# Patient Record
Sex: FEMALE | Race: WHITE | NOT HISPANIC OR LATINO | URBAN - METROPOLITAN AREA
[De-identification: names, ages, dates, MRNs, and addresses within clinical notes are randomized per-mention and may not be internally consistent; named-entity substitution may affect disease eponyms.]

---

## 2021-03-07 ENCOUNTER — INPATIENT (INPATIENT)
Facility: HOSPITAL | Age: 61
LOS: 2 days | Discharge: HOME | End: 2021-03-10
Attending: PSYCHIATRY & NEUROLOGY | Admitting: PSYCHIATRY & NEUROLOGY
Payer: MEDICARE

## 2021-03-07 VITALS
RESPIRATION RATE: 18 BRPM | DIASTOLIC BLOOD PRESSURE: 97 MMHG | HEART RATE: 67 BPM | SYSTOLIC BLOOD PRESSURE: 224 MMHG | OXYGEN SATURATION: 97 %

## 2021-03-07 LAB
ALBUMIN SERPL ELPH-MCNC: 4 G/DL — SIGNIFICANT CHANGE UP (ref 3.5–5.2)
ALP SERPL-CCNC: 86 U/L — SIGNIFICANT CHANGE UP (ref 30–115)
ALT FLD-CCNC: 13 U/L — SIGNIFICANT CHANGE UP (ref 0–41)
ANION GAP SERPL CALC-SCNC: 12 MMOL/L — SIGNIFICANT CHANGE UP (ref 7–14)
APTT BLD: 36.9 SEC — SIGNIFICANT CHANGE UP (ref 27–39.2)
AST SERPL-CCNC: 19 U/L — SIGNIFICANT CHANGE UP (ref 0–41)
BASE EXCESS BLDV CALC-SCNC: 3.6 MMOL/L — HIGH (ref -2–2)
BASOPHILS # BLD AUTO: 0.06 K/UL — SIGNIFICANT CHANGE UP (ref 0–0.2)
BASOPHILS NFR BLD AUTO: 0.7 % — SIGNIFICANT CHANGE UP (ref 0–1)
BILIRUB SERPL-MCNC: <0.2 MG/DL — SIGNIFICANT CHANGE UP (ref 0.2–1.2)
BUN SERPL-MCNC: 12 MG/DL — SIGNIFICANT CHANGE UP (ref 10–20)
CA-I SERPL-SCNC: 1.19 MMOL/L — SIGNIFICANT CHANGE UP (ref 1.12–1.3)
CALCIUM SERPL-MCNC: 9.5 MG/DL — SIGNIFICANT CHANGE UP (ref 8.5–10.1)
CHLORIDE SERPL-SCNC: 101 MMOL/L — SIGNIFICANT CHANGE UP (ref 98–110)
CHOLEST SERPL-MCNC: 317 MG/DL — HIGH
CO2 SERPL-SCNC: 24 MMOL/L — SIGNIFICANT CHANGE UP (ref 17–32)
CREAT SERPL-MCNC: 0.7 MG/DL — SIGNIFICANT CHANGE UP (ref 0.7–1.5)
EOSINOPHIL # BLD AUTO: 0.11 K/UL — SIGNIFICANT CHANGE UP (ref 0–0.7)
EOSINOPHIL NFR BLD AUTO: 1.3 % — SIGNIFICANT CHANGE UP (ref 0–8)
ETHANOL SERPL-MCNC: <10 MG/DL — SIGNIFICANT CHANGE UP
GAS PNL BLDV: 140 MMOL/L — SIGNIFICANT CHANGE UP (ref 136–145)
GAS PNL BLDV: SIGNIFICANT CHANGE UP
GLUCOSE SERPL-MCNC: 165 MG/DL — HIGH (ref 70–99)
HCO3 BLDV-SCNC: 30 MMOL/L — HIGH (ref 22–29)
HCT VFR BLD CALC: 42 % — SIGNIFICANT CHANGE UP (ref 37–47)
HCT VFR BLDA CALC: 50.1 % — HIGH (ref 34–44)
HDLC SERPL-MCNC: 29 MG/DL — LOW
HGB BLD CALC-MCNC: 16.3 G/DL — SIGNIFICANT CHANGE UP (ref 14–18)
HGB BLD-MCNC: 14.8 G/DL — SIGNIFICANT CHANGE UP (ref 12–16)
IMM GRANULOCYTES NFR BLD AUTO: 0.5 % — HIGH (ref 0.1–0.3)
INR BLD: 0.97 RATIO — SIGNIFICANT CHANGE UP (ref 0.65–1.3)
LACTATE BLDV-MCNC: 2 MMOL/L — HIGH (ref 0.5–1.6)
LIPID PNL WITH DIRECT LDL SERPL: 175 MG/DL — HIGH
LYMPHOCYTES # BLD AUTO: 3.87 K/UL — HIGH (ref 1.2–3.4)
LYMPHOCYTES # BLD AUTO: 46.3 % — SIGNIFICANT CHANGE UP (ref 20.5–51.1)
MCHC RBC-ENTMCNC: 31.5 PG — HIGH (ref 27–31)
MCHC RBC-ENTMCNC: 35.2 G/DL — SIGNIFICANT CHANGE UP (ref 32–37)
MCV RBC AUTO: 89.4 FL — SIGNIFICANT CHANGE UP (ref 81–99)
MONOCYTES # BLD AUTO: 0.58 K/UL — SIGNIFICANT CHANGE UP (ref 0.1–0.6)
MONOCYTES NFR BLD AUTO: 6.9 % — SIGNIFICANT CHANGE UP (ref 1.7–9.3)
NEUTROPHILS # BLD AUTO: 3.7 K/UL — SIGNIFICANT CHANGE UP (ref 1.4–6.5)
NEUTROPHILS NFR BLD AUTO: 44.3 % — SIGNIFICANT CHANGE UP (ref 42.2–75.2)
NON HDL CHOLESTEROL: 288 MG/DL — HIGH
NRBC # BLD: 0 /100 WBCS — SIGNIFICANT CHANGE UP (ref 0–0)
PCO2 BLDV: 50 MMHG — SIGNIFICANT CHANGE UP (ref 41–51)
PH BLDV: 7.38 — SIGNIFICANT CHANGE UP (ref 7.26–7.43)
PLATELET # BLD AUTO: 280 K/UL — SIGNIFICANT CHANGE UP (ref 130–400)
PO2 BLDV: 35 MMHG — SIGNIFICANT CHANGE UP (ref 20–40)
POTASSIUM BLDV-SCNC: 5.2 MMOL/L — SIGNIFICANT CHANGE UP (ref 3.3–5.6)
POTASSIUM SERPL-MCNC: 5.7 MMOL/L — HIGH (ref 3.5–5)
POTASSIUM SERPL-SCNC: 5.7 MMOL/L — HIGH (ref 3.5–5)
PROT SERPL-MCNC: 7.4 G/DL — SIGNIFICANT CHANGE UP (ref 6–8)
PROTHROM AB SERPL-ACNC: 11.2 SEC — SIGNIFICANT CHANGE UP (ref 9.95–12.87)
RAPID RVP RESULT: SIGNIFICANT CHANGE UP
RBC # BLD: 4.7 M/UL — SIGNIFICANT CHANGE UP (ref 4.2–5.4)
RBC # FLD: 12.7 % — SIGNIFICANT CHANGE UP (ref 11.5–14.5)
SAO2 % BLDV: 70 % — SIGNIFICANT CHANGE UP
SARS-COV-2 RNA SPEC QL NAA+PROBE: SIGNIFICANT CHANGE UP
SODIUM SERPL-SCNC: 137 MMOL/L — SIGNIFICANT CHANGE UP (ref 135–146)
TRIGL SERPL-MCNC: 664 MG/DL — HIGH
TROPONIN T SERPL-MCNC: <0.01 NG/ML — SIGNIFICANT CHANGE UP
WBC # BLD: 8.36 K/UL — SIGNIFICANT CHANGE UP (ref 4.8–10.8)
WBC # FLD AUTO: 8.36 K/UL — SIGNIFICANT CHANGE UP (ref 4.8–10.8)

## 2021-03-07 PROCEDURE — 70498 CT ANGIOGRAPHY NECK: CPT | Mod: 26

## 2021-03-07 PROCEDURE — 93010 ELECTROCARDIOGRAM REPORT: CPT

## 2021-03-07 PROCEDURE — 70496 CT ANGIOGRAPHY HEAD: CPT | Mod: 26

## 2021-03-07 PROCEDURE — 0042T: CPT

## 2021-03-07 PROCEDURE — 70450 CT HEAD/BRAIN W/O DYE: CPT | Mod: 26,59

## 2021-03-07 PROCEDURE — 71045 X-RAY EXAM CHEST 1 VIEW: CPT | Mod: 26

## 2021-03-07 PROCEDURE — G0425: CPT | Mod: 95

## 2021-03-07 PROCEDURE — 99291 CRITICAL CARE FIRST HOUR: CPT | Mod: CS,GC

## 2021-03-07 RX ORDER — DEXTROSE 50 % IN WATER 50 %
25 SYRINGE (ML) INTRAVENOUS ONCE
Refills: 0 | Status: DISCONTINUED | OUTPATIENT
Start: 2021-03-07 | End: 2021-03-10

## 2021-03-07 RX ORDER — INSULIN LISPRO 100/ML
VIAL (ML) SUBCUTANEOUS
Refills: 0 | Status: DISCONTINUED | OUTPATIENT
Start: 2021-03-07 | End: 2021-03-10

## 2021-03-07 RX ORDER — ALTEPLASE 100 MG
60.7 KIT INTRAVENOUS ONCE
Refills: 0 | Status: COMPLETED | OUTPATIENT
Start: 2021-03-07 | End: 2021-03-07

## 2021-03-07 RX ORDER — DEXTROSE 50 % IN WATER 50 %
15 SYRINGE (ML) INTRAVENOUS ONCE
Refills: 0 | Status: DISCONTINUED | OUTPATIENT
Start: 2021-03-07 | End: 2021-03-10

## 2021-03-07 RX ORDER — ATORVASTATIN CALCIUM 80 MG/1
80 TABLET, FILM COATED ORAL AT BEDTIME
Refills: 0 | Status: DISCONTINUED | OUTPATIENT
Start: 2021-03-07 | End: 2021-03-10

## 2021-03-07 RX ORDER — GLUCAGON INJECTION, SOLUTION 0.5 MG/.1ML
1 INJECTION, SOLUTION SUBCUTANEOUS ONCE
Refills: 0 | Status: DISCONTINUED | OUTPATIENT
Start: 2021-03-07 | End: 2021-03-10

## 2021-03-07 RX ORDER — SODIUM CHLORIDE 9 MG/ML
1000 INJECTION, SOLUTION INTRAVENOUS
Refills: 0 | Status: DISCONTINUED | OUTPATIENT
Start: 2021-03-07 | End: 2021-03-10

## 2021-03-07 RX ORDER — ALTEPLASE 100 MG
6.7 KIT INTRAVENOUS ONCE
Refills: 0 | Status: COMPLETED | OUTPATIENT
Start: 2021-03-07 | End: 2021-03-07

## 2021-03-07 RX ORDER — NICARDIPINE HYDROCHLORIDE 30 MG/1
5 CAPSULE, EXTENDED RELEASE ORAL
Qty: 40 | Refills: 0 | Status: DISCONTINUED | OUTPATIENT
Start: 2021-03-07 | End: 2021-03-10

## 2021-03-07 RX ORDER — DEXTROSE 50 % IN WATER 50 %
12.5 SYRINGE (ML) INTRAVENOUS ONCE
Refills: 0 | Status: DISCONTINUED | OUTPATIENT
Start: 2021-03-07 | End: 2021-03-10

## 2021-03-07 RX ORDER — LABETALOL HCL 100 MG
10 TABLET ORAL
Refills: 0 | Status: DISCONTINUED | OUTPATIENT
Start: 2021-03-07 | End: 2021-03-10

## 2021-03-07 RX ADMIN — ALTEPLASE 402 MILLIGRAM(S): KIT at 19:44

## 2021-03-07 RX ADMIN — NICARDIPINE HYDROCHLORIDE 25 MG/HR: 30 CAPSULE, EXTENDED RELEASE ORAL at 21:01

## 2021-03-07 RX ADMIN — ALTEPLASE 60.7 MILLIGRAM(S): KIT at 19:45

## 2021-03-07 RX ADMIN — Medication 10 MILLIGRAM(S): at 19:41

## 2021-03-07 NOTE — H&P ADULT - ATTENDING COMMENTS
patient seen and examined, agree with above, ?? stroke sp tpa, active smoker, HTN, Neuro check q 1h, Neuro f/up, MRI, 24 h in MICU

## 2021-03-07 NOTE — H&P ADULT - ASSESSMENT
60 year old female patient presented for syncope and left sided weakness.     Known to be manic depressive, type II diabetes not on any treatment, active smoker 46PY, right handed.    # Possibly Lacunar stroke given the history of uncontrolled Diabetes and BP  - TPA given     > TPA bolus dose: 6.7 ml (7.44pm)    > TPA infusion dose: 60.4 ml     > initiated at 7:45pm  - Neuro checks q15 min x2 hours then q 30 min x6 hrs and then q 1hour x 24 hrs  - No anticoagulation or antiplatelets for now  - Mechanical DVT prophylaxis  - Keep sbp <185/105 and >110/60  - On nicardipine drip  - Repeat HCT in 24 hours or sooner if clinical deterioration > Order not placed, observing at the moment  - Pending Mri brain n/c  - Pending Echocardiogram  - Passed dysphagia screen   - On Lipitor 80mg q 24    # Manic depressive  - Receives Prolixin injection once q2 weeks  - Last dose 3/5/21 according to patient     # DM II  - On insulin scale  - Pending HbA1c    # Diet > DASH/TLC consistent carbs, passed dysphagia screen  # DVT > SCDs 60 year old female patient presented for syncope and left sided weakness.     Known to be manic depressive, type II diabetes not on any treatment, active smoker 46PY, right handed.    # Possibly Lacunar stroke given the history of uncontrolled Diabetes and BP  - TPA given     > TPA bolus dose: 6.7 ml (7.44pm)    > TPA infusion dose: 60.4 ml     > initiated at 7:45pm  - Neuro checks q15 min x2 hours then q 30 min x6 hrs and then q 1hour x 24 hrs  - No anticoagulation or antiplatelets for now  - Mechanical DVT prophylaxis  - Keep sbp <185/105 and >110/60  - On nicardipine drip  - Repeat HCT in 24 hours or sooner if clinical deterioration > Order not placed, observing at the moment  - Pending Mri brain n/c  - Pending Echocardiogram  - Passed dysphagia screen     # Untreated dyslipidemia  - Lipid Profile (03.07.21 @ 19:40)    Cholesterol, Serum: 317 mg/dL    Triglycerides, Serum: 664 mg/dL    HDL Cholesterol, Serum: 29 mg/dL    LDL cholesterol 175 mg/dl  - Started Lipitor 80mg q 24    # Untreated DM II  - On insulin scale  - Pending HbA1c    # Manic depressive  - Receives Prolixin injection once q2 weeks  - Last dose 3/5/21 according to patient     # Diet > DASH/TLC consistent carbs, passed dysphagia screen  # DVT > SCDs 60 year old female patient presented for syncope and left sided weakness.     Known to be manic depressive, type II diabetes not on any treatment, active smoker 46PY, right handed.    # stroke  - TPA given     > TPA bolus dose: 6.7 ml (7.44pm)    > TPA infusion dose: 60.4 ml     > initiated at 7:45pm  - Neuro checks q15 min x2 hours then q 30 min x6 hrs and then q 1hour x 24 hrs  - No anticoagulation or antiplatelets for now  - Mechanical DVT prophylaxis  - Keep sbp <185/105 and >110/60  - On nicardipine drip  - Repeat HCT in 24 hours or sooner if clinical deterioration > Order not placed, observing at the moment  - Pending Mri brain n/c  - Pending Echocardiogram  - Passed dysphagia screen     # Untreated dyslipidemia  - Lipid Profile (03.07.21 @ 19:40)    Cholesterol, Serum: 317 mg/dL    Triglycerides, Serum: 664 mg/dL    HDL Cholesterol, Serum: 29 mg/dL    LDL cholesterol 175 mg/dl  - Started Lipitor 80mg q 24    # Untreated DM II  - On insulin scale  - Pending HbA1c    # Manic depressive  - Receives Prolixin injection once q2 weeks  - Last dose 3/5/21 according to patient     # Diet > DASH/TLC consistent carbs, passed dysphagia screen  # DVT > SCDs

## 2021-03-07 NOTE — H&P ADULT - HISTORY OF PRESENT ILLNESS
60 year old female patient presented for syncope and left sided weakness.     Known to be manic depressive, type II diabetes not on any treatment, active smoker 46PY, right handed.    Current history goes back to 6:00pm of the day of presentation when the patient nazia from a seated position and had an unwitnessed syncope.   Paitnet afterwards noted difficulty walking and falling towards her left side. Noted weakness in her left lower extremity, called EMS and presented for assessment.     Last well known 1700.  BP on arrival 224/97 . CTH was negative for acute intracranial findings. Patient was evaluated by neuro attending on call Dr Taylor via telestroke. Patient was a tpa candidate. Risks and benefits of tpa was explained to patient in detail including the 6% chance of bleeding and patient agreed to IV TPA. CTA/P was negative for LVO and perfusion deficits.

## 2021-03-07 NOTE — ED PROVIDER NOTE - CLINICAL SUMMARY MEDICAL DECISION MAKING FREE TEXT BOX
Pt presented to ED with stroke sx's. Labs, imaging obtained and reviewed. PT with stroke symptoms within time window for tpa. R/B/A discussed. PT given tpa, approved for ICU for further management.

## 2021-03-07 NOTE — ED ADULT NURSE NOTE - OBJECTIVE STATEMENT
Patient is a 60 year old female BIBA as a prenotification for a CVA. As per patient she was at baseline at 5PM, at 6oclock she tried getting up to make dinner and fell down due to left sided weakness. No other complaints at this time.

## 2021-03-07 NOTE — ED PROVIDER NOTE - OBJECTIVE STATEMENT
60Y F with PMH of DM, + smoker presents with CC of left arm weakness, left sided numbness, +facial droop for 1-2 hour prior to arrival?, patient last well known between 5-6PM. Patient with EMS notifications, Stroke code, called PTA. Denies fevers, chest pain, SOB, abdominal pain, N/V. NIHSS of 6 on arrival.

## 2021-03-07 NOTE — ED ADULT NURSE NOTE - NSIMPLEMENTINTERV_GEN_ALL_ED
Implemented All Fall with Harm Risk Interventions:  [place] to call system. Call bell, personal items and telephone within reach. Instruct patient to call for assistance. Room bathroom lighting operational. Non-slip footwear when patient is off stretcher. Physically safe environment: no spills, clutter or unnecessary equipment. Stretcher in lowest position, wheels locked, appropriate side rails in place. Provide visual cue, wrist band, yellow gown, etc. Monitor gait and stability. Monitor for mental status changes and reorient to person, place, and time. Review medications for side effects contributing to fall risk. Reinforce activity limits and safety measures with patient and family. Provide visual clues: red socks.

## 2021-03-07 NOTE — CONSULT NOTE ADULT - SUBJECTIVE AND OBJECTIVE BOX
Chief Complaint: Left sided weakness and numbness        HPI:  Pt is a 61 y/o Right handed female with hx of DM, + smoker, Ayan score of 0 at baseline p.w left sided weakness and sensory loss that started an hour prior to arrival as per patient. Patient states that she was baseline and walked normally at 5pm and then at 6pm she tried to get out of the chair and then fell due to weakness in her left left leg. Last well known 1700. Patient denies speech visual deficits, n/v dizziness headache, LOC or head trauma. BP on arrival 224/97           Relevant PMH:  [] Prior ischemic stroke/TIA  [] Afib  []CAD  []HTN  []DLD  [x]DM []PVD []Obesity [] Sedentary lifestyle []CHF  []ASHWIN  []Cancer Hx         Social History: [x] Smoking []  Drug Use: []   Alcohol Use:   [] Other:        Possible Location of Stroke:  Unknown at this time, will have a better understanding post stroke workup.      Possible Cause of Stroke:  Unknown at this time, will have a better understanding post stroke workup.      Relevant Cerebral Imaging:  < from: CT Brain Stroke Protocol (03.07.21 @ 19:31) >  FINDINGS:    The ventricles, basal cisterns and sulcal pattern are within normal limits for patient's stated age.    There is no acute mass effect, midline shift or intracranial hemorrhage.    There is no large territorial infarct.    Bilateral subcortical and periventricular white matter hypodensities, consistent with chronic microvascular ischemic changes.    No evidence of depressed skull fracture.    The visualized paranasal sinuses are well aerated.    The bilateral mastoid complexes, globes and orbits are grossly within normal limits.    IMPRESSION:  1.  No CT evidence of acute intracranial pathology.  2.  Moderate chronic microvascular ischemic changes.    Dr. Isa Rodriguez discussed preliminary findings with CRESENCIO DYE NP on 3/7/2021 7:44 PM with readback.  I have reviewed the above preliminary report following comment-there is a poorly defined area of low density involving the left lateral basal ganglia/putamen which may represent ischemic infarct. Correlate with clinical findings and if clinically indicated an MRI study is suggested for further evaluation of this area. A call back request was submitted            ISA RODRIGUEZ M.D., RESIDENT RADIOLOGIST  This document has been electronically signed.  NATE BERRIOS MD; Attending Radiologist  This document has been electronically signed. Mar  7 2021  8:13PM    < end of copied text >        Relevant Cervicocerebral Imaging:  CT Angio Neck w/ IV Cont:   ******PRELIMINARY REPORT******    ******PRELIMINARY REPORT******          EXAM:  CT ANGIO NECK (W)AW IC        EXAM:  CT ANGIO BRAIN (W)AW IC        EXAM:  CT PERFUSION W MAPS IC            PROCEDURE DATE:  03/07/2021      ******PRELIMINARY REPORT******    ******PRELIMINARY REPORT******          INTERPRETATION:  Clinical History / Reason for exam: Stroke code.    Technique: CT perfusion of the brain was performed along with CTA of the head and neck after the intravenous administration of 100 cc Isovue-370 contrast. Sagittal, coronal and axial reformatted images were obtained as well as 3-D volume rendered images.    No comparison studies are available.    Findings:    CTA neck:  The aortic arch, origin of the great vessels and subclavian arteries are unremarkable.  There is mild stenosis at the origin of the right internal carotid artery with poststenotic dilatation. There is mild stenosis of the proximal internal carotid artery.  Otherwise the bilateral common carotid arteries and internal carotid arteries are unremarkable without significant stenosis seen.  The vertebral arteries are codominant.  No significant vertebral artery stenosis is noted.    CTA head:  The distal internal carotid arteries, middle cerebral arteries and anterior cerebral arteries are unremarkable without significant stenosis.  The basilar artery, superior cerebellar arteries and posterior cerebral arteries are unremarkable without significant stenosis.    No aneurysm or vascular malformation is identified.    Degenerative changes of the spine.    CT perfusion:  Using Tmax >6 seconds there is decreased perfusion of the bilateral temporal lobes, likely artifactual in nature given location and distribution. Otherwise no significant evidence of fixedor mismatched focal perfusion deficit or surrounding ischemic penumbra to suggest acute cerebral ischemia or infarct.      IMPRESSION:  1.  Normal CT angiogram of the head and neck.  2.  Using Tmax >6 seconds there is decreased perfusion of the bilateral temporal lobes, likely artifactual in nature given location and distribution.  3.  Otherwise no evidence of acute ischemia.    Dr. Isa Rodriguez discussed preliminary findings with CRESENCIO DYE NP on 3/7/2021 8:25 PM with readback.      Relevant blood tests:  Lipid Profile (03.07.21 @ 19:40)   Cholesterol, Serum: 317 mg/dL   Triglycerides, Serum: 664 mg/dL   HDL Cholesterol, Serum: 29 mg/dL   Non HDL Cholesterol: 288: Patient’s Atherosclerotic Cardiovascular Disease (ASCVD) Risk   LDL Cholesterol Calculated: 175 mg/dL       Relevant cardiac rhythm monitoring: Pending    Relevant Cardiac Structure:(TTE/KEM +/-):[]No intracardiac thrombus/[] no vegetation/[]no akynesia/EF: Pending      Home Medications:    MEDICATIONS  (STANDING):  alteplase    Bolus 6.7 milliGRAM(s) IV Bolus Once  alteplase    IVPB 60.7 milliGRAM(s) IV Intermittent Once  labetalol Injectable 10 milliGRAM(s) IV Push every 10 minutes  niCARdipine Infusion 5 mG/Hr (25 mL/Hr) IV Continuous <Continuous>      PT/OT/Speech/Rehab/S&Swr: pending    Exam:  Patient a/O X 4 , speech is fluent.  CN: Intact except for left facial droop  Power: RUE 5/5   RLE 5/5             LUE 4-/5  LLE 4-/5 (Left arm and leg drift)  FTN: Left dysmetria  HKS: No limb ataxia  Sensory: Decreased sensation to pp on the left side  No neglect        Vital Signs Last 24 Hrs  T(C): --  T(F): --  HR: 62 (07 Mar 2021 19:46) (58 - 79)  BP: 189/90 (07 Mar 2021 19:46) (174/102 - 224/97)  BP(mean): --  RR: 18 (07 Mar 2021 19:33) (18 - 18)  SpO2: 96% (07 Mar 2021 19:33) (96% - 97%)        NIHSS  LOC:       1a:  0   1b(Questions):   0        1c(Instructions):  0           Best Gaze:0  Visual: 0  Motor:                 RUE: 0    RLE:  0   LUE: 1    LLE:  1   FACE:   1  Limb Ataxia: 1  Sensory:    1   Language:   0    Dysarthria:  0        Extinction and Inattention: 0    NIHSS on admission: 5                   m-RS: (baseline mrankin score 0)  0 No symptoms at all  1 No significant disability despite symptoms; able to carry out all usual duties and activities without assistance  2 Slight disability; unable to carry out all previous activities, but able to look after own affairs  3 Moderate disability; requiring some help, but able to walk without assistance  4 Moderately severe disability; unable to walk without assistance and unable to attend to own bodily needs without assistance  5 Severe disability; bedridden, incontinent and requiring constant nursing care and attention  6 Dead

## 2021-03-07 NOTE — CONSULT NOTE ADULT - ATTENDING COMMENTS
History, events, data and scans reviewed, patient examined at bedside on 03/08/2021. I agree and approved plan of care as outlined above and a progress note dated 03/08/2021.

## 2021-03-07 NOTE — CONSULT NOTE ADULT - CONSULT REASON
******Stroke code activated at : 7.09 pm (prenotification)              Patient arrived to ED : 7.17pm              Last known well time : 5pm

## 2021-03-07 NOTE — STROKE CODE NOTE - ASSESSMENT/PLAN
Left sided weakness and numbness  NIHSS 5  Mrankin Score OF 0 at baseline Left sided weakness and numbness  NIHSS 5  Mrankin Score OF 0 at baseline.    Patient examined over telestroke.    She recalls walking normally at 5 p.m.  When she got up to walk at 6 p.m. she fell and called friend.    Risk/benefits of TPA explained to patient and gave patient opportunity to ask questions.    TPA administered at 7:44 p.m.    Disposition ICU.

## 2021-03-07 NOTE — H&P ADULT - NSHPPHYSICALEXAM_GEN_ALL_CORE
General: A/ox 3, No acute Distress  Neck: Supple, NO JVD  Cardiac: S1 S2 heard regular, No audible murmurs  Pulmonary: Good bilateral breath sounds, Breathing unlabored, No Rhonchi/Rales/Wheezing  Abdomen: Soft, Non -tender, +BS   Extremities: No Rashes, No edema  Neuro: A/o x 3, No focal deficits, tremor noted in right hand. Chronic as per patient. No weakness at time of H&P. No slurred speech either.   Psch: normal mood , normal affect    T(C): 36.6 (03-07-21 @ 21:45), Max: 36.9 (03-07-21 @ 20:00)  HR: 100 (03-07-21 @ 21:45) (58 - 100)  BP: 178/80 (03-07-21 @ 21:45) (131/70 - 224/97)  RR: 18 (03-07-21 @ 21:45) (18 - 18)  SpO2: 97% (03-07-21 @ 21:45) (96% - 99%)

## 2021-03-07 NOTE — ED PROVIDER NOTE - NS ED ROS FT
Review of Systems:  CONSTITUTIONAL - No fever  SKIN - No rash  HEMATOLOGIC - No abnormal bleeding or bruising  RESPIRATORY - No shortness of breath, No cough  CARDIAC -No chest pain, No palpitations  GI - No abdominal pain, No nausea, No vomiting, No diarrhea  MUSCULOSKELETAL - No joint paint, No swelling, No back pain  All other systems negative, unless specified in HPI

## 2021-03-07 NOTE — CONSULT NOTE ADULT - ASSESSMENT
Impression:  59 y/o Right handed female with hx of DM, + smoker, Ayan score of 0 at baseline p.w left sided weakness and sensory loss that started an hour prior to arrival as per patient. Patient states that she was baseline and walked normally at 5pm and then at 6pm she tried to get out of the chair and then fell due to weakness in her left left leg. Last well known 1700.  BP on arrival 224/97 . CTH was negative for acute intracranial findings. Patient was evaluated by neuro attending on call Dr Taylor via telestroke. Patient was a tpa candidate. Risks and benefits of tpa was explained to patient in detail including the 6% chance of bleeding and patient agreed to IV TPA. CTA/P was negative for LVO and perfusion deficits.    #Likely a Lacunar stroke given the history of uncontrolled Diabetes and BP    Patient weight : 74.5kg  TPA bolus dose: 6.7 ml (7.44pm)  TPA infusion dose: 60.4 ml (initiated at 7.45pm)        Suggestion:  Administer TPA 10% bolus followed by 90%infusion over 1 hour (Administered in ed)  Neuro checks q15 min x2 hours then q 30 min x6 hrs and then q 1hour x 24 hrs  No anticoagulation or antiplatelets for now  Mechanical DVT prophylaxis  Keep sbp <185/105 and >110/60  Repeat HCT in 24 hours or sooner if clinical deterioration  Mri brain n/c  Echocardiogram  Dysphagia screen(no po meds until patient clears dysphagia screen)  Start Lipitor 80mg q 24  Pt/Rehab eval     Disposition: ICU /CCU   Impression:  61 y/o Right handed female with hx of DM, + smoker, Ayan score of 0 at baseline p.w left sided weakness and sensory loss that started an hour prior to arrival as per patient. Patient states that she was baseline and walked normally at 5pm and then at 6pm she tried to get out of the chair and then fell due to weakness in her left left leg. Last well known 1700.  BP on arrival 224/97 . CTH was negative for acute intracranial findings. Patient was evaluated by neuro attending on call Dr Taylor via telestroke. Patient was a tpa candidate. Risks and benefits of tpa was explained to patient in detail including the 6% chance of bleeding and patient agreed to IV TPA. CTA/P was negative for LVO and perfusion deficits.    #Likely a Lacunar stroke given the history of uncontrolled Diabetes and BP    Patient weight : 74.5kg  TPA bolus dose: 6.7 ml (7.44pm)  TPA infusion dose: 60.4 ml (initiated at 7.45pm)        Suggestion:  Administer TPA 10% bolus followed by 90%infusion over 1 hour (Administered in ed)  Neuro checks q15 min x2 hours then q 30 min x6 hrs and then q 1hour x 24 hrs  No anticoagulation or antiplatelets for now  Mechanical DVT prophylaxis  Keep sbp <185/105 and >110/60  Repeat HCT in 24 hours or sooner if clinical deterioration  Mri brain n/c  Echocardiogram  Dysphagia screen(no po meds until patient clears dysphagia screen)  Start Lipitor 80mg q 24  Pt/Rehab eval   Smoking cessation education and nicotine patch.    Disposition: ICU /CCU

## 2021-03-07 NOTE — ED PROVIDER NOTE - PHYSICAL EXAMINATION
CONSTITUTIONAL: well developed, well nourished, in no acute distress, speaking in full sentences, nontoxic appearing  SKIN: warm, dry, no rash  HEAD: normocephalic, atraumatic  EYES: PERRL at 3 mm, EOMI, no conjunctival erythema, no nystagmus  ENT: patent airway, moist mucous membranes, no tongue deviation  NECK: supple, no masses, full flexion/extension without pain  CV:  regular rate, regular rhythm, 2+ radial pulses bilaterally  RESP: no wheezes, no rales, no rhonchi, normal work of breathing  ABD: soft, nontender, nondistended, no rebound, no guarding  MSK: normal ROM, no cyanosis, no edema  NEURO: alert, oriented, CN 2-12 grossly intact, sensation intact to light touch symmetrically in LLE, diminished on left side of the face and LUE, 5/5 motor strength in all extremities but diminished in LUE, dysmetria, no pronator drift, + facial asymmetry left sided droop  PSYCH: cooperative, appropriate

## 2021-03-07 NOTE — H&P ADULT - NSHPLABSRESULTS_GEN_ALL_CORE
LABS:                        14.8   8.36  )-----------( 280      ( 07 Mar 2021 19:40 )             42.0     03-07    137  |  101  |  12  ----------------------------<  165<H>  5.7<H>   |  24  |  0.7    Ca    9.5      07 Mar 2021 19:40    TPro  7.4  /  Alb  4.0  /  TBili  <0.2  /  DBili  x   /  AST  19  /  ALT  13  /  AlkPhos  86  03-07    PT/INR - ( 07 Mar 2021 19:40 )   PT: 11.20 sec;   INR: 0.97 ratio         PTT - ( 07 Mar 2021 19:40 )  PTT:36.9 sec  Aspartate Aminotransferase (AST/SGOT): 19 U/L (03-07-21 @ 19:40)  Alanine Aminotransferase (ALT/SGPT): 13 U/L (03-07-21 @ 19:40)

## 2021-03-07 NOTE — ED PROVIDER NOTE - PROGRESS NOTE DETAILS
PODLOG: FS normal upon arrival Neuro bedside. Taken to CT. Pt tpa candidate. Dr. Taylor explained R/B/A, pt wanted treatment. Dose confirmed with neuro and nursing team. Pt slightly hypertensive labetalol given and cardene started with BP dropping below tpa cutoff. Tpa administered. Pt taken back to CT for CTA/CTP.

## 2021-03-07 NOTE — ED PROVIDER NOTE - ATTENDING CONTRIBUTION TO CARE
I personally evaluated the patient. I reviewed the Resident’s or Physician Assistant’s note (as assigned above), and agree with the findings and plan except as documented in my note.    Pt is a 59 y/o female with hx of DM, + tobacco use, who presents for left arm weakness and left weakness that started 1 hour PTA. Last well known 1700. Pt with EMS notification, stroke code called PTA. No headache. no trauma.    Constitutional: Well developed, well nourished. NAD.  Head: Normocephalic, atraumatic.  Eyes: PERRL. EOMI.  ENT: No nasal discharge. Mucous membranes moist.  Neck: Supple. Painless ROM.  Cardiovascular: Normal S1, S2. Regular rate and rhythm. No murmurs, rubs, or gallops.  Pulmonary: Normal respiratory rate and effort. Lungs clear to auscultation bilaterally. No wheezing, rales, or rhonchi.  Abdominal: Soft. Nondistended. Nontender. No rebound, guarding, rigidity.  Extremities. Pelvis stable. No lower extremity edema, symmetric calves.  Skin: No rashes, cyanosis.  Neuro: AAOx3. CN II-XII grossly intact, except mild left facial weakness. No slurred speech. Strength 5/5 in right upper and lower extremities, mild drift in MALICK/LLE. Sensation intact in all extremities. FNF testing normal. No pronator drift. Negative Rhombergs test.   Psych: Normal mood. Normal affect.

## 2021-03-08 DIAGNOSIS — Z90.710 ACQUIRED ABSENCE OF BOTH CERVIX AND UTERUS: Chronic | ICD-10-CM

## 2021-03-08 LAB
A1C WITH ESTIMATED AVERAGE GLUCOSE RESULT: 6.4 % — HIGH (ref 4–5.6)
ALBUMIN SERPL ELPH-MCNC: 4.1 G/DL — SIGNIFICANT CHANGE UP (ref 3.5–5.2)
ALP SERPL-CCNC: 86 U/L — SIGNIFICANT CHANGE UP (ref 30–115)
ALT FLD-CCNC: 12 U/L — SIGNIFICANT CHANGE UP (ref 0–41)
ANION GAP SERPL CALC-SCNC: 15 MMOL/L — HIGH (ref 7–14)
AST SERPL-CCNC: 12 U/L — SIGNIFICANT CHANGE UP (ref 0–41)
BASOPHILS # BLD AUTO: 0.06 K/UL — SIGNIFICANT CHANGE UP (ref 0–0.2)
BASOPHILS NFR BLD AUTO: 0.6 % — SIGNIFICANT CHANGE UP (ref 0–1)
BILIRUB SERPL-MCNC: <0.2 MG/DL — SIGNIFICANT CHANGE UP (ref 0.2–1.2)
BUN SERPL-MCNC: 8 MG/DL — LOW (ref 10–20)
CALCIUM SERPL-MCNC: 9.2 MG/DL — SIGNIFICANT CHANGE UP (ref 8.5–10.1)
CHLORIDE SERPL-SCNC: 101 MMOL/L — SIGNIFICANT CHANGE UP (ref 98–110)
CO2 SERPL-SCNC: 24 MMOL/L — SIGNIFICANT CHANGE UP (ref 17–32)
CREAT SERPL-MCNC: 0.6 MG/DL — LOW (ref 0.7–1.5)
EOSINOPHIL # BLD AUTO: 0.11 K/UL — SIGNIFICANT CHANGE UP (ref 0–0.7)
EOSINOPHIL NFR BLD AUTO: 1.1 % — SIGNIFICANT CHANGE UP (ref 0–8)
ESTIMATED AVERAGE GLUCOSE: 137 MG/DL — HIGH (ref 68–114)
GLUCOSE BLDC GLUCOMTR-MCNC: 148 MG/DL — HIGH (ref 70–99)
GLUCOSE BLDC GLUCOMTR-MCNC: 199 MG/DL — HIGH (ref 70–99)
GLUCOSE SERPL-MCNC: 99 MG/DL — SIGNIFICANT CHANGE UP (ref 70–99)
HCT VFR BLD CALC: 46 % — SIGNIFICANT CHANGE UP (ref 37–47)
HCV AB S/CO SERPL IA: 0.04 COI — SIGNIFICANT CHANGE UP
HCV AB SERPL-IMP: SIGNIFICANT CHANGE UP
HGB BLD-MCNC: 15.6 G/DL — SIGNIFICANT CHANGE UP (ref 12–16)
IMM GRANULOCYTES NFR BLD AUTO: 0.4 % — HIGH (ref 0.1–0.3)
LACTATE SERPL-SCNC: 1.8 MMOL/L — SIGNIFICANT CHANGE UP (ref 0.7–2)
LYMPHOCYTES # BLD AUTO: 2.96 K/UL — SIGNIFICANT CHANGE UP (ref 1.2–3.4)
LYMPHOCYTES # BLD AUTO: 29.5 % — SIGNIFICANT CHANGE UP (ref 20.5–51.1)
MAGNESIUM SERPL-MCNC: 2.2 MG/DL — SIGNIFICANT CHANGE UP (ref 1.8–2.4)
MCHC RBC-ENTMCNC: 31 PG — SIGNIFICANT CHANGE UP (ref 27–31)
MCHC RBC-ENTMCNC: 33.9 G/DL — SIGNIFICANT CHANGE UP (ref 32–37)
MCV RBC AUTO: 91.3 FL — SIGNIFICANT CHANGE UP (ref 81–99)
MONOCYTES # BLD AUTO: 0.67 K/UL — HIGH (ref 0.1–0.6)
MONOCYTES NFR BLD AUTO: 6.7 % — SIGNIFICANT CHANGE UP (ref 1.7–9.3)
NEUTROPHILS # BLD AUTO: 6.19 K/UL — SIGNIFICANT CHANGE UP (ref 1.4–6.5)
NEUTROPHILS NFR BLD AUTO: 61.7 % — SIGNIFICANT CHANGE UP (ref 42.2–75.2)
NRBC # BLD: 0 /100 WBCS — SIGNIFICANT CHANGE UP (ref 0–0)
PLATELET # BLD AUTO: 285 K/UL — SIGNIFICANT CHANGE UP (ref 130–400)
POTASSIUM SERPL-MCNC: 4.1 MMOL/L — SIGNIFICANT CHANGE UP (ref 3.5–5)
POTASSIUM SERPL-SCNC: 4.1 MMOL/L — SIGNIFICANT CHANGE UP (ref 3.5–5)
PROT SERPL-MCNC: 6.8 G/DL — SIGNIFICANT CHANGE UP (ref 6–8)
RBC # BLD: 5.04 M/UL — SIGNIFICANT CHANGE UP (ref 4.2–5.4)
RBC # FLD: 13 % — SIGNIFICANT CHANGE UP (ref 11.5–14.5)
SODIUM SERPL-SCNC: 140 MMOL/L — SIGNIFICANT CHANGE UP (ref 135–146)
WBC # BLD: 10.03 K/UL — SIGNIFICANT CHANGE UP (ref 4.8–10.8)
WBC # FLD AUTO: 10.03 K/UL — SIGNIFICANT CHANGE UP (ref 4.8–10.8)

## 2021-03-08 PROCEDURE — 99222 1ST HOSP IP/OBS MODERATE 55: CPT

## 2021-03-08 PROCEDURE — 93010 ELECTROCARDIOGRAM REPORT: CPT

## 2021-03-08 PROCEDURE — 99232 SBSQ HOSP IP/OBS MODERATE 35: CPT

## 2021-03-08 PROCEDURE — 70551 MRI BRAIN STEM W/O DYE: CPT | Mod: 26

## 2021-03-08 RX ORDER — INFLUENZA VIRUS VACCINE 15; 15; 15; 15 UG/.5ML; UG/.5ML; UG/.5ML; UG/.5ML
0.5 SUSPENSION INTRAMUSCULAR ONCE
Refills: 0 | Status: DISCONTINUED | OUTPATIENT
Start: 2021-03-08 | End: 2021-03-10

## 2021-03-08 RX ORDER — ASPIRIN/CALCIUM CARB/MAGNESIUM 324 MG
81 TABLET ORAL DAILY
Refills: 0 | Status: DISCONTINUED | OUTPATIENT
Start: 2021-03-08 | End: 2021-03-10

## 2021-03-08 RX ADMIN — ATORVASTATIN CALCIUM 80 MILLIGRAM(S): 80 TABLET, FILM COATED ORAL at 23:31

## 2021-03-08 RX ADMIN — Medication 2: at 19:29

## 2021-03-08 NOTE — PROGRESS NOTE ADULT - SUBJECTIVE AND OBJECTIVE BOX
[facility] day: 1    HPI:    60 year old female patient presented for syncope and left sided weakness. PMH manic depressive, type II diabetes not on any home medicaiton, active smoker 46PY, right handed. Current history goes back to 6:00pm of the day of presentation when the patient nazia from a seated position and had an unwitnessed syncope. Pt afterwards noted difficulty walking and falling towards her left side. Noted weakness in her left lower extremity, called EMS and presented to ED. Last well known 1700. BP on arrival 224/97 . CTH was negative for acute intracranial findings. Patient was evaluated by neuro attending on call Dr Taylor via telestroke. Patient was a tpa candidate. Risks and benefits of tpa was explained to patient in detail including the 6% chance of bleeding and patient agreed to IV TPA. CTA/P was negative for LVO and perfusion deficits.    Today, patient s/p TPA yesterday 19:44pm. Patient seen and examined while in ED. Pt aaox3. No acute distress. Pt mildly tachycardic during exam, EKG ordered. Awaiting head ct tonight.       PAST MEDICAL & SURGICAL HISTORY:    Diabetes mellitus  Active Smoker     Last 24 hour updates: Pt mildly tachycardic during exam, EKG ordered       Home Medications:  Prolixin 2.5 mg/mL injectable solution: 1 application injectable every 2 weeks.  Last dose taken on 3/5/21 (07 Mar 2021 22:04)    Current Medications:   atorvastatin 80 milliGRAM(s) Oral at bedtime  dextrose 40% Gel 15 Gram(s) Oral once  dextrose 5%. 1000 milliLiter(s) IV Continuous <Continuous>  dextrose 5%. 1000 milliLiter(s) IV Continuous <Continuous>  dextrose 50% Injectable 25 Gram(s) IV Push once  dextrose 50% Injectable 12.5 Gram(s) IV Push once  dextrose 50% Injectable 25 Gram(s) IV Push once  glucagon  Injectable 1 milliGRAM(s) IntraMuscular once  insulin lispro (ADMELOG) corrective regimen sliding scale SubCutaneous three times a day before meals  labetalol Injectable 10 milliGRAM(s) IV Push every 10 minutes  niCARdipine Infusion 5 mG/Hr IV Continuous <Continuous>      T(F): 98 (03-08-21 @ 11:00), Max: 98.5 (03-07-21 @ 20:30)  HR: 74 (03-08-21 @ 13:00) (58 - 106)  BP: 126/72 (03-08-21 @ 13:00) (114/56 - 224/97)  RR: 18 (03-08-21 @ 13:00) (17 - 18)  SpO2: 100% (03-08-21 @ 13:00) (96% - 100%)                        15.6   10.03 )-----------( 285      ( 08 Mar 2021 05:46 )             46.0     03-08    140  |  101  |  8<L>  ----------------------------<  99  4.1   |  24  |  0.6<L>    Ca    9.2      08 Mar 2021 05:46  Mg     2.2     03-08    TPro  6.8  /  Alb  4.1  /  TBili  <0.2  /  DBili  x   /  AST  12  /  ALT  12  /  AlkPhos  86  03-08    PT/INR - ( 07 Mar 2021 19:40 )   PT: 11.20 sec;   INR: 0.97 ratio         PTT - ( 07 Mar 2021 19:40 )  PTT:36.9 sec    LIVER FUNCTIONS - ( 08 Mar 2021 05:46 )  Alb: 4.1 g/dL / Pro: 6.8 g/dL / ALK PHOS: 86 U/L / ALT: 12 U/L / AST: 12 U/L / GGT: x           CARDIAC MARKERS ( 07 Mar 2021 19:40 )  x     / <0.01 ng/mL / x     / x     / x          NIHSS: Admission=5     Neuro Exam:    Neurological:  Mental Status: Alert and Oriented to person, place, and time, cooperative, pleasant. Short- and long-term memory, abstract thinking and calculation intact. Dysarthria present.   Cranial Nerves:  II, III, IV, VI: PERRLA, EOM intact. Garcia intact by confrontation. No cranial nerve palsy or nystagmus noted.  V: facial sensation intact; masseter/ temporal muscles intact, corneal reflex intact bilaterally  VII: Left sided flattening of nasolabial folds.   VIII: intact to finger rub in the right ear and left ear  IX and X: no hoarseness, gag intact, palate/ uvula rise symmetrically  XI: SCM/ trapezius strength intact bilateral  XII: Dysarthria, tongue weakness/fasiculation   Motor: Strength RUE 5/5, LUE 4/5, RLE 5/5, LLE 4/5.   Sensory: Sensation to light touch, pain, vibration, proprioception, and stereognosis intact to trunk and bilaterally to both upper and lower extremities. Normal two point discrimination.   Cerebellar Function: Pronator drift negative. Normal rapid alternating movements and point to point test.  Reflexes: No clonus    Last CTH:  < from: CT Brain Stroke Protocol (03.07.21 @ 19:31) >  IMPRESSION:  1.  No CT evidence of acute intracranial pathology.  2.  Moderate chronic microvascular ischemic changes.    < end of copied text >    < from: CT Angio Head w/ IV Cont (03.07.21 @ 20:12) >  IMPRESSION:  1.  Normal CT angiogram of the head and neck.  2.  Using Tmax >6 seconds there is decreased perfusion of the bilateral temporal lobes, likely artifactual in nature given location and distribution.  3.  Otherwise no evidence of acute ischemia.    < end of copied text >

## 2021-03-08 NOTE — CHART NOTE - NSCHARTNOTEFT_GEN_A_CORE
NEUROCRIT    MRI reviewed. No official read, but my prelim is acute infarct in posterior limb of right internal capsule without acute hemorrhage.    Because MRI performed at 24 hr alexey post-tPA, can cancel CT.    Start ASA and pharmacologic VTE ppx given no post-tPA hemorrhage.    All other recs as per previously written progress and consult notes.

## 2021-03-08 NOTE — PATIENT PROFILE ADULT - NSPRONUTRITIONRISK_GEN_A_NUR
Normal Normal Normal Normal Normal Normal Normal Normal Normal Normal Normal Normal Normal Normal Normal Normal Normal Normal Normal Normal Normal Normal Normal No indicators present Normal

## 2021-03-08 NOTE — PROGRESS NOTE ADULT - ASSESSMENT
Assessment:    Suspected Stroke s/p TPA     Plan:    Neuro: Neuro checks q 1 hour. Repeat Head CT later today (24 hours post TPA). Pending MRI.     CV:  Keep sbp <185/105 and >110/60. Continue lipitor 80 mg daily. Repeat EKG. Pending TTE.     Resp: Maintain spo2 > 94%, currently saturating well on Room Air     Renal: Monitor I & O     ID: Monitor off AB     GI: Tolerating PO diet, passed dysphagia screen     Hematology: SCD for dvt prophylaxis for now     Endo: Send TSH.     Musculoskeletal: bedrest 12 hours post tpa     Ischemic Stroke Work-up:    CTH: [ ] pending 24 hour post tpa head ct   Lipid panel:   Serum Cholesterol: 317 mg/dL (03.07.21 @ 19:40)  Triglycerides: 664 mg/dL (03.07.21 @ 19:40)  HDL: 29 mg/dL (03.07.21 @ 19:40)  Non-HDL Cholesterol: 288  LDL: 175 mg/dL (03.07.21 @ 19:40)  HgA1C: 6.4  TSH: [ ]   2D echo/TTE: [ ]  A/C: Hold A/C for now   Statin: Lipitor 80 mg daily   Vascular Studies: Completed 3/7/21, Normal CT angiogram of the head and neck. sing Tmax >6 seconds there is decreased perfusion of the bilateral temporal lobes, likely artifactual in nature given location and distribution. No evidence of acute ischemia.      Kimberley Carrillo NP   #[id number]

## 2021-03-09 LAB
GLUCOSE BLDC GLUCOMTR-MCNC: 126 MG/DL — HIGH (ref 70–99)
GLUCOSE BLDC GLUCOMTR-MCNC: 127 MG/DL — HIGH (ref 70–99)
GLUCOSE BLDC GLUCOMTR-MCNC: 165 MG/DL — HIGH (ref 70–99)
GLUCOSE BLDC GLUCOMTR-MCNC: 230 MG/DL — HIGH (ref 70–99)

## 2021-03-09 PROCEDURE — 99232 SBSQ HOSP IP/OBS MODERATE 35: CPT

## 2021-03-09 PROCEDURE — 93306 TTE W/DOPPLER COMPLETE: CPT | Mod: 26

## 2021-03-09 RX ADMIN — Medication 81 MILLIGRAM(S): at 11:05

## 2021-03-09 RX ADMIN — Medication 4: at 11:05

## 2021-03-09 RX ADMIN — ATORVASTATIN CALCIUM 80 MILLIGRAM(S): 80 TABLET, FILM COATED ORAL at 21:07

## 2021-03-09 NOTE — PROGRESS NOTE ADULT - SUBJECTIVE AND OBJECTIVE BOX
60y Female    HPI:    60 year old female patient presented for syncope and left sided weakness. PMH manic depressive, type II diabetes not on any home medicaiton, active smoker 46PY, right handed. Current history goes back to 6:00pm of the day of presentation when the patient nazia from a seated position and had an unwitnessed syncope. Pt afterwards noted difficulty walking and falling towards her left side. Noted weakness in her left lower extremity, called EMS and presented to ED. Last well known 1700. BP on arrival 224/97 . CTH was negative for acute intracranial findings. Patient was evaluated by neuro attending on call Dr Taylor via telestroke. Patient was a tpa candidate. Risks and benefits of tpa was explained to patient in detail including the 6% chance of bleeding and patient agreed to IV TPA. CTA/P was negative for LVO and perfusion deficits. TPA was given 3/7 at 19:44 pm. Pt has f/u MRI 3/8/21 PM, + Right thalamocapsular infarct. Pt started on Aspirin 81 mg daily here in the hospital.     PAST MEDICAL & SURGICAL HISTORY:  Bipolar disorder in partial remission, most recent episode unspecified type  Last episodes six years ago    Diabetes mellitus    History of hysterectomy for malignancy      Home Medications:  Prolixin 2.5 mg/mL injectable solution: 1 application injectable every 2 weeks.  Last dose taken on 3/5/21 (07 Mar 2021 22:04)    Current Medications  aspirin  chewable 81 milliGRAM(s) Oral daily  atorvastatin 80 milliGRAM(s) Oral at bedtime  dextrose 40% Gel 15 Gram(s) Oral once  dextrose 5%. 1000 milliLiter(s) IV Continuous <Continuous>  dextrose 5%. 1000 milliLiter(s) IV Continuous <Continuous>  dextrose 50% Injectable 25 Gram(s) IV Push once  dextrose 50% Injectable 12.5 Gram(s) IV Push once  dextrose 50% Injectable 25 Gram(s) IV Push once  glucagon  Injectable 1 milliGRAM(s) IntraMuscular once  influenza   Vaccine 0.5 milliLiter(s) IntraMuscular once  insulin lispro (ADMELOG) corrective regimen sliding scale   SubCutaneous three times a day before meals  labetalol Injectable 10 milliGRAM(s) IV Push every 10 minutes  niCARdipine Infusion 5 mG/Hr IV Continuous <Continuous>            T(F): 97 (03-09-21 @ 10:00), Max: 99.1 (03-09-21 @ 00:00)  HR: 86 (03-09-21 @ 11:00) (64 - 100)  BP: 144/75 (03-09-21 @ 11:00) (112/59 - 150/73)  RR: 20 (03-09-21 @ 11:00) (11 - 22)  SpO2: 96% (03-09-21 @ 11:00) (93% - 100%)                        15.6   10.03 )-----------( 285      ( 08 Mar 2021 05:46 )             46.0     03-08    140  |  101  |  8<L>  ----------------------------<  99  4.1   |  24  |  0.6<L>    Ca    9.2      08 Mar 2021 05:46  Mg     2.2     03-08    TPro  6.8  /  Alb  4.1  /  TBili  <0.2  /  DBili  x   /  AST  12  /  ALT  12  /  AlkPhos  86  03-08    PT/INR - ( 07 Mar 2021 19:40 )   PT: 11.20 sec;   INR: 0.97 ratio         PTT - ( 07 Mar 2021 19:40 )  PTT:36.9 sec    LIVER FUNCTIONS - ( 08 Mar 2021 05:46 )  Alb: 4.1 g/dL / Pro: 6.8 g/dL / ALK PHOS: 86 U/L / ALT: 12 U/L / AST: 12 U/L / GGT: x           CARDIAC MARKERS ( 07 Mar 2021 19:40 )  x     / <0.01 ng/mL / x     / x     / x                I&O's Detail    08 Mar 2021 07:01  -  09 Mar 2021 07:00  --------------------------------------------------------  IN:    Oral Fluid: 360 mL  Total IN: 360 mL    OUT:    Voided (mL): 250 mL  Total OUT: 250 mL    Total NET: 110 mL      09 Mar 2021 07:01  -  09 Mar 2021 12:56  --------------------------------------------------------  IN:  Total IN: 0 mL    OUT:    Voided (mL): 300 mL  Total OUT: 300 mL    Total NET: -300 mL      NIHSS: 5 on arrival     Neuro Exam:    Neurological:  Mental Status: Alert and Oriented to person, place, and time, cooperative, pleasant. Affect appropriate, thought, speech, and language coherent. Short- and long-term memory, abstract thinking and calculation intact.  Cranial Nerves:  I: intact  II, III, IV, VI: PERRLA, EOM intact. Gracia intact by confrontation. No cranial nerve palsy or nystagmus noted.  V: facial sensation intact; masseter/ temporal muscles intact, corneal reflex intact bilaterally  VII: face symmetrical at rest and with expression  VIII: intact to finger rub in the right ear and left ear  IX and X: no hoarseness, gag intact, palate/ uvula rise symmetrically  XI: SCM/ trapezius strength intact bilateral  XII: no dysarthria, tongue weakness/fasiculation   Motor: Normal muscle bulk/tone. Good posture. Strength 5+/5+ upper & lower extremities  Sensory: Sensation to light touch, pain, vibration, proprioception, and stereognosis intact to trunk and bilaterally to both upper and lower extremities. Normal two point discrimination.   Cerebellar Function: Normal gait including tandem, heel and toe walking. Romberg and pronator drift negative. Normal rapid alternating movements and point to point test.  Reflexes: No clonus    Last CTH:  < from: CT Brain Stroke Protocol (03.07.21 @ 19:31) >    IMPRESSION:  1.  No CT evidence of acute intracranial pathology.  2.  Moderate chronic microvascular ischemic changes.    < end of copied text >    Last CTA/MRA:    Last CTP:    Last MRI:  < from: MR Head No Cont (03.08.21 @ 18:10) >    IMPRESSION:    Small focus of acute ischemic change in the lateral aspect of the right thalamus.    Thecase was discussed with Dr. Cedeño on March 9, 2021 time 10:35 AM, with read back.    < end of copied text >      Last Echo:  < from: TTE Echo Complete w/o Contrast w/ Doppler (03.09.21 @ 09:11) >    Summary:   1. Normal global left ventricular systolic function.   2. LV Ejection Fraction by Ibarra's Method with a biplane EF of 69 %.   3. Increased LV wall thickness.   4. Normal left ventricular internal cavity size.   5. Spectral Doppler shows impaired relaxation pattern of left ventricular myocardial filling (Grade I diastolic dysfunction).   6. Normal left atrial size.   7. Normal right atrial size.   8. Mild mitral annular calcification.   9. Mild thickening of the anterior and posterior mitral valve leaflets.  10. No evidence of mitral valve regurgitation.  11. Mild tricuspid regurgitation.    < end of copied text >   60y Female    HPI:    60 year old female patient presented for syncope and left sided weakness. PMH manic depressive, type II diabetes not on any home medicaiton, active smoker 46PY, right handed. Current history goes back to 6:00pm of the day of presentation when the patient nazia from a seated position and had an unwitnessed syncope. Pt afterwards noted difficulty walking and falling towards her left side. Noted weakness in her left lower extremity, called EMS and presented to ED. Last well known 1700. BP on arrival 224/97 . CTH was negative for acute intracranial findings. Patient was evaluated by neuro attending on call Dr Taylor via telestroke. Patient was a tpa candidate. Risks and benefits of tpa was explained to patient in detail including the 6% chance of bleeding and patient agreed to IV TPA. CTA/P was negative for LVO and perfusion deficits. TPA was given 3/7 at 19:44 pm. Pt has f/u MRI 3/8/21 PM, + Right thalamocapsular infarct. Pt started on Aspirin 81 mg daily here in the hospital.     PAST MEDICAL & SURGICAL HISTORY:  Bipolar disorder in partial remission, most recent episode unspecified type  Last episodes six years ago    Diabetes mellitus    History of hysterectomy for malignancy      Home Medications:  Prolixin 2.5 mg/mL injectable solution: 1 application injectable every 2 weeks.  Last dose taken on 3/5/21 (07 Mar 2021 22:04)    Current Medications  aspirin  chewable 81 milliGRAM(s) Oral daily  atorvastatin 80 milliGRAM(s) Oral at bedtime  dextrose 40% Gel 15 Gram(s) Oral once  dextrose 5%. 1000 milliLiter(s) IV Continuous <Continuous>  dextrose 5%. 1000 milliLiter(s) IV Continuous <Continuous>  dextrose 50% Injectable 25 Gram(s) IV Push once  dextrose 50% Injectable 12.5 Gram(s) IV Push once  dextrose 50% Injectable 25 Gram(s) IV Push once  glucagon  Injectable 1 milliGRAM(s) IntraMuscular once  influenza   Vaccine 0.5 milliLiter(s) IntraMuscular once  insulin lispro (ADMELOG) corrective regimen sliding scale   SubCutaneous three times a day before meals  labetalol Injectable 10 milliGRAM(s) IV Push every 10 minutes  niCARdipine Infusion 5 mG/Hr IV Continuous <Continuous>            T(F): 97 (03-09-21 @ 10:00), Max: 99.1 (03-09-21 @ 00:00)  HR: 86 (03-09-21 @ 11:00) (64 - 100)  BP: 144/75 (03-09-21 @ 11:00) (112/59 - 150/73)  RR: 20 (03-09-21 @ 11:00) (11 - 22)  SpO2: 96% (03-09-21 @ 11:00) (93% - 100%)                        15.6   10.03 )-----------( 285      ( 08 Mar 2021 05:46 )             46.0     03-08    140  |  101  |  8<L>  ----------------------------<  99  4.1   |  24  |  0.6<L>    Ca    9.2      08 Mar 2021 05:46  Mg     2.2     03-08    TPro  6.8  /  Alb  4.1  /  TBili  <0.2  /  DBili  x   /  AST  12  /  ALT  12  /  AlkPhos  86  03-08    PT/INR - ( 07 Mar 2021 19:40 )   PT: 11.20 sec;   INR: 0.97 ratio         PTT - ( 07 Mar 2021 19:40 )  PTT:36.9 sec    LIVER FUNCTIONS - ( 08 Mar 2021 05:46 )  Alb: 4.1 g/dL / Pro: 6.8 g/dL / ALK PHOS: 86 U/L / ALT: 12 U/L / AST: 12 U/L / GGT: x           CARDIAC MARKERS ( 07 Mar 2021 19:40 )  x     / <0.01 ng/mL / x     / x     / x                I&O's Detail    08 Mar 2021 07:01  -  09 Mar 2021 07:00  --------------------------------------------------------  IN:    Oral Fluid: 360 mL  Total IN: 360 mL    OUT:    Voided (mL): 250 mL  Total OUT: 250 mL    Total NET: 110 mL      09 Mar 2021 07:01  -  09 Mar 2021 12:56  --------------------------------------------------------  IN:  Total IN: 0 mL    OUT:    Voided (mL): 300 mL  Total OUT: 300 mL    Total NET: -300 mL      NIHSS: 5 on arrival     Neuro Exam:    Neurological:  Mental Status: Alert and Oriented to person, place, and time, cooperative, pleasant. Affect appropriate, thought, speech, and language coherent. Short- and long-term memory, abstract thinking and calculation intact.  Cranial Nerves:  II, III, IV, VI: PERRLA, EOM intact. Garcia intact by confrontation. No cranial nerve palsy or nystagmus noted.  V: facial sensation intact; v1-v3, masseter/ temporal muscles intact, corneal reflex intact bilaterally  VII: Left facial nasolabial fold flattening present at rest and with expression   VIII: intact to finger rub in the right ear and left ear  IX and X: no hoarseness, gag intact, palate/ uvula rise symmetrically  XI: SCM/ trapezius strength intact bilateral  XII: no dysarthria, tongue weakness/fasiculation   Motor: Normal muscle bulk/tone. Good posture. Strength 5+/5+ upper & lower extremities (improved from yesterday)   Sensory: Sensation to light touch, pain, vibration, proprioception, and stereognosis intact to trunk and bilaterally to both upper and lower extremities. Normal two point discrimination.   Cerebellar Function: Normal gait including tandem, heel and toe walking. Romberg and pronator drift negative. Normal rapid alternating movements and point to point test.  Reflexes: No clonus    Last CTH:  < from: CT Brain Stroke Protocol (03.07.21 @ 19:31) >    IMPRESSION:  1.  No CT evidence of acute intracranial pathology.  2.  Moderate chronic microvascular ischemic changes.    < end of copied text >    Last CTA/MRA:    Last CTP:    Last MRI:  < from: MR Head No Cont (03.08.21 @ 18:10) >    IMPRESSION:    Small focus of acute ischemic change in the lateral aspect of the right thalamus.    Thecase was discussed with Dr. Cedeño on March 9, 2021 time 10:35 AM, with read back.    < end of copied text >      Last Echo:  < from: TTE Echo Complete w/o Contrast w/ Doppler (03.09.21 @ 09:11) >    Summary:   1. Normal global left ventricular systolic function.   2. LV Ejection Fraction by Ibarra's Method with a biplane EF of 69 %.   3. Increased LV wall thickness.   4. Normal left ventricular internal cavity size.   5. Spectral Doppler shows impaired relaxation pattern of left ventricular myocardial filling (Grade I diastolic dysfunction).   6. Normal left atrial size.   7. Normal right atrial size.   8. Mild mitral annular calcification.   9. Mild thickening of the anterior and posterior mitral valve leaflets.  10. No evidence of mitral valve regurgitation.  11. Mild tricuspid regurgitation.    < end of copied text >

## 2021-03-09 NOTE — PROGRESS NOTE ADULT - ASSESSMENT
Assessment:    Right thalamocapsular infarct on MRI     Plan:    Neuro: MRI appreciated. Neuro checks q 6 hours     CV:  Keep sbp normalized. Continue lipitor 80 mg daily. Aspirin 80 mg daily     Resp: Maintain spo2 > 94%, currently saturating well on Room Air     Renal: Monitor I & O     ID: Monitor off AB     GI: Tolerating PO diet, passed dysphagia screen     Hematology: SCD. OK to start lovenox/heparin for dvt prophylaxis     Endo: Send TSH.     Musculoskeletal: pt/ot     Ischemic Stroke Work-up:    Lipid panel:   Serum Cholesterol: 317 mg/dL (03.07.21 @ 19:40)  Triglycerides: 664 mg/dL (03.07.21 @ 19:40)  HDL: 29 mg/dL (03.07.21 @ 19:40)  Non-HDL Cholesterol: 288  LDL: 175 mg/dL (03.07.21 @ 19:40)  HgA1C: 6.4  TSH: [ ]   2D echo/TTE: completed   A/C: Hold A/C for now   Statin: Lipitor 80 mg daily   Vascular Studies: Completed 3/7/21, Normal CT angiogram of the head and neck. sing Tmax >6 seconds there is decreased perfusion of the bilateral temporal lobes, likely artifactual in nature given location and distribution. No evidence of acute ischemia.    Can downgrade to telemetry floor     Kimberley Carrillo NP   #[id number]

## 2021-03-09 NOTE — PROGRESS NOTE ADULT - ASSESSMENT
IMPRESSION:    CVA s/p tpa    PLAN:    CNS: neuro check q4h, neuro following    HEENT: Oral care    PULMONARY:  HOB @ 45 degrees    CARDIOVASCULAR: f/u 2d echo    GI: GI prophylaxis.  Feeding per speech    RENAL:  Follow up lytes.  Correct as needed    INFECTIOUS DISEASE: Follow up cultures    HEMATOLOGICAL:  DVT prophylaxis.    ENDOCRINE:  Follow up FS.  Insulin protocol if needed.    MUSCULOSKELETAL: PT OT consult    Downgrade to stroke unit

## 2021-03-09 NOTE — PROGRESS NOTE ADULT - SUBJECTIVE AND OBJECTIVE BOX
Patient is a 60y old  Female who presents with a chief complaint of Left sided weakness (09 Mar 2021 12:55)        Over Night Events: No events        ROS:     All ROS are negative except HPI         PHYSICAL EXAM    ICU Vital Signs Last 24 Hrs  T(C): 36.7 (09 Mar 2021 14:00), Max: 37.3 (09 Mar 2021 00:00)  T(F): 98 (09 Mar 2021 14:00), Max: 99.1 (09 Mar 2021 00:00)  HR: 81 (09 Mar 2021 17:00) (64 - 86)  BP: 135/74 (09 Mar 2021 17:00) (112/59 - 154/78)  BP(mean): 81 (09 Mar 2021 17:00) (81 - 102)  ABP: --  ABP(mean): --  RR: 18 (09 Mar 2021 17:00) (11 - 22)  SpO2: 98% (09 Mar 2021 17:00) (93% - 98%)      CONSTITUTIONAL:  Well nourished.  NAD    ENT:   Airway patent,   Mouth with normal mucosa.   No thrush    EYES:   Pupils equal,   Round and reactive to light.    CARDIAC:   Normal rate,   Regular rhythm.    No edema      Vascular:  Normal systolic impulse  No Carotid bruits    RESPIRATORY:   No wheezing  Bilateral BS  Normal chest expansion  Not tachypneic,  No use of accessory muscles    GASTROINTESTINAL:  Abdomen soft,   Non-tender,   No guarding,   + BS    MUSCULOSKELETAL:   Range of motion is not limited,  No clubbing, cyanosis    NEUROLOGICAL:   Alert and oriented   No motor  deficits.    SKIN:   Skin normal color for race,   Warm and dry and intact.   No evidence of rash.    PSYCHIATRIC:   Normal mood and affect.   No apparent risk to self or others.    HEMATOLOGICAL:  No cervical  lymphadenopathy.  no inguinal lymphadenopathy      03-08-21 @ 07:01  -  03-09-21 @ 07:00  --------------------------------------------------------  IN:    Oral Fluid: 360 mL  Total IN: 360 mL    OUT:    Voided (mL): 250 mL  Total OUT: 250 mL    Total NET: 110 mL      03-09-21 @ 07:01  -  03-09-21 @ 17:39  --------------------------------------------------------  IN:  Total IN: 0 mL    OUT:    Voided (mL): 550 mL  Total OUT: 550 mL    Total NET: -550 mL          LABS:                            15.6   10.03 )-----------( 285      ( 08 Mar 2021 05:46 )             46.0                                               03-08    140  |  101  |  8<L>  ----------------------------<  99  4.1   |  24  |  0.6<L>    Ca    9.2      08 Mar 2021 05:46  Mg     2.2     03-08    TPro  6.8  /  Alb  4.1  /  TBili  <0.2  /  DBili  x   /  AST  12  /  ALT  12  /  AlkPhos  86  03-08      PT/INR - ( 07 Mar 2021 19:40 )   PT: 11.20 sec;   INR: 0.97 ratio         PTT - ( 07 Mar 2021 19:40 )  PTT:36.9 sec                                           CARDIAC MARKERS ( 07 Mar 2021 19:40 )  x     / <0.01 ng/mL / x     / x     / x                                                LIVER FUNCTIONS - ( 08 Mar 2021 05:46 )  Alb: 4.1 g/dL / Pro: 6.8 g/dL / ALK PHOS: 86 U/L / ALT: 12 U/L / AST: 12 U/L / GGT: x                                                                                                                                       MEDICATIONS  (STANDING):  aspirin  chewable 81 milliGRAM(s) Oral daily  atorvastatin 80 milliGRAM(s) Oral at bedtime  dextrose 40% Gel 15 Gram(s) Oral once  dextrose 5%. 1000 milliLiter(s) (50 mL/Hr) IV Continuous <Continuous>  dextrose 5%. 1000 milliLiter(s) (100 mL/Hr) IV Continuous <Continuous>  dextrose 50% Injectable 25 Gram(s) IV Push once  dextrose 50% Injectable 12.5 Gram(s) IV Push once  dextrose 50% Injectable 25 Gram(s) IV Push once  glucagon  Injectable 1 milliGRAM(s) IntraMuscular once  influenza   Vaccine 0.5 milliLiter(s) IntraMuscular once  insulin lispro (ADMELOG) corrective regimen sliding scale   SubCutaneous three times a day before meals  labetalol Injectable 10 milliGRAM(s) IV Push every 10 minutes  niCARdipine Infusion 5 mG/Hr (25 mL/Hr) IV Continuous <Continuous>    MEDICATIONS  (PRN):      New X-rays reviewed:                                                                                  ECHO    CXR interpreted by me:       Patient is a 60y old  Female who presents with a chief complaint of Left sided weakness (09 Mar 2021 12:55)        Over Night Events: No events.  NO HA.  Ambulating in unit         ROS:     All ROS are negative except HPI         PHYSICAL EXAM    ICU Vital Signs Last 24 Hrs  T(C): 36.7 (09 Mar 2021 14:00), Max: 37.3 (09 Mar 2021 00:00)  T(F): 98 (09 Mar 2021 14:00), Max: 99.1 (09 Mar 2021 00:00)  HR: 81 (09 Mar 2021 17:00) (64 - 86)  BP: 135/74 (09 Mar 2021 17:00) (112/59 - 154/78)  BP(mean): 81 (09 Mar 2021 17:00) (81 - 102)  ABP: --  ABP(mean): --  RR: 18 (09 Mar 2021 17:00) (11 - 22)  SpO2: 98% (09 Mar 2021 17:00) (93% - 98%)      CONSTITUTIONAL:  Well nourished.  NAD    ENT:   Airway patent,   Mouth with normal mucosa.   No thrush    EYES:   Pupils equal,   Round and reactive to light.    CARDIAC:   Normal rate,   Regular rhythm.    No edema      Vascular:  Normal systolic impulse  No Carotid bruits    RESPIRATORY:   No wheezing  Bilateral BS  Normal chest expansion  Not tachypneic,  No use of accessory muscles    GASTROINTESTINAL:  Abdomen soft,   Non-tender,   No guarding,   + BS    MUSCULOSKELETAL:   Range of motion is not limited,  No clubbing, cyanosis    NEUROLOGICAL:   Alert and oriented   No motor  deficits.    SKIN:   Skin normal color for race,   Warm and dry and intact.   No evidence of rash.    PSYCHIATRIC:   Normal mood and affect.   No apparent risk to self or others.    HEMATOLOGICAL:  No cervical  lymphadenopathy.  no inguinal lymphadenopathy      03-08-21 @ 07:01  -  03-09-21 @ 07:00  --------------------------------------------------------  IN:    Oral Fluid: 360 mL  Total IN: 360 mL    OUT:    Voided (mL): 250 mL  Total OUT: 250 mL    Total NET: 110 mL      03-09-21 @ 07:01  -  03-09-21 @ 17:39  --------------------------------------------------------  IN:  Total IN: 0 mL    OUT:    Voided (mL): 550 mL  Total OUT: 550 mL    Total NET: -550 mL          LABS:                            15.6   10.03 )-----------( 285      ( 08 Mar 2021 05:46 )             46.0                                               03-08    140  |  101  |  8<L>  ----------------------------<  99  4.1   |  24  |  0.6<L>    Ca    9.2      08 Mar 2021 05:46  Mg     2.2     03-08    TPro  6.8  /  Alb  4.1  /  TBili  <0.2  /  DBili  x   /  AST  12  /  ALT  12  /  AlkPhos  86  03-08      PT/INR - ( 07 Mar 2021 19:40 )   PT: 11.20 sec;   INR: 0.97 ratio         PTT - ( 07 Mar 2021 19:40 )  PTT:36.9 sec                                           CARDIAC MARKERS ( 07 Mar 2021 19:40 )  x     / <0.01 ng/mL / x     / x     / x                                                LIVER FUNCTIONS - ( 08 Mar 2021 05:46 )  Alb: 4.1 g/dL / Pro: 6.8 g/dL / ALK PHOS: 86 U/L / ALT: 12 U/L / AST: 12 U/L / GGT: x                                                                                                                                       MEDICATIONS  (STANDING):  aspirin  chewable 81 milliGRAM(s) Oral daily  atorvastatin 80 milliGRAM(s) Oral at bedtime  dextrose 40% Gel 15 Gram(s) Oral once  dextrose 5%. 1000 milliLiter(s) (50 mL/Hr) IV Continuous <Continuous>  dextrose 5%. 1000 milliLiter(s) (100 mL/Hr) IV Continuous <Continuous>  dextrose 50% Injectable 25 Gram(s) IV Push once  dextrose 50% Injectable 12.5 Gram(s) IV Push once  dextrose 50% Injectable 25 Gram(s) IV Push once  glucagon  Injectable 1 milliGRAM(s) IntraMuscular once  influenza   Vaccine 0.5 milliLiter(s) IntraMuscular once  insulin lispro (ADMELOG) corrective regimen sliding scale   SubCutaneous three times a day before meals  labetalol Injectable 10 milliGRAM(s) IV Push every 10 minutes  niCARdipine Infusion 5 mG/Hr (25 mL/Hr) IV Continuous <Continuous>    MEDICATIONS  (PRN):      New X-rays reviewed:                                                                                  ECHO    CXR interpreted by me:

## 2021-03-10 ENCOUNTER — TRANSCRIPTION ENCOUNTER (OUTPATIENT)
Age: 61
End: 2021-03-10

## 2021-03-10 VITALS
TEMPERATURE: 98 F | DIASTOLIC BLOOD PRESSURE: 74 MMHG | OXYGEN SATURATION: 96 % | SYSTOLIC BLOOD PRESSURE: 127 MMHG | RESPIRATION RATE: 18 BRPM | HEART RATE: 64 BPM

## 2021-03-10 LAB
ALBUMIN SERPL ELPH-MCNC: 4 G/DL — SIGNIFICANT CHANGE UP (ref 3.5–5.2)
ALP SERPL-CCNC: 83 U/L — SIGNIFICANT CHANGE UP (ref 30–115)
ALT FLD-CCNC: 13 U/L — SIGNIFICANT CHANGE UP (ref 0–41)
ANION GAP SERPL CALC-SCNC: 11 MMOL/L — SIGNIFICANT CHANGE UP (ref 7–14)
AST SERPL-CCNC: 12 U/L — SIGNIFICANT CHANGE UP (ref 0–41)
BASOPHILS # BLD AUTO: 0.06 K/UL — SIGNIFICANT CHANGE UP (ref 0–0.2)
BASOPHILS NFR BLD AUTO: 0.6 % — SIGNIFICANT CHANGE UP (ref 0–1)
BILIRUB SERPL-MCNC: 0.4 MG/DL — SIGNIFICANT CHANGE UP (ref 0.2–1.2)
BUN SERPL-MCNC: 18 MG/DL — SIGNIFICANT CHANGE UP (ref 10–20)
CALCIUM SERPL-MCNC: 9.8 MG/DL — SIGNIFICANT CHANGE UP (ref 8.5–10.1)
CHLORIDE SERPL-SCNC: 100 MMOL/L — SIGNIFICANT CHANGE UP (ref 98–110)
CO2 SERPL-SCNC: 24 MMOL/L — SIGNIFICANT CHANGE UP (ref 17–32)
CREAT SERPL-MCNC: 0.7 MG/DL — SIGNIFICANT CHANGE UP (ref 0.7–1.5)
EOSINOPHIL # BLD AUTO: 0.13 K/UL — SIGNIFICANT CHANGE UP (ref 0–0.7)
EOSINOPHIL NFR BLD AUTO: 1.4 % — SIGNIFICANT CHANGE UP (ref 0–8)
GLUCOSE BLDC GLUCOMTR-MCNC: 165 MG/DL — HIGH (ref 70–99)
GLUCOSE BLDC GLUCOMTR-MCNC: 170 MG/DL — HIGH (ref 70–99)
GLUCOSE SERPL-MCNC: 125 MG/DL — HIGH (ref 70–99)
HCT VFR BLD CALC: 45.3 % — SIGNIFICANT CHANGE UP (ref 37–47)
HGB BLD-MCNC: 15.4 G/DL — SIGNIFICANT CHANGE UP (ref 12–16)
IMM GRANULOCYTES NFR BLD AUTO: 0.5 % — HIGH (ref 0.1–0.3)
LYMPHOCYTES # BLD AUTO: 3.62 K/UL — HIGH (ref 1.2–3.4)
LYMPHOCYTES # BLD AUTO: 38.4 % — SIGNIFICANT CHANGE UP (ref 20.5–51.1)
MAGNESIUM SERPL-MCNC: 2.3 MG/DL — SIGNIFICANT CHANGE UP (ref 1.8–2.4)
MCHC RBC-ENTMCNC: 31.4 PG — HIGH (ref 27–31)
MCHC RBC-ENTMCNC: 34 G/DL — SIGNIFICANT CHANGE UP (ref 32–37)
MCV RBC AUTO: 92.4 FL — SIGNIFICANT CHANGE UP (ref 81–99)
MONOCYTES # BLD AUTO: 0.84 K/UL — HIGH (ref 0.1–0.6)
MONOCYTES NFR BLD AUTO: 8.9 % — SIGNIFICANT CHANGE UP (ref 1.7–9.3)
NEUTROPHILS # BLD AUTO: 4.72 K/UL — SIGNIFICANT CHANGE UP (ref 1.4–6.5)
NEUTROPHILS NFR BLD AUTO: 50.2 % — SIGNIFICANT CHANGE UP (ref 42.2–75.2)
NRBC # BLD: 0 /100 WBCS — SIGNIFICANT CHANGE UP (ref 0–0)
PLATELET # BLD AUTO: 275 K/UL — SIGNIFICANT CHANGE UP (ref 130–400)
POTASSIUM SERPL-MCNC: 4.5 MMOL/L — SIGNIFICANT CHANGE UP (ref 3.5–5)
POTASSIUM SERPL-SCNC: 4.5 MMOL/L — SIGNIFICANT CHANGE UP (ref 3.5–5)
PROT SERPL-MCNC: 6.9 G/DL — SIGNIFICANT CHANGE UP (ref 6–8)
RBC # BLD: 4.9 M/UL — SIGNIFICANT CHANGE UP (ref 4.2–5.4)
RBC # FLD: 13 % — SIGNIFICANT CHANGE UP (ref 11.5–14.5)
SODIUM SERPL-SCNC: 135 MMOL/L — SIGNIFICANT CHANGE UP (ref 135–146)
WBC # BLD: 9.42 K/UL — SIGNIFICANT CHANGE UP (ref 4.8–10.8)
WBC # FLD AUTO: 9.42 K/UL — SIGNIFICANT CHANGE UP (ref 4.8–10.8)

## 2021-03-10 PROCEDURE — 99238 HOSP IP/OBS DSCHRG MGMT 30/<: CPT | Mod: AI

## 2021-03-10 RX ORDER — ATORVASTATIN CALCIUM 80 MG/1
1 TABLET, FILM COATED ORAL
Qty: 30 | Refills: 2
Start: 2021-03-10 | End: 2021-06-07

## 2021-03-10 RX ORDER — ASPIRIN/CALCIUM CARB/MAGNESIUM 324 MG
1 TABLET ORAL
Qty: 30 | Refills: 2
Start: 2021-03-10 | End: 2021-06-07

## 2021-03-10 RX ADMIN — Medication 81 MILLIGRAM(S): at 12:53

## 2021-03-10 NOTE — DISCHARGE NOTE PROVIDER - NSDCMRMEDTOKEN_GEN_ALL_CORE_FT
aspirin 81 mg oral tablet, chewable: 1 tab(s) orally once a day  atorvastatin 80 mg oral tablet: 1 tab(s) orally once a day (at bedtime)  Prolixin 2.5 mg/mL injectable solution: 1 application injectable every 2 weeks.  Last dose taken on 3/5/21

## 2021-03-10 NOTE — DISCHARGE NOTE PROVIDER - HOSPITAL COURSE
60 year old female w/ pmhx of manic depressive disorder, type II diabetes not on any treatment, active smoker 46PY, right handed presented for syncope and left sided weakness.   Current history goes back to 6:00pm of the day of presentation when the patient nazia from a seated position and had an unwitnessed syncope.   Paitnet afterwards noted difficulty walking and falling towards her left side. Noted weakness in her left lower extremity, called EMS and presented for assessment.     Last well known 1700.  BP on arrival 224/97 . CTH was negative for acute intracranial findings. Patient was evaluated by neuro attending on call Dr Taylor via telestroke. Patient was a tpa candidate. Risks and benefits of tpa was explained to patient in detail including the 6% chance of bleeding and patient agreed to IV TPA. CTA/P was negative for LVO and perfusion deficits.    Hospital course:   Patient was admitted to step down unit. Code stroke was called. CT head stroke protocol showed poorly defined area of low density involving the left lateral basal ganglia/putamen which may represent ischemic infarct. CT angio head/neck was normal. MRI head 3/8 found small focus of acute ischemic change in the lateral aspect of the right thalamus. Patient was weaned off nicardipine drip with normal BP and also passed dysphagia screen. She was started on asa 81 and lipitor 80mg. Patient was downgraded to stroke unit, was independent with PT/OT, and stable for discharge.        60 year old female w/ pmhx of manic depressive disorder, type II diabetes not on any treatment, active smoker 46PY, right handed presented for syncope and left sided weakness.   Current history goes back to 6:00pm of the day of presentation when the patient nazia from a seated position and had an unwitnessed syncope.   Paitnet afterwards noted difficulty walking and falling towards her left side. Noted weakness in her left lower extremity, called EMS and presented for assessment.     Last well known 1700.  BP on arrival 224/97 . CTH was negative for acute intracranial findings. Patient was evaluated by neuro attending on call Dr Taylor via telestroke. Patient was a tpa candidate. Risks and benefits of tpa was explained to patient in detail including the 6% chance of bleeding and patient agreed to IV TPA. CTA/P was negative for LVO and perfusion deficits.    Hospital course:   Patient was admitted to step down unit. Code stroke was called. CT head stroke protocol showed poorly defined area of low density involving the left lateral basal ganglia/putamen which may represent ischemic infarct. CT angio head/neck was normal. MRI head 3/8 found small focus of acute ischemic change in the lateral aspect of the right thalamus. Patient was weaned off nicardipine drip with normal BP and also passed dysphagia screen. She was started on asa 81 and lipitor 80mg. Patient was downgraded to stroke unit, was independent with PT/OT, and stable for discharge.     Patient seen and examined on discharge and NIHSS 1 with mrankin 0. Counseled on vascular risk factors as cause of her stroke.  Went over new medications and findings of testing in hospital .  Time spent 35 minutes

## 2021-03-10 NOTE — OCCUPATIONAL THERAPY INITIAL EVALUATION ADULT - GENERAL OBSERVATIONS, REHAB EVAL
Pt encountered and left seated EOB, +tele, +BPcuff, +spO2 monitor, +call bell, +IV lock, +bed alarm on RA.

## 2021-03-10 NOTE — PHYSICAL THERAPY INITIAL EVALUATION ADULT - PERTINENT HX OF CURRENT PROBLEM, REHAB EVAL
Pt is a 61 y/o female admitted for Current history goes back to 6:00pm of the day of presentation when the patient nazia from a seated position and had an unwitnessed syncope.

## 2021-03-10 NOTE — DISCHARGE NOTE PROVIDER - CARE PROVIDER_API CALL
Kev Scherer)  EEGEpilepsy; Neurology  [address] [place] [address], [address]  [place] [address]  Phone: [phone]  Fax: [phone]  Follow Up Time: 1 week

## 2021-03-10 NOTE — DISCHARGE NOTE NURSING/CASE MANAGEMENT/SOCIAL WORK - PATIENT PORTAL LINK FT
You can access the FollowMyHealth Patient Portal offered by [facility] by registering at the following website: [email or web address]. By joining [facility]’s FollowMyHealth portal, you will also be able to view your health information using other applications (apps) compatible with our system.

## 2021-03-10 NOTE — DISCHARGE NOTE PROVIDER - NSDCCPCAREPLAN_GEN_ALL_CORE_FT
PRINCIPAL DISCHARGE DIAGNOSIS  Diagnosis: Stroke  Assessment and Plan of Treatment: You had a stroke in your brain. This is when a vessel has a blockage and blood flow is impaired and causes tissue death. You received TPA which is a strong blood thinner that is used to break up the clot. You were started on cholesterol medication and aspirin. Please follow up at the stroke clinic.      SECONDARY DISCHARGE DIAGNOSES  Diagnosis: Type 2 diabetes mellitus  Assessment and Plan of Treatment: You have diabetes that is untreated. Your A1C is 6.4 which puts you in the diabetic range. Please follow up with your primary care doctor regarding getting treatment for diabetes since controlling blood sugar can also prevent strokes.    Diagnosis: Hyperlipidemia  Assessment and Plan of Treatment: You had uncontrolled blood cholesterol. Please take your cholesterol medication (lipitor) which will prevent both strokes and heart attacks in the future. Follow up with your primary care doctors to repeat a cholesterol level to see if it improves with being on the medication.

## 2021-03-10 NOTE — PROGRESS NOTE ADULT - ASSESSMENT
60 year old female w/ pmhx of manic depressive disorder, type II diabetes not on any treatment, active smoker 46PY, right handed presented for syncope and left sided weakness. Patient found to have right sided thalamic stroke.     # R thalamocapsular stroke s/p TPA   - TPA given     > TPA bolus dose: 6.7 ml (7.44pm)    > TPA infusion dose: 60.4 ml     > initiated at 7:45pm  - neuro checks: q4hrs  - CT head stroke protocol: poorly defined area of low density involving the left lateral basal ganglia/putamen which may represent ischemic infarct  - CT angio head/neck: normal  - MRI head 3/8: Small focus of acute ischemic change in the lateral aspect of the right thalamus.  - atorvastatin 80mg hs and asa 81mg daily   - Keep sbp <185/105 and >110/60  - off nicardipine drip   - Echo 3/9: EF 69%, G1DD, no thrombus   - Passed dysphagia screen   - will get PT/OT     # Untreated dyslipidemia  - Lipid Profile (03.07.21 @ 19:40)    Cholesterol, Serum: 317 mg/dL    Triglycerides, Serum: 664 mg/dL    HDL Cholesterol, Serum: 29 mg/dL    LDL cholesterol 175 mg/dl  - Started Lipitor 80mg hs    # Untreated DM II  - On insulin scale  - A1c 3/2021: 6.4  - o/p f/u for oral anti-diabetics    # Manic depressive  - Receives Prolixin injection once q2 weeks  - Last dose 3/5/21 according to patient     DVT ppx: SCDs  GI ppx: none  DASH/TLC consistent carbs, passed dysphagia screen  Code Status: FULL CODE  Dispo: acute monitoring in stroke unit; PT/OT [place]      60 year old female w/ pmhx of manic depressive disorder, type II diabetes not on any treatment, active smoker 46PY, right handed presented for syncope and left sided weakness. Patient found to have right sided thalamic stroke.     # R thalamocapsular stroke s/p TPA   - TPA given     > TPA bolus dose: 6.7 ml (7.44pm)    > TPA infusion dose: 60.4 ml     > initiated at 7:45pm  - neuro checks: q4hrs  - CT head stroke protocol: poorly defined area of low density involving the left lateral basal ganglia/putamen which may represent ischemic infarct  - CT angio head/neck: normal  - MRI head 3/8: Small focus of acute ischemic change in the lateral aspect of the right thalamus.  - atorvastatin 80mg hs and asa 81mg daily   - Keep sbp <185/105 and >110/60  - off nicardipine drip   - Echo 3/9: EF 69%, G1DD, no thrombus   - Passed dysphagia screen   - PT/OT eval: patient is independent     # Untreated dyslipidemia  - Lipid Profile (03.07.21 @ 19:40)    Cholesterol, Serum: 317 mg/dL    Triglycerides, Serum: 664 mg/dL    HDL Cholesterol, Serum: 29 mg/dL    LDL cholesterol 175 mg/dl  - Started Lipitor 80mg hs    # Untreated DM II  - On insulin scale  - A1c 3/2021: 6.4  - o/p f/u for oral anti-diabetics    # Manic depressive  - Receives Prolixin injection once q2 weeks  - Last dose 3/5/21 according to patient     DVT ppx: SCDs  GI ppx: none  DASH/TLC consistent carbs, passed dysphagia screen  Code Status: FULL CODE  Dispo: discharge today

## 2021-03-10 NOTE — PROGRESS NOTE ADULT - ATTENDING COMMENTS
History, events, data and scans reviewed, patient examined at bedside. I agree and approved plan of care as outlined above.
History, events, data and scans reviewed, patient examined at bedside. I agree and approved plan of care as outlined above.
Patient seen and examined and agree with above except as noted.  Patients history, notes, labs, imaging, vitals and meds reviewed personally.  Today her exam shows a slight left facial but otherwise her NIHSS is 1.  She was cleared by PT/OT for home and her risk factors were discussed which include HTN, HLD and borderline controlled diabetes.  Etiology of her stroke was related to her vascular risk factors and she was educated about them    Plan as above
Seen and examined with the pulmonary fellow at the bed side.  Impression and plan as outlined above.

## 2021-03-10 NOTE — PHYSICAL THERAPY INITIAL EVALUATION ADULT - THERAPY FREQUENCY, PT EVAL
Spoke with patient per [facility]) patient to be setup for an EMG  His appointment on 9/26/18 has been cancelled until his EMG is completed  no need for PT at this time

## 2021-03-10 NOTE — PROGRESS NOTE ADULT - SUBJECTIVE AND OBJECTIVE BOX
SUBJECTIVE:    Patient is a 60y old Female who presents with a chief complaint of Left sided weakness (09 Mar 2021 17:39)    Currently admitted to medicine with the primary diagnosis of Stroke       Today is hospital day 3d.     Overnight no events.     This morning patient denies headaches, dizziness, or pain. Downgrade from vent. She has visible left facial droop.     PAST MEDICAL & SURGICAL HISTORY  Bipolar disorder in partial remission, most recent episode unspecified type  Last episodes six years ago    Diabetes mellitus    History of hysterectomy for malignancy        ALLERGIES:  penicillin (Other)    MEDICATIONS:  STANDING MEDICATIONS  aspirin  chewable 81 milliGRAM(s) Oral daily  atorvastatin 80 milliGRAM(s) Oral at bedtime  dextrose 40% Gel 15 Gram(s) Oral once  dextrose 5%. 1000 milliLiter(s) IV Continuous <Continuous>  dextrose 5%. 1000 milliLiter(s) IV Continuous <Continuous>  dextrose 50% Injectable 25 Gram(s) IV Push once  dextrose 50% Injectable 12.5 Gram(s) IV Push once  dextrose 50% Injectable 25 Gram(s) IV Push once  glucagon  Injectable 1 milliGRAM(s) IntraMuscular once  influenza   Vaccine 0.5 milliLiter(s) IntraMuscular once  insulin lispro (ADMELOG) corrective regimen sliding scale   SubCutaneous three times a day before meals  labetalol Injectable 10 milliGRAM(s) IV Push every 10 minutes  niCARdipine Infusion 5 mG/Hr IV Continuous <Continuous>    PRN MEDICATIONS    VITALS:   T(F): 98.6  HR: 61  BP: 127/81  RR: 18  SpO2: 95%    LABS:                        15.4   9.42  )-----------( 275      ( 10 Mar 2021 05:25 )             45.3             PHYSICAL EXAM:  GEN: No acute distress  PULM/CHEST: Clear to auscultation bilaterally, no rales, rhonchi or wheezes   CVS: Regular rate and rhythm, S1-S2, no murmurs  ABD: Soft, non-tender, non-distended, +BS  EXT: No edema  NEURO: + left nasolabial flattening; AAOx3, CN 2-12 grossly intact, normal heel-shin test, 5/5 strength in UE and LE b/l, sensation grossly intact

## 2021-03-10 NOTE — OCCUPATIONAL THERAPY INITIAL EVALUATION ADULT - PERTINENT HX OF CURRENT PROBLEM, REHAB EVAL
60 year old female patient presented for syncope and left sided weakness. PMH manic depressive, type II diabetes not on any home medicaiton, active smoker 46PY, right handed. Current history goes back to 6:00pm of the day of presentation when the patient nazia from a seated position and had an unwitnessed syncope. Pt afterwards noted difficulty walking and falling towards her left side.

## 2021-03-11 PROBLEM — Z00.00 ENCOUNTER FOR PREVENTIVE HEALTH EXAMINATION: Status: ACTIVE | Noted: 2021-03-11

## 2021-03-12 PROBLEM — E11.9 TYPE 2 DIABETES MELLITUS WITHOUT COMPLICATIONS: Chronic | Status: ACTIVE | Noted: 2021-03-07

## 2021-03-12 PROBLEM — F31.70 BIPOLAR DISORDER, CURRENTLY IN REMISSION, MOST RECENT EPISODE UNSPECIFIED: Chronic | Status: ACTIVE | Noted: 2021-03-08

## 2021-03-17 DIAGNOSIS — F17.210 NICOTINE DEPENDENCE, CIGARETTES, UNCOMPLICATED: ICD-10-CM

## 2021-03-17 DIAGNOSIS — I10 ESSENTIAL (PRIMARY) HYPERTENSION: ICD-10-CM

## 2021-03-17 DIAGNOSIS — E11.9 TYPE 2 DIABETES MELLITUS WITHOUT COMPLICATIONS: ICD-10-CM

## 2021-03-17 DIAGNOSIS — F31.9 BIPOLAR DISORDER, UNSPECIFIED: ICD-10-CM

## 2021-03-17 DIAGNOSIS — I63.511 CEREBRAL INFARCTION DUE TO UNSPECIFIED OCCLUSION OR STENOSIS OF RIGHT MIDDLE CEREBRAL ARTERY: ICD-10-CM

## 2021-03-17 DIAGNOSIS — E78.5 HYPERLIPIDEMIA, UNSPECIFIED: ICD-10-CM

## 2021-03-17 DIAGNOSIS — G81.94 HEMIPLEGIA, UNSPECIFIED AFFECTING LEFT NONDOMINANT SIDE: ICD-10-CM

## 2021-03-17 DIAGNOSIS — I63.9 CEREBRAL INFARCTION, UNSPECIFIED: ICD-10-CM

## 2021-03-24 ENCOUNTER — APPOINTMENT (OUTPATIENT)
Dept: NEUROLOGY | Facility: CLINIC | Age: 61
End: 2021-03-24
Payer: MEDICARE

## 2021-03-24 VITALS
HEIGHT: 66.5 IN | SYSTOLIC BLOOD PRESSURE: 139 MMHG | HEART RATE: 97 BPM | OXYGEN SATURATION: 97 % | BODY MASS INDEX: 22.23 KG/M2 | DIASTOLIC BLOOD PRESSURE: 83 MMHG | WEIGHT: 140 LBS

## 2021-03-24 DIAGNOSIS — Z86.79 PERSONAL HISTORY OF OTHER DISEASES OF THE CIRCULATORY SYSTEM: ICD-10-CM

## 2021-03-24 DIAGNOSIS — Z82.3 FAMILY HISTORY OF STROKE: ICD-10-CM

## 2021-03-24 DIAGNOSIS — Z86.59 PERSONAL HISTORY OF OTHER MENTAL AND BEHAVIORAL DISORDERS: ICD-10-CM

## 2021-03-24 PROCEDURE — 99214 OFFICE O/P EST MOD 30 MIN: CPT

## 2021-03-25 NOTE — END OF VISIT
[Time Spent: ___ minutes] : I have spent [unfilled] minutes of time on the encounter. [FreeTextEntry3] : Patient seen and examined with ELIDIA Samuel and agree with above except as noted.  Patients history, notes, labs, imaging, vitals and meds reviewed personally.  Patient here for follow up of recent admission for stroke.  Etiology of stroke felt to be small vessel disease and risk factor modification started in hospital but still smoking with continued risks\par Discussed with patient in detail about risks and need to begin working on them before another stroke.  Her exam still shows some mild residual weakness\par \par Plan as above

## 2021-03-25 NOTE — PHYSICAL EXAM
[FreeTextEntry1] : Focal neurological exam:\par \par MS: Awake, alert, oriented to person, place, situation. Normal affect. Follows commands. \par \par Language: Speech is clear, fluent with good repetition & comprehension. No dysarthria. \par \par CNs 2 - 12 intact. EOMI no nystagmus, no diplopia. Slight left facial 2/2 delay in smile on left. Full eye closure strength b/l. Hearing grossly normal. Head turning & shoulder shrug intact b/l. Tongue midline, normal movements, no atrophy.\par \par Motor: Normal muscle bulk & tone. Muscle strength of LUE 4+/5, RUE 5/5, and b/l LE 5/5. \par \par Sensation: Intact to LT, vibration b/l throughout\par \par Cortical: No extinction\par \par Coordination: Intact rapid-alternating movements. No dysmetria to FTN.\par \par Gait: No postural instability. Normal stance and tandem gait.\par \par General: Appears disheveled, alert and oriented to person, place, and situation. In no acute distress. Cooperative. Follows commands. \par Eyes: Sclera and conjunctiva were normal and pupils were equal in size, round, reactive to light, with normal accommodation\par ENT: Ears and nose normal in appearance. \par Vascular: No peripheral edema.\par Respiratory: No visible respiratory distress. \par Musculoskeletal: No involuntary movements were seen.\par Skin: Normal skin color and pigmentation.\par \par \par \par Item score\par 1a. Level of Consciousness    0\par 1b. LOC Questions                  0\par 1c. LOC Commands                 0\par 2. Best Gaze                            0\par 3. Visual                                   0\par 4. Facial Palsy                          0\par 5a. Motor Arm - Left                 1\par 5b. Motor Arm - Right               0\par 6a. Motor Leg - Left                  0\par 6b. Motor Leg - Right                0\par 7. Limb Ataxia                           0\par 8. Sensory                                0\par 9. Language                              0\par 10. Dysarthria                           0\par 11. Extinction and Inattention    0\par -------------------------------------------------------\par Total: 1\par \par \par \par

## 2021-03-25 NOTE — DISCUSSION/SUMMARY
[Antithrombotic therapy with ___] : antithrombotic therapy with  [unfilled] [Intensive Blood Pressure Control] : intensive blood pressure control [Lipid Lowering Therapy] : lipid lowering therapy [Patient encouraged to discuss with Primary MD] : I encouraged the patient to discuss these important issues with ~his/her~ primary care doctor [Goals and Counseling] : I have reviewed the goals of stroke risk factor modification. I counseled the patient on measures to reduce stroke risk, including the importance of medication compliance, risk factor control, exercise, healthy diet and avoidance of smoking. I reviewed stroke warning signs and symptoms and appropriate actions to take if such occur. [FreeTextEntry1] : Patient is a 59 yo RH Female with PMHx of DM, current smoker, schizophrenia on Prolixin injections who presents to clinic for hospital follow up of right thalamic ischemic stroke.  In the hospital, she had NIHSS 5 for minor facial paralysis, LUE drift, LLE drift, ataxia, and mild to moderate sensory loss. She had elevated LDL to 175 and was hypertensive .  Today, patient's BP is WNL, she had mild residual left facial and LUE weakness 4+/5. There is mild stenosis at the origin of the right internal carotid artery with poststenotic dilatation. There is mild stenosis of the proximal internal carotid artery. Her stroke was likely due to arterial atherosclerotic disease. She is positive for all the risk factors for stroke and requires aggressive risk factor modification. \par \par PLAN:\par -Referral for PT and OT\par -Referral for Visiting Nurse services\par -C/w ASA 81 QD\par -C/w Atorvastatin 80 QD for 2 more months, then repeat lipid panel\par -Advised smoking cessation\par -F/u in clinic in 3-4 months \par \par Aggressive risk factor modification should be continued for secondary stroke prevention, consisting of intensive blood pressure control and cholesterol monitoring. I encouraged the patient to discuss these important issues with her primary care doctor.  \par \par I have reviewed the goals of stroke risk factor modification. I counseled the patient on measures to reduce stroke risk, including the importance of medication compliance, risk factor control, exercise, healthy diet and avoidance of smoking. I reviewed stroke warning signs and symptoms and appropriate actions to take if such occur.\par

## 2021-03-25 NOTE — DATA REVIEWED
[de-identified] :   \par MRI 3/8/21: \par -Small focus of acute ischemic change in the lateral aspect of the right thalamus.

## 2021-03-25 NOTE — HISTORY OF PRESENT ILLNESS
[FreeTextEntry1] : Patient is a 59 yo RH Female with PMHx of DM, current smoker, schizophrenia on Prolixin injections who presents to clinic for hospital follow up of right thalamic ischemic stroke.  She presented with Left-sided weakness and sensory loss that started an hour prior to arrival to the ED on 3/7/21.  Patient stated that she was baseline and walked normally at 5pm and then at 6pm she tried to get out of the chair and then fell due to weakness in her left leg. LKW was 5pm 3/7/21. Patient denied speech/visual deficits, n/v dizziness, headache, LOC or head trauma. BP on arrival was 224/97 and . NIHSS was 5 on arrival for minor facial paralysis, LUE drift, LLE drift, ataxia, and mild to moderate sensory loss. TPA was administered. TTE negative. Lab work 3/7/21:  / Total Cholesterol 317 / HgbA1c 6.4 / HDL 29. She was discharged on ASA 81 QD and Atorvastatin 80 QD, mRankin of 1.\par \par Today, patient presents to the clinic with c/o intermittent weakness.  She states that she has a brother who had a stroke. BP today is 139/83. \par   \par MRI 3/8/21: \par -Small focus of acute ischemic change in the lateral aspect of the right thalamus. \par \par CTA Head/Neck 3/7/21: \par -There is mild stenosis at the origin of the right internal carotid artery with poststenotic dilatation. There is mild stenosis of the proximal internal carotid artery.\par -Normal CT angiogram of the head and neck. \par -Using Tmax >6 seconds there is decreased perfusion of the bilateral temporal lobes, likely artifactual in nature given location and distribution. \par -Otherwise no evidence of acute ischemia. \par \par

## 2021-07-29 ENCOUNTER — APPOINTMENT (OUTPATIENT)
Dept: NEUROLOGY | Facility: CLINIC | Age: 61
End: 2021-07-29

## 2021-11-15 ENCOUNTER — APPOINTMENT (OUTPATIENT)
Dept: NEUROLOGY | Facility: CLINIC | Age: 61
End: 2021-11-15

## 2023-05-26 NOTE — DISCHARGE NOTE NURSING/CASE MANAGEMENT/SOCIAL WORK - NSPROEXTENSIONSOFSELF_GEN_A_NUR
Check labs    Colonoscopy:  Up to date    Covid vaccine:   Up to date    Tdap:  Up to date    Shingles shot:  Will get in the future     none

## 2023-12-03 ENCOUNTER — INPATIENT (INPATIENT)
Facility: HOSPITAL | Age: 63
LOS: 3 days | Discharge: HOME HEALTH | DRG: 243 | End: 2023-12-07
Attending: INTERNAL MEDICINE | Admitting: INTERNAL MEDICINE
Payer: MEDICARE

## 2023-12-03 VITALS
DIASTOLIC BLOOD PRESSURE: 95 MMHG | RESPIRATION RATE: 19 BRPM | OXYGEN SATURATION: 95 % | HEIGHT: 66 IN | HEART RATE: 35 BPM | TEMPERATURE: 99 F | WEIGHT: 134.92 LBS | SYSTOLIC BLOOD PRESSURE: 219 MMHG

## 2023-12-03 DIAGNOSIS — I44.2 ATRIOVENTRICULAR BLOCK, COMPLETE: ICD-10-CM

## 2023-12-03 DIAGNOSIS — Z90.710 ACQUIRED ABSENCE OF BOTH CERVIX AND UTERUS: Chronic | ICD-10-CM

## 2023-12-03 LAB
ALBUMIN SERPL ELPH-MCNC: 3.8 G/DL — SIGNIFICANT CHANGE UP (ref 3.5–5.2)
ALBUMIN SERPL ELPH-MCNC: 3.8 G/DL — SIGNIFICANT CHANGE UP (ref 3.5–5.2)
ALP SERPL-CCNC: 89 U/L — SIGNIFICANT CHANGE UP (ref 30–115)
ALP SERPL-CCNC: 89 U/L — SIGNIFICANT CHANGE UP (ref 30–115)
ALT FLD-CCNC: 18 U/L — SIGNIFICANT CHANGE UP (ref 0–41)
ALT FLD-CCNC: 18 U/L — SIGNIFICANT CHANGE UP (ref 0–41)
ANION GAP SERPL CALC-SCNC: 12 MMOL/L — SIGNIFICANT CHANGE UP (ref 7–14)
ANION GAP SERPL CALC-SCNC: 12 MMOL/L — SIGNIFICANT CHANGE UP (ref 7–14)
AST SERPL-CCNC: 28 U/L — SIGNIFICANT CHANGE UP (ref 0–41)
AST SERPL-CCNC: 28 U/L — SIGNIFICANT CHANGE UP (ref 0–41)
BASE EXCESS BLDV CALC-SCNC: -2.3 MMOL/L — LOW (ref -2–3)
BASE EXCESS BLDV CALC-SCNC: -2.3 MMOL/L — LOW (ref -2–3)
BASOPHILS # BLD AUTO: 0.07 K/UL — SIGNIFICANT CHANGE UP (ref 0–0.2)
BASOPHILS # BLD AUTO: 0.07 K/UL — SIGNIFICANT CHANGE UP (ref 0–0.2)
BASOPHILS NFR BLD AUTO: 0.7 % — SIGNIFICANT CHANGE UP (ref 0–1)
BASOPHILS NFR BLD AUTO: 0.7 % — SIGNIFICANT CHANGE UP (ref 0–1)
BILIRUB SERPL-MCNC: 0.4 MG/DL — SIGNIFICANT CHANGE UP (ref 0.2–1.2)
BILIRUB SERPL-MCNC: 0.4 MG/DL — SIGNIFICANT CHANGE UP (ref 0.2–1.2)
BUN SERPL-MCNC: 23 MG/DL — HIGH (ref 10–20)
BUN SERPL-MCNC: 23 MG/DL — HIGH (ref 10–20)
CA-I SERPL-SCNC: 1.13 MMOL/L — LOW (ref 1.15–1.33)
CA-I SERPL-SCNC: 1.13 MMOL/L — LOW (ref 1.15–1.33)
CALCIUM SERPL-MCNC: 9.1 MG/DL — SIGNIFICANT CHANGE UP (ref 8.4–10.5)
CALCIUM SERPL-MCNC: 9.1 MG/DL — SIGNIFICANT CHANGE UP (ref 8.4–10.5)
CHLORIDE SERPL-SCNC: 104 MMOL/L — SIGNIFICANT CHANGE UP (ref 98–110)
CHLORIDE SERPL-SCNC: 104 MMOL/L — SIGNIFICANT CHANGE UP (ref 98–110)
CO2 SERPL-SCNC: 22 MMOL/L — SIGNIFICANT CHANGE UP (ref 17–32)
CO2 SERPL-SCNC: 22 MMOL/L — SIGNIFICANT CHANGE UP (ref 17–32)
CREAT SERPL-MCNC: 1.1 MG/DL — SIGNIFICANT CHANGE UP (ref 0.7–1.5)
CREAT SERPL-MCNC: 1.1 MG/DL — SIGNIFICANT CHANGE UP (ref 0.7–1.5)
EGFR: 57 ML/MIN/1.73M2 — LOW
EGFR: 57 ML/MIN/1.73M2 — LOW
EOSINOPHIL # BLD AUTO: 0.17 K/UL — SIGNIFICANT CHANGE UP (ref 0–0.7)
EOSINOPHIL # BLD AUTO: 0.17 K/UL — SIGNIFICANT CHANGE UP (ref 0–0.7)
EOSINOPHIL NFR BLD AUTO: 1.6 % — SIGNIFICANT CHANGE UP (ref 0–8)
EOSINOPHIL NFR BLD AUTO: 1.6 % — SIGNIFICANT CHANGE UP (ref 0–8)
FLUAV AG NPH QL: SIGNIFICANT CHANGE UP
FLUAV AG NPH QL: SIGNIFICANT CHANGE UP
FLUBV AG NPH QL: SIGNIFICANT CHANGE UP
FLUBV AG NPH QL: SIGNIFICANT CHANGE UP
GAS PNL BLDV: 136 MMOL/L — SIGNIFICANT CHANGE UP (ref 136–145)
GAS PNL BLDV: 136 MMOL/L — SIGNIFICANT CHANGE UP (ref 136–145)
GAS PNL BLDV: SIGNIFICANT CHANGE UP
GLUCOSE BLDC GLUCOMTR-MCNC: 134 MG/DL — HIGH (ref 70–99)
GLUCOSE BLDC GLUCOMTR-MCNC: 134 MG/DL — HIGH (ref 70–99)
GLUCOSE BLDC GLUCOMTR-MCNC: 145 MG/DL — HIGH (ref 70–99)
GLUCOSE BLDC GLUCOMTR-MCNC: 145 MG/DL — HIGH (ref 70–99)
GLUCOSE SERPL-MCNC: 210 MG/DL — HIGH (ref 70–99)
GLUCOSE SERPL-MCNC: 210 MG/DL — HIGH (ref 70–99)
HCO3 BLDV-SCNC: 24 MMOL/L — SIGNIFICANT CHANGE UP (ref 22–29)
HCO3 BLDV-SCNC: 24 MMOL/L — SIGNIFICANT CHANGE UP (ref 22–29)
HCT VFR BLD CALC: 43.1 % — SIGNIFICANT CHANGE UP (ref 37–47)
HCT VFR BLD CALC: 43.1 % — SIGNIFICANT CHANGE UP (ref 37–47)
HCT VFR BLDA CALC: 43 % — SIGNIFICANT CHANGE UP (ref 39–51)
HCT VFR BLDA CALC: 43 % — SIGNIFICANT CHANGE UP (ref 39–51)
HGB BLD CALC-MCNC: 14.3 G/DL — SIGNIFICANT CHANGE UP (ref 12.6–17.4)
HGB BLD CALC-MCNC: 14.3 G/DL — SIGNIFICANT CHANGE UP (ref 12.6–17.4)
HGB BLD-MCNC: 14.1 G/DL — SIGNIFICANT CHANGE UP (ref 12–16)
HGB BLD-MCNC: 14.1 G/DL — SIGNIFICANT CHANGE UP (ref 12–16)
IMM GRANULOCYTES NFR BLD AUTO: 0.4 % — HIGH (ref 0.1–0.3)
IMM GRANULOCYTES NFR BLD AUTO: 0.4 % — HIGH (ref 0.1–0.3)
LACTATE BLDV-MCNC: 3.1 MMOL/L — HIGH (ref 0.5–2)
LACTATE BLDV-MCNC: 3.1 MMOL/L — HIGH (ref 0.5–2)
LACTATE SERPL-SCNC: 2.2 MMOL/L — HIGH (ref 0.7–2)
LACTATE SERPL-SCNC: 2.2 MMOL/L — HIGH (ref 0.7–2)
LYMPHOCYTES # BLD AUTO: 2.52 K/UL — SIGNIFICANT CHANGE UP (ref 1.2–3.4)
LYMPHOCYTES # BLD AUTO: 2.52 K/UL — SIGNIFICANT CHANGE UP (ref 1.2–3.4)
LYMPHOCYTES # BLD AUTO: 23.7 % — SIGNIFICANT CHANGE UP (ref 20.5–51.1)
LYMPHOCYTES # BLD AUTO: 23.7 % — SIGNIFICANT CHANGE UP (ref 20.5–51.1)
MAGNESIUM SERPL-MCNC: 2.5 MG/DL — HIGH (ref 1.8–2.4)
MAGNESIUM SERPL-MCNC: 2.5 MG/DL — HIGH (ref 1.8–2.4)
MCHC RBC-ENTMCNC: 31.9 PG — HIGH (ref 27–31)
MCHC RBC-ENTMCNC: 31.9 PG — HIGH (ref 27–31)
MCHC RBC-ENTMCNC: 32.7 G/DL — SIGNIFICANT CHANGE UP (ref 32–37)
MCHC RBC-ENTMCNC: 32.7 G/DL — SIGNIFICANT CHANGE UP (ref 32–37)
MCV RBC AUTO: 97.5 FL — SIGNIFICANT CHANGE UP (ref 81–99)
MCV RBC AUTO: 97.5 FL — SIGNIFICANT CHANGE UP (ref 81–99)
MONOCYTES # BLD AUTO: 0.75 K/UL — HIGH (ref 0.1–0.6)
MONOCYTES # BLD AUTO: 0.75 K/UL — HIGH (ref 0.1–0.6)
MONOCYTES NFR BLD AUTO: 7 % — SIGNIFICANT CHANGE UP (ref 1.7–9.3)
MONOCYTES NFR BLD AUTO: 7 % — SIGNIFICANT CHANGE UP (ref 1.7–9.3)
NEUTROPHILS # BLD AUTO: 7.09 K/UL — HIGH (ref 1.4–6.5)
NEUTROPHILS # BLD AUTO: 7.09 K/UL — HIGH (ref 1.4–6.5)
NEUTROPHILS NFR BLD AUTO: 66.6 % — SIGNIFICANT CHANGE UP (ref 42.2–75.2)
NEUTROPHILS NFR BLD AUTO: 66.6 % — SIGNIFICANT CHANGE UP (ref 42.2–75.2)
NRBC # BLD: 0 /100 WBCS — SIGNIFICANT CHANGE UP (ref 0–0)
NRBC # BLD: 0 /100 WBCS — SIGNIFICANT CHANGE UP (ref 0–0)
NT-PROBNP SERPL-SCNC: HIGH PG/ML (ref 0–300)
NT-PROBNP SERPL-SCNC: HIGH PG/ML (ref 0–300)
PCO2 BLDV: 47 MMHG — HIGH (ref 39–42)
PCO2 BLDV: 47 MMHG — HIGH (ref 39–42)
PH BLDV: 7.32 — SIGNIFICANT CHANGE UP (ref 7.32–7.43)
PH BLDV: 7.32 — SIGNIFICANT CHANGE UP (ref 7.32–7.43)
PLATELET # BLD AUTO: 284 K/UL — SIGNIFICANT CHANGE UP (ref 130–400)
PLATELET # BLD AUTO: 284 K/UL — SIGNIFICANT CHANGE UP (ref 130–400)
PMV BLD: 10.7 FL — HIGH (ref 7.4–10.4)
PMV BLD: 10.7 FL — HIGH (ref 7.4–10.4)
PO2 BLDV: 28 MMHG — SIGNIFICANT CHANGE UP
PO2 BLDV: 28 MMHG — SIGNIFICANT CHANGE UP
POTASSIUM BLDV-SCNC: 5 MMOL/L — SIGNIFICANT CHANGE UP (ref 3.5–5.1)
POTASSIUM BLDV-SCNC: 5 MMOL/L — SIGNIFICANT CHANGE UP (ref 3.5–5.1)
POTASSIUM SERPL-MCNC: 5.9 MMOL/L — HIGH (ref 3.5–5)
POTASSIUM SERPL-MCNC: 5.9 MMOL/L — HIGH (ref 3.5–5)
POTASSIUM SERPL-SCNC: 5.9 MMOL/L — HIGH (ref 3.5–5)
POTASSIUM SERPL-SCNC: 5.9 MMOL/L — HIGH (ref 3.5–5)
PROT SERPL-MCNC: 7.2 G/DL — SIGNIFICANT CHANGE UP (ref 6–8)
PROT SERPL-MCNC: 7.2 G/DL — SIGNIFICANT CHANGE UP (ref 6–8)
RBC # BLD: 4.42 M/UL — SIGNIFICANT CHANGE UP (ref 4.2–5.4)
RBC # BLD: 4.42 M/UL — SIGNIFICANT CHANGE UP (ref 4.2–5.4)
RBC # FLD: 13.3 % — SIGNIFICANT CHANGE UP (ref 11.5–14.5)
RBC # FLD: 13.3 % — SIGNIFICANT CHANGE UP (ref 11.5–14.5)
RSV RNA NPH QL NAA+NON-PROBE: SIGNIFICANT CHANGE UP
RSV RNA NPH QL NAA+NON-PROBE: SIGNIFICANT CHANGE UP
SAO2 % BLDV: 36.8 % — SIGNIFICANT CHANGE UP
SAO2 % BLDV: 36.8 % — SIGNIFICANT CHANGE UP
SARS-COV-2 RNA SPEC QL NAA+PROBE: SIGNIFICANT CHANGE UP
SARS-COV-2 RNA SPEC QL NAA+PROBE: SIGNIFICANT CHANGE UP
SODIUM SERPL-SCNC: 138 MMOL/L — SIGNIFICANT CHANGE UP (ref 135–146)
SODIUM SERPL-SCNC: 138 MMOL/L — SIGNIFICANT CHANGE UP (ref 135–146)
TROPONIN T SERPL-MCNC: 0.03 NG/ML — CRITICAL HIGH
TROPONIN T SERPL-MCNC: 0.03 NG/ML — CRITICAL HIGH
TROPONIN T SERPL-MCNC: <0.01 NG/ML — SIGNIFICANT CHANGE UP
TROPONIN T SERPL-MCNC: <0.01 NG/ML — SIGNIFICANT CHANGE UP
WBC # BLD: 10.64 K/UL — SIGNIFICANT CHANGE UP (ref 4.8–10.8)
WBC # BLD: 10.64 K/UL — SIGNIFICANT CHANGE UP (ref 4.8–10.8)
WBC # FLD AUTO: 10.64 K/UL — SIGNIFICANT CHANGE UP (ref 4.8–10.8)
WBC # FLD AUTO: 10.64 K/UL — SIGNIFICANT CHANGE UP (ref 4.8–10.8)

## 2023-12-03 PROCEDURE — 93005 ELECTROCARDIOGRAM TRACING: CPT

## 2023-12-03 PROCEDURE — 36415 COLL VENOUS BLD VENIPUNCTURE: CPT

## 2023-12-03 PROCEDURE — C1898: CPT

## 2023-12-03 PROCEDURE — 80061 LIPID PANEL: CPT

## 2023-12-03 PROCEDURE — 99222 1ST HOSP IP/OBS MODERATE 55: CPT

## 2023-12-03 PROCEDURE — 85014 HEMATOCRIT: CPT

## 2023-12-03 PROCEDURE — 83036 HEMOGLOBIN GLYCOSYLATED A1C: CPT

## 2023-12-03 PROCEDURE — 82803 BLOOD GASES ANY COMBINATION: CPT

## 2023-12-03 PROCEDURE — 82962 GLUCOSE BLOOD TEST: CPT

## 2023-12-03 PROCEDURE — 84100 ASSAY OF PHOSPHORUS: CPT

## 2023-12-03 PROCEDURE — 86850 RBC ANTIBODY SCREEN: CPT

## 2023-12-03 PROCEDURE — 93280 PM DEVICE PROGR EVAL DUAL: CPT

## 2023-12-03 PROCEDURE — 80053 COMPREHEN METABOLIC PANEL: CPT

## 2023-12-03 PROCEDURE — C1889: CPT

## 2023-12-03 PROCEDURE — 71045 X-RAY EXAM CHEST 1 VIEW: CPT

## 2023-12-03 PROCEDURE — 99291 CRITICAL CARE FIRST HOUR: CPT | Mod: GC

## 2023-12-03 PROCEDURE — C1892: CPT

## 2023-12-03 PROCEDURE — 80048 BASIC METABOLIC PNL TOTAL CA: CPT

## 2023-12-03 PROCEDURE — 83605 ASSAY OF LACTIC ACID: CPT

## 2023-12-03 PROCEDURE — 83735 ASSAY OF MAGNESIUM: CPT

## 2023-12-03 PROCEDURE — 85018 HEMOGLOBIN: CPT

## 2023-12-03 PROCEDURE — 86901 BLOOD TYPING SEROLOGIC RH(D): CPT

## 2023-12-03 PROCEDURE — 85730 THROMBOPLASTIN TIME PARTIAL: CPT

## 2023-12-03 PROCEDURE — 85610 PROTHROMBIN TIME: CPT

## 2023-12-03 PROCEDURE — 84132 ASSAY OF SERUM POTASSIUM: CPT

## 2023-12-03 PROCEDURE — 84295 ASSAY OF SERUM SODIUM: CPT

## 2023-12-03 PROCEDURE — C1769: CPT

## 2023-12-03 PROCEDURE — 33208 INSRT HEART PM ATRIAL & VENT: CPT | Mod: KX

## 2023-12-03 PROCEDURE — 71046 X-RAY EXAM CHEST 2 VIEWS: CPT

## 2023-12-03 PROCEDURE — 86618 LYME DISEASE ANTIBODY: CPT

## 2023-12-03 PROCEDURE — 86900 BLOOD TYPING SEROLOGIC ABO: CPT

## 2023-12-03 PROCEDURE — 84484 ASSAY OF TROPONIN QUANT: CPT

## 2023-12-03 PROCEDURE — 84443 ASSAY THYROID STIM HORMONE: CPT

## 2023-12-03 PROCEDURE — 85025 COMPLETE CBC W/AUTO DIFF WBC: CPT

## 2023-12-03 PROCEDURE — 82330 ASSAY OF CALCIUM: CPT

## 2023-12-03 PROCEDURE — C1785: CPT

## 2023-12-03 PROCEDURE — 75557 CARDIAC MRI FOR MORPH: CPT

## 2023-12-03 PROCEDURE — 93010 ELECTROCARDIOGRAM REPORT: CPT

## 2023-12-03 PROCEDURE — 93306 TTE W/DOPPLER COMPLETE: CPT

## 2023-12-03 PROCEDURE — 86780 TREPONEMA PALLIDUM: CPT

## 2023-12-03 PROCEDURE — 71045 X-RAY EXAM CHEST 1 VIEW: CPT | Mod: 26

## 2023-12-03 PROCEDURE — 81001 URINALYSIS AUTO W/SCOPE: CPT

## 2023-12-03 PROCEDURE — 94760 N-INVAS EAR/PLS OXIMETRY 1: CPT

## 2023-12-03 RX ORDER — FUROSEMIDE 40 MG
40 TABLET ORAL
Refills: 0 | Status: DISCONTINUED | OUTPATIENT
Start: 2023-12-03 | End: 2023-12-04

## 2023-12-03 RX ORDER — LANOLIN ALCOHOL/MO/W.PET/CERES
3 CREAM (GRAM) TOPICAL AT BEDTIME
Refills: 0 | Status: DISCONTINUED | OUTPATIENT
Start: 2023-12-03 | End: 2023-12-07

## 2023-12-03 RX ORDER — ACETAMINOPHEN 500 MG
650 TABLET ORAL EVERY 6 HOURS
Refills: 0 | Status: DISCONTINUED | OUTPATIENT
Start: 2023-12-03 | End: 2023-12-07

## 2023-12-03 RX ORDER — ONDANSETRON 8 MG/1
4 TABLET, FILM COATED ORAL EVERY 8 HOURS
Refills: 0 | Status: DISCONTINUED | OUTPATIENT
Start: 2023-12-03 | End: 2023-12-07

## 2023-12-03 RX ORDER — AMLODIPINE BESYLATE 2.5 MG/1
5 TABLET ORAL DAILY
Refills: 0 | Status: DISCONTINUED | OUTPATIENT
Start: 2023-12-03 | End: 2023-12-04

## 2023-12-03 RX ORDER — ENOXAPARIN SODIUM 100 MG/ML
40 INJECTION SUBCUTANEOUS EVERY 24 HOURS
Refills: 0 | Status: DISCONTINUED | OUTPATIENT
Start: 2023-12-03 | End: 2023-12-05

## 2023-12-03 RX ADMIN — AMLODIPINE BESYLATE 5 MILLIGRAM(S): 2.5 TABLET ORAL at 17:31

## 2023-12-03 RX ADMIN — Medication 40 MILLIGRAM(S): at 17:31

## 2023-12-03 NOTE — H&P ADULT - NSHPPHYSICALEXAM_GEN_ALL_CORE
T(C): 37.2 (12-03-23 @ 08:43), Max: 37.2 (12-03-23 @ 08:43)  HR: 35 (12-03-23 @ 08:43) (35 - 35)  BP: 219/95 (12-03-23 @ 08:43) (219/95 - 219/95)  RR: 19 (12-03-23 @ 08:43) (19 - 19)  SpO2: 95% (12-03-23 @ 08:43) (95% - 95%)    CONSTITUTIONAL: Disheveled, no apparent distress    EYES: PERRLA and symmetric, EOMI, No conjunctival or scleral injection, non-icteric    ENMT:  No external nasal lesions; nasal mucosa not inflamed; poor oral hygiene   	  NECK: Supple, symmetric and without tracheal deviation; thyroid gland not enlarged and without palpable masses    RESPIRATORY: No respiratory distress, no use of accessory muscles; CTA b/l, no wheezes, rales or rhonchi    CARDIOVASCULAR: Irregular rate (bradycardia ), +S1S2, no murmurs; no JVD; no peripheral edema    GASTROINTESTINAL: Soft, non tender, non distended, no rebound, no guarding; No palpable masses    MUSCULOSKELETAL:  no digital clubbing or cyanosis; examination of the (head/neck, spine/ribs/pelvis, RUE, LUE, RLE, LLE) without misalignment    SKIN: No rashes or ulcers noted; no subcutaneous nodules or induration palpable    NEUROLOGIC: No focal deficit noted     PSYCHIATRIC: Appropriate insight/judgment; A+O x 3, mood and affect appropriate, recent/remote memory intact T(C): 37.2 (12-03-23 @ 08:43), Max: 37.2 (12-03-23 @ 08:43)  HR: 35 (12-03-23 @ 08:43) (35 - 35)  BP: 219/95 (12-03-23 @ 08:43) (219/95 - 219/95)  RR: 19 (12-03-23 @ 08:43) (19 - 19)  SpO2: 95% (12-03-23 @ 08:43) (95% - 95%)    CONSTITUTIONAL: Disheveled, no apparent distress    EYES: PERRLA and symmetric, EOMI, No conjunctival or scleral injection, non-icteric    ENMT:  No external nasal lesions; nasal mucosa not inflamed; poor oral hygiene   	  NECK: Supple, symmetric and without tracheal deviation; thyroid gland not enlarged and without palpable masses    RESPIRATORY: No respiratory distress, no use of accessory muscles; CTA b/l, no wheezes,  +Inspiratory crackles in the L lung > R lung     CARDIOVASCULAR: Irregular rate (bradycardia ), +S1S2, no murmurs; no JVD; no peripheral edema    GASTROINTESTINAL: Soft, non tender, non distended, no rebound, no guarding; No palpable masses    MUSCULOSKELETAL:  no digital clubbing or cyanosis; examination of the (head/neck, spine/ribs/pelvis, RUE, LUE, RLE, LLE) without misalignment    SKIN: No rashes or ulcers noted; no subcutaneous nodules or induration palpable    NEUROLOGIC: No focal deficit noted     PSYCHIATRIC: Appropriate insight/judgment; A+O x 3, mood and affect appropriate, recent/remote memory intact

## 2023-12-03 NOTE — ED PROVIDER NOTE - ATTENDING CONTRIBUTION TO CARE
I have personally performed a history and physical exam on this patient and personally directed the management of the patient.    CRITICAL CARE TIME including serial EKG, transcutaneous pacing pad placement, discussion with cardiology consultants and CCU team.

## 2023-12-03 NOTE — PATIENT PROFILE ADULT - FALL HARM RISK - HARM RISK INTERVENTIONS
Communicate Risk of Fall with Harm to all staff/Reinforce activity limits and safety measures with patient and family/Tailored Fall Risk Interventions/Visual Cue: Yellow wristband and red socks/Bed in lowest position, wheels locked, appropriate side rails in place/Call bell, personal items and telephone in reach/Instruct patient to call for assistance before getting out of bed or chair/Non-slip footwear when patient is out of bed/[place] to call system/Physically safe environment - no spills, clutter or unnecessary equipment/Purposeful Proactive Rounding/Room/bathroom lighting operational, light cord in reach Communicate Risk of Fall with Harm to all staff/Reinforce activity limits and safety measures with patient and family/Tailored Fall Risk Interventions/Visual Cue: Yellow wristband and red socks/Bed in lowest position, wheels locked, appropriate side rails in place/Call bell, personal items and telephone in reach/Instruct patient to call for assistance before getting out of bed or chair/Non-slip footwear when patient is out of bed/[place] to call system/Physically safe environment - no spills, clutter or unnecessary equipment/Purposeful Proactive Rounding/Room/bathroom lighting operational, light cord in reach

## 2023-12-03 NOTE — H&P ADULT - NSHPREVIEWOFSYSTEMS_GEN_ALL_CORE
CONSTITUTIONAL: Per HPI   EYES/ENT: No visual changes;  No vertigo or throat pain   NECK: No pain or stiffness  RESPIRATORY: + Dry cough   CARDIOVASCULAR: No chest pain or palpitations  GASTROINTESTINAL: No abdominal or epigastric pain. No nausea, vomiting, or hematemesis; No diarrhea or constipation. No melena or hematochezia.  GENITOURINARY: No dysuria, frequency or hematuria  NEUROLOGICAL: No numbness or weakness  SKIN: No itching, rashes

## 2023-12-03 NOTE — H&P ADULT - NSHPLABSRESULTS_GEN_ALL_CORE
14.1   10.64 )-----------( 284      ( 03 Dec 2023 09:25 )             43.1     12-03    138  |  104  |  23<H>  ----------------------------<  210<H>  5.9<H>   |  22  |  1.1    Ca    9.1      03 Dec 2023 09:25  Mg     2.5     12-03    TPro  7.2  /  Alb  3.8  /  TBili  0.4  /  DBili  x   /  AST  28  /  ALT  18  /  AlkPhos  89  12-03        CARDIAC MARKERS ( 03 Dec 2023 09:25 )  x     / <0.01 ng/mL / x     / x     / x            LIVER FUNCTIONS - ( 03 Dec 2023 09:25 )  Alb: 3.8 g/dL / Pro: 7.2 g/dL / ALK PHOS: 89 U/L / ALT: 18 U/L / AST: 28 U/L / GGT: x                 Urinalysis Basic - ( 03 Dec 2023 09:25 )    Color: x / Appearance: x / SG: x / pH: x  Gluc: 210 mg/dL / Ketone: x  / Bili: x / Urobili: x   Blood: x / Protein: x / Nitrite: x   Leuk Esterase: x / RBC: x / WBC x   Sq Epi: x / Non Sq Epi: x / Bacteria: x        RADIOLOGY & ADDITIONAL STUDIES:     < from: Xray Chest 1 View- PORTABLE-Urgent (12.03.23 @ 09:30) >    Impression:    No radiographic evidence of acute cardiopulmonary disease.        --- End of Report ---          < end of copied text >

## 2023-12-03 NOTE — ED PROVIDER NOTE - CLINICAL SUMMARY MEDICAL DECISION MAKING FREE TEXT BOX
62y F hx of manic depressive disorder, type II diabetes not on any treatment, active smoker presenting for evaluation of shortness of breath and fevers progressive over the past 2 days. Per EMS vitals were normal and patient was placed on 2L NC for comfort. Patient arrived to the ED bradycardic with HR of 35 and  hypertensive. EKG showed complete heart block. Patient denies any dizziness. Patient endorses having constant  midsternal non-radiating chest pressure  8/10 in intensity since last 2 days. Patient denies any alleviating or exaggerating factors. Transcutaneous pacer pads were placed in the ED. HR 35.  Troponin negative, BNP 00880. VBG: lactate 3.1, pH 7.23, PCO2 47, CXR was unremarkable for acute cardiopulmonary findings.   Last TTE in 2021:  EF of 69 %.  Increased LV wall thickness. Grade I diastolic dysfunction.  Mild mitral annular calcification. Mild thickening of the anterior and posterior mitral valve leaflets. Mild tricuspid regurgitation. Patient is being admitted to CCU for further evaluation and management. Transcutaneous pacer pads placed. 62y F hx of manic depressive disorder, type II diabetes not on any treatment, active smoker presenting for evaluation of shortness of breath and fevers progressive over the past 2 days. Per EMS vitals were normal and patient was placed on 2L NC for comfort. Patient arrived to the ED bradycardic with HR of 35 and  hypertensive. EKG showed complete heart block. Patient denies any dizziness. Patient endorses having constant  midsternal non-radiating chest pressure  8/10 in intensity since last 2 days. Patient denies any alleviating or exaggerating factors. Transcutaneous pacer pads were placed in the ED. HR 35.  Troponin negative, BNP 18773. VBG: lactate 3.1, pH 7.23, PCO2 47, CXR was unremarkable for acute cardiopulmonary findings.   Last TTE in 2021:  EF of 69 %.  Increased LV wall thickness. Grade I diastolic dysfunction.  Mild mitral annular calcification. Mild thickening of the anterior and posterior mitral valve leaflets. Mild tricuspid regurgitation. Patient is being admitted to CCU for further evaluation and management. Transcutaneous pacer pads placed. 62y F hx of manic depressive disorder, type II diabetes not on any treatment, active smoker presenting for evaluation of shortness of breath and fevers progressive over the past 2 days. Per EMS vitals were normal and patient was placed on 2L NC for comfort. Patient arrived to the ED bradycardic with HR of 35 and  hypertensive. EKG showed complete heart block. Patient denies any dizziness. Patient endorses having constant  midsternal non-radiating chest pressure  8/10 in intensity since last 2 days. Patient denies any alleviating or exaggerating factors. Transcutaneous pacer pads were placed in the ED. HR 35.  Troponin negative, BNP 91896. VBG: lactate 3.1, pH 7.23, PCO2 47, CXR was unremarkable for acute cardiopulmonary findings.   Last TTE in 2021:  EF of 69 %.  Increased LV wall thickness. Grade I diastolic dysfunction.  Mild mitral annular calcification. Mild thickening of the anterior and posterior mitral valve leaflets. Mild tricuspid regurgitation. Patient is being admitted to CCU for further evaluation and management. Transcutaneous pacer pads placed.

## 2023-12-03 NOTE — H&P ADULT - NSICDXPASTMEDICALHX_GEN_ALL_CORE_FT
PAST MEDICAL HISTORY:  Bipolar disorder in partial remission, most recent episode unspecified type Last episodes six years ago    Diabetes mellitus

## 2023-12-03 NOTE — H&P ADULT - ASSESSMENT
IMPRESSION:    #Chest tightness   #Dyspnea   #Cough   #Bradycardia secondary to complete heart block   #Active Smoker   #HO Schizophrenia   #HO HTN   #HO Acute R thalamic infarct 2/2 arterial atherosclerosis in 2021  #HO DM       PLAN:     CNS: Avoid CNS depressants.     HEENT:  Oral care.     PULMONARY:  HOB @ 45 degree. RVP negative; f/u procal; c/w 2 L NC     CARDIOVASCULAR: EKG noted; Avoid AV rhoda blocking agents; pBNP noted; Repeat TTE; EP evaluation; Start Lasix IV 40 mg BID;     GI: GI prophylaxis. DASH/TLC; NPO after midnight                          RENAL:  F/u  lytes.  Correct as needed. accurate I/O    INFECTIOUS DISEASE:  CBC daily; Monitor off Antibiotics     HEMATOLOGICAL:  DVT prophylaxis.     ENDOCRINE:  Follow up FS.  Insulin protocol if needed.    DISPOSITION: CCU      IMPRESSION:    #Chest tightness   #Dyspnea   #Cough   #Bradycardia secondary to complete heart block   #Active Smoker   #HO Schizophrenia   #HO HTN   #HO Acute R thalamic infarct 2/2 arterial atherosclerosis in 2021  #HO DM       PLAN:     CNS: Avoid CNS depressants.     HEENT:  Oral care.     PULMONARY:  HOB @ 45 degree. RVP negative; f/u procal; c/w 2 L NC     CARDIOVASCULAR: EKG noted; Avoid AV rhoda blocking agents; pBNP noted; Repeat TTE; EP evaluation; Start Lasix IV 40 mg BID;     GI: GI prophylaxis. DASH/TLC; NPO after midnight                          RENAL:  F/u  lytes.  Correct as needed. accurate I/O    INFECTIOUS DISEASE:  Obtain RPR; Obtain Lyme antibodies     HEMATOLOGICAL:  DVT prophylaxis.     ENDOCRINE:  Follow up FS.  Insulin protocol if needed. Check TSH; Check Hgba1C; Check Lipid profile     DISPOSITION: CCU      IMPRESSION:    #Chest tightness   #Dyspnea   #Cough   #Bradycardia secondary to complete heart block   #HTN Emergency   #Active Smoker   #HO Schizophrenia   #HO HTN   #HO Acute R thalamic infarct 2/2 arterial atherosclerosis in 2021  #HO DM       PLAN:     CNS: Avoid CNS depressants.     HEENT:  Oral care.     PULMONARY:  HOB @ 45 degree. RVP negative; f/u procal; c/w 2 L NC     CARDIOVASCULAR: EKG noted; Avoid AV rhoda blocking agents; pBNP noted; Repeat TTE; EP evaluation; Start Lasix IV 40 mg BID;     GI: GI prophylaxis. DASH/TLC; NPO after midnight                          RENAL:  F/u  lytes.  Correct as needed. accurate I/O    INFECTIOUS DISEASE:  Obtain RPR; Obtain Lyme antibodies     HEMATOLOGICAL:  DVT prophylaxis.     ENDOCRINE:  Follow up FS.  Insulin protocol if needed. Check TSH; Check Hgba1C; Check Lipid profile     DISPOSITION: CCU

## 2023-12-03 NOTE — H&P ADULT - HISTORY OF PRESENT ILLNESS
62y F hx of manic depressive disorder, type II diabetes not on any treatment, active smoker presenting for evaluation of shortness of breath and fevers progressive over the past 2 days. Per EMS vitals were normal and patient was placed on 2L NC for comfort. Patient arrived to the ED bradycardic with HR of 35 and  hypertensive. EKG showed complete heart block. Patient denies any dizziness. Patient endorses having constant  midsternal non-radiating chest pressure  8/10 in intensity since last 2 days. Patient denies any alleviating or exaggerating factors. Transcutaneous pacer pads were placed in the ED.     In ED vitals were  T(F): 98.9  HR: 35  BP: 219/95  RR: 19  SpO2: 95%    Labs were significant for K+ 5.9 (hemolyzed), Bun 23, Cr 1.1 ( baseline 0.7 in 2021), troponin negative, pBNP 30658. VBG: lactate 3.1, pH 7.23, PCO2 47,     CXR was unremarkable for acute cardiopulmonary findings.     Last TTE in 2021:  EF of 69 %.  Increased LV wall thickness. Grade I diastolic dysfunction.  Mild mitral annular calcification. Mild thickening of the anterior and posterior mitral valve leaflets. Mild tricuspid regurgitation.      Patient is being admitted to CCU for further evaluation and management.    62y F hx of manic depressive disorder, type II diabetes not on any treatment, active smoker presenting for evaluation of shortness of breath and fevers progressive over the past 2 days. Per EMS vitals were normal and patient was placed on 2L NC for comfort. Patient arrived to the ED bradycardic with HR of 35 and  hypertensive. EKG showed complete heart block. Patient denies any dizziness. Patient endorses having constant  midsternal non-radiating chest pressure  8/10 in intensity since last 2 days. Patient denies any alleviating or exaggerating factors. Transcutaneous pacer pads were placed in the ED.     In ED vitals were  T(F): 98.9  HR: 35  BP: 219/95  RR: 19  SpO2: 95%    Labs were significant for K+ 5.9 (hemolyzed), Bun 23, Cr 1.1 ( baseline 0.7 in 2021), troponin negative, pBNP 28725. VBG: lactate 3.1, pH 7.23, PCO2 47,     CXR was unremarkable for acute cardiopulmonary findings.     Last TTE in 2021:  EF of 69 %.  Increased LV wall thickness. Grade I diastolic dysfunction.  Mild mitral annular calcification. Mild thickening of the anterior and posterior mitral valve leaflets. Mild tricuspid regurgitation.      Patient is being admitted to CCU for further evaluation and management.    62y F hx of manic depressive disorder, type II diabetes not on any treatment, active smoker presenting for evaluation of shortness of breath and fevers progressive over the past 2 days. Per EMS vitals were normal and patient was placed on 2L NC for comfort. Patient arrived to the ED bradycardic with HR of 35 and  hypertensive. EKG showed complete heart block. Patient denies any dizziness. Patient endorses having constant  midsternal non-radiating chest pressure  8/10 in intensity since last 2 days. Patient denies any alleviating or exaggerating factors. Transcutaneous pacer pads were placed in the ED.     In ED vitals were  T(F): 98.9  HR: 35  BP: 219/95  RR: 19  SpO2: 95%    Labs were significant for K+ 5.9 (hemolyzed), Bun 23, Cr 1.1 ( baseline 0.7 in 2021), troponin negative, pBNP 48426. VBG: lactate 3.1, pH 7.23, PCO2 47,     CXR was unremarkable for acute cardiopulmonary findings.     Last TTE in 2021:  EF of 69 %.  Increased LV wall thickness. Grade I diastolic dysfunction.  Mild mitral annular calcification. Mild thickening of the anterior and posterior mitral valve leaflets. Mild tricuspid regurgitation.      Patient is being admitted to CCU for further evaluation and management.

## 2023-12-03 NOTE — ED PROVIDER NOTE - PHYSICAL EXAMINATION
Vital Signs: I have reviewed the initial vital signs. (+) BRADYCARDIC  Constitutional: chronically ill appearing  HEENT: Airway patent, moist MM,   CV: bradycardia, no murmurs  Lungs: (+) cough  ABD: Non-tender, Non-distended,   MSK: Neck supple, nontender, normal range of motion,   INTEG: Skin warm, dry, no rash.  NEURO: A&Ox3, moving all extremities, normal speech

## 2023-12-03 NOTE — H&P ADULT - ATTENDING COMMENTS
Patient seen, case d/w CCU team on rounds.  Patient with CHB, junctional escape rhythm. Hemodynamically stabl;e.  Uncontrolled HTN.  Will monitor in CCU.  Agree with w/u as above.  Start Amlodipine 5 mg tonight.   2D ECHO.  EP evaluation pending.

## 2023-12-03 NOTE — ED ADULT NURSE NOTE - NSFALLHARMRISKINTERV_ED_ALL_ED
Communicate risk of Fall with Harm to all staff, patient, and family/Provide visual cue: red socks, yellow wristband, yellow gown, etc/Reinforce activity limits and safety measures with patient and family/Bed in lowest position, wheels locked, appropriate side rails in place/Call bell, personal items and telephone in reach/Instruct patient to call for assistance before getting out of bed/chair/stretcher/Non-slip footwear applied when patient is off stretcher/[place] to call system/Physically safe environment - no spills, clutter or unnecessary equipment/Purposeful Proactive Rounding/Room/bathroom lighting operational, light cord in reach Communicate risk of Fall with Harm to all staff, patient, and family/Provide visual cue: red socks, yellow wristband, yellow gown, etc/Reinforce activity limits and safety measures with patient and family/Bed in lowest position, wheels locked, appropriate side rails in place/Call bell, personal items and telephone in reach/Instruct patient to call for assistance before getting out of bed/chair/stretcher/Non-slip footwear applied when patient is off stretcher/[place] to call system/Physically safe environment - no spills, clutter or unnecessary equipment/Purposeful Proactive Rounding/Room/bathroom lighting operational, light cord in reach Communicate risk of Fall with Harm to all staff, patient, and family/Provide visual cue: red socks, yellow wristband, yellow gown, etc/Reinforce activity limits and safety measures with patient and family/Bed in lowest position, wheels locked, appropriate side rails in place/Call bell, personal items and telephone in reach/Instruct patient to call for assistance before getting out of bed/chair/stretcher/Non-slip footwear applied when patient is off stretcher/[place] to call system/Physically safe environment - no spills, clutter or unnecessary equipment/Purposeful Proactive Rounding/Room/bathroom lighting operational, light cord in reach

## 2023-12-03 NOTE — ED PROVIDER NOTE - OBJECTIVE STATEMENT
62y F hx of manic depressive disorder, type II diabetes not on any treatment, active smoker presenting for eval of shortness of breath and fevers progressive over the past 2 days. Per EMS vitals were normal and patient was placed on 2L NC for comfort. Patient arrived to the ED bradycardic, hypertensive. EKG with complete heart block. Pads placed.

## 2023-12-04 LAB
A1C WITH ESTIMATED AVERAGE GLUCOSE RESULT: 6.6 % — HIGH (ref 4–5.6)
A1C WITH ESTIMATED AVERAGE GLUCOSE RESULT: 6.6 % — HIGH (ref 4–5.6)
ALBUMIN SERPL ELPH-MCNC: 3.6 G/DL — SIGNIFICANT CHANGE UP (ref 3.5–5.2)
ALBUMIN SERPL ELPH-MCNC: 3.6 G/DL — SIGNIFICANT CHANGE UP (ref 3.5–5.2)
ALP SERPL-CCNC: 86 U/L — SIGNIFICANT CHANGE UP (ref 30–115)
ALP SERPL-CCNC: 86 U/L — SIGNIFICANT CHANGE UP (ref 30–115)
ALT FLD-CCNC: 16 U/L — SIGNIFICANT CHANGE UP (ref 0–41)
ALT FLD-CCNC: 16 U/L — SIGNIFICANT CHANGE UP (ref 0–41)
ANION GAP SERPL CALC-SCNC: 11 MMOL/L — SIGNIFICANT CHANGE UP (ref 7–14)
ANION GAP SERPL CALC-SCNC: 11 MMOL/L — SIGNIFICANT CHANGE UP (ref 7–14)
ANION GAP SERPL CALC-SCNC: 15 MMOL/L — HIGH (ref 7–14)
ANION GAP SERPL CALC-SCNC: 15 MMOL/L — HIGH (ref 7–14)
AST SERPL-CCNC: 11 U/L — SIGNIFICANT CHANGE UP (ref 0–41)
AST SERPL-CCNC: 11 U/L — SIGNIFICANT CHANGE UP (ref 0–41)
BASOPHILS # BLD AUTO: 0.07 K/UL — SIGNIFICANT CHANGE UP (ref 0–0.2)
BASOPHILS # BLD AUTO: 0.07 K/UL — SIGNIFICANT CHANGE UP (ref 0–0.2)
BASOPHILS NFR BLD AUTO: 0.7 % — SIGNIFICANT CHANGE UP (ref 0–1)
BASOPHILS NFR BLD AUTO: 0.7 % — SIGNIFICANT CHANGE UP (ref 0–1)
BILIRUB SERPL-MCNC: 0.4 MG/DL — SIGNIFICANT CHANGE UP (ref 0.2–1.2)
BILIRUB SERPL-MCNC: 0.4 MG/DL — SIGNIFICANT CHANGE UP (ref 0.2–1.2)
BUN SERPL-MCNC: 22 MG/DL — HIGH (ref 10–20)
BUN SERPL-MCNC: 22 MG/DL — HIGH (ref 10–20)
BUN SERPL-MCNC: 25 MG/DL — HIGH (ref 10–20)
BUN SERPL-MCNC: 25 MG/DL — HIGH (ref 10–20)
CALCIUM SERPL-MCNC: 9 MG/DL — SIGNIFICANT CHANGE UP (ref 8.4–10.4)
CALCIUM SERPL-MCNC: 9 MG/DL — SIGNIFICANT CHANGE UP (ref 8.4–10.4)
CALCIUM SERPL-MCNC: 9.1 MG/DL — SIGNIFICANT CHANGE UP (ref 8.4–10.5)
CALCIUM SERPL-MCNC: 9.1 MG/DL — SIGNIFICANT CHANGE UP (ref 8.4–10.5)
CHLORIDE SERPL-SCNC: 105 MMOL/L — SIGNIFICANT CHANGE UP (ref 98–110)
CHLORIDE SERPL-SCNC: 105 MMOL/L — SIGNIFICANT CHANGE UP (ref 98–110)
CHLORIDE SERPL-SCNC: 106 MMOL/L — SIGNIFICANT CHANGE UP (ref 98–110)
CHLORIDE SERPL-SCNC: 106 MMOL/L — SIGNIFICANT CHANGE UP (ref 98–110)
CHOLEST SERPL-MCNC: 266 MG/DL — HIGH
CHOLEST SERPL-MCNC: 266 MG/DL — HIGH
CO2 SERPL-SCNC: 21 MMOL/L — SIGNIFICANT CHANGE UP (ref 17–32)
CO2 SERPL-SCNC: 21 MMOL/L — SIGNIFICANT CHANGE UP (ref 17–32)
CO2 SERPL-SCNC: 22 MMOL/L — SIGNIFICANT CHANGE UP (ref 17–32)
CO2 SERPL-SCNC: 22 MMOL/L — SIGNIFICANT CHANGE UP (ref 17–32)
CREAT SERPL-MCNC: 0.9 MG/DL — SIGNIFICANT CHANGE UP (ref 0.7–1.5)
CREAT SERPL-MCNC: 0.9 MG/DL — SIGNIFICANT CHANGE UP (ref 0.7–1.5)
CREAT SERPL-MCNC: 1 MG/DL — SIGNIFICANT CHANGE UP (ref 0.7–1.5)
CREAT SERPL-MCNC: 1 MG/DL — SIGNIFICANT CHANGE UP (ref 0.7–1.5)
EGFR: 64 ML/MIN/1.73M2 — SIGNIFICANT CHANGE UP
EGFR: 64 ML/MIN/1.73M2 — SIGNIFICANT CHANGE UP
EGFR: 72 ML/MIN/1.73M2 — SIGNIFICANT CHANGE UP
EGFR: 72 ML/MIN/1.73M2 — SIGNIFICANT CHANGE UP
EOSINOPHIL # BLD AUTO: 0.18 K/UL — SIGNIFICANT CHANGE UP (ref 0–0.7)
EOSINOPHIL # BLD AUTO: 0.18 K/UL — SIGNIFICANT CHANGE UP (ref 0–0.7)
EOSINOPHIL NFR BLD AUTO: 1.7 % — SIGNIFICANT CHANGE UP (ref 0–8)
EOSINOPHIL NFR BLD AUTO: 1.7 % — SIGNIFICANT CHANGE UP (ref 0–8)
ESTIMATED AVERAGE GLUCOSE: 143 MG/DL — HIGH (ref 68–114)
ESTIMATED AVERAGE GLUCOSE: 143 MG/DL — HIGH (ref 68–114)
GAS PNL BLDA: SIGNIFICANT CHANGE UP
GAS PNL BLDA: SIGNIFICANT CHANGE UP
GLUCOSE BLDC GLUCOMTR-MCNC: 150 MG/DL — HIGH (ref 70–99)
GLUCOSE BLDC GLUCOMTR-MCNC: 150 MG/DL — HIGH (ref 70–99)
GLUCOSE BLDC GLUCOMTR-MCNC: 186 MG/DL — HIGH (ref 70–99)
GLUCOSE BLDC GLUCOMTR-MCNC: 186 MG/DL — HIGH (ref 70–99)
GLUCOSE SERPL-MCNC: 135 MG/DL — HIGH (ref 70–99)
GLUCOSE SERPL-MCNC: 135 MG/DL — HIGH (ref 70–99)
GLUCOSE SERPL-MCNC: 150 MG/DL — HIGH (ref 70–99)
GLUCOSE SERPL-MCNC: 150 MG/DL — HIGH (ref 70–99)
HCT VFR BLD CALC: 40.6 % — SIGNIFICANT CHANGE UP (ref 37–47)
HCT VFR BLD CALC: 40.6 % — SIGNIFICANT CHANGE UP (ref 37–47)
HDLC SERPL-MCNC: 29 MG/DL — LOW
HDLC SERPL-MCNC: 29 MG/DL — LOW
HGB BLD-MCNC: 13.4 G/DL — SIGNIFICANT CHANGE UP (ref 12–16)
HGB BLD-MCNC: 13.4 G/DL — SIGNIFICANT CHANGE UP (ref 12–16)
IMM GRANULOCYTES NFR BLD AUTO: 0.3 % — SIGNIFICANT CHANGE UP (ref 0.1–0.3)
IMM GRANULOCYTES NFR BLD AUTO: 0.3 % — SIGNIFICANT CHANGE UP (ref 0.1–0.3)
LIPID PNL WITH DIRECT LDL SERPL: 193 MG/DL — HIGH
LIPID PNL WITH DIRECT LDL SERPL: 193 MG/DL — HIGH
LYMPHOCYTES # BLD AUTO: 2.74 K/UL — SIGNIFICANT CHANGE UP (ref 1.2–3.4)
LYMPHOCYTES # BLD AUTO: 2.74 K/UL — SIGNIFICANT CHANGE UP (ref 1.2–3.4)
LYMPHOCYTES # BLD AUTO: 26.6 % — SIGNIFICANT CHANGE UP (ref 20.5–51.1)
LYMPHOCYTES # BLD AUTO: 26.6 % — SIGNIFICANT CHANGE UP (ref 20.5–51.1)
MAGNESIUM SERPL-MCNC: 2.3 MG/DL — SIGNIFICANT CHANGE UP (ref 1.8–2.4)
MAGNESIUM SERPL-MCNC: 2.3 MG/DL — SIGNIFICANT CHANGE UP (ref 1.8–2.4)
MAGNESIUM SERPL-MCNC: 2.4 MG/DL — SIGNIFICANT CHANGE UP (ref 1.8–2.4)
MAGNESIUM SERPL-MCNC: 2.4 MG/DL — SIGNIFICANT CHANGE UP (ref 1.8–2.4)
MCHC RBC-ENTMCNC: 31.6 PG — HIGH (ref 27–31)
MCHC RBC-ENTMCNC: 31.6 PG — HIGH (ref 27–31)
MCHC RBC-ENTMCNC: 33 G/DL — SIGNIFICANT CHANGE UP (ref 32–37)
MCHC RBC-ENTMCNC: 33 G/DL — SIGNIFICANT CHANGE UP (ref 32–37)
MCV RBC AUTO: 95.8 FL — SIGNIFICANT CHANGE UP (ref 81–99)
MCV RBC AUTO: 95.8 FL — SIGNIFICANT CHANGE UP (ref 81–99)
MONOCYTES # BLD AUTO: 0.85 K/UL — HIGH (ref 0.1–0.6)
MONOCYTES # BLD AUTO: 0.85 K/UL — HIGH (ref 0.1–0.6)
MONOCYTES NFR BLD AUTO: 8.2 % — SIGNIFICANT CHANGE UP (ref 1.7–9.3)
MONOCYTES NFR BLD AUTO: 8.2 % — SIGNIFICANT CHANGE UP (ref 1.7–9.3)
NEUTROPHILS # BLD AUTO: 6.45 K/UL — SIGNIFICANT CHANGE UP (ref 1.4–6.5)
NEUTROPHILS # BLD AUTO: 6.45 K/UL — SIGNIFICANT CHANGE UP (ref 1.4–6.5)
NEUTROPHILS NFR BLD AUTO: 62.5 % — SIGNIFICANT CHANGE UP (ref 42.2–75.2)
NEUTROPHILS NFR BLD AUTO: 62.5 % — SIGNIFICANT CHANGE UP (ref 42.2–75.2)
NON HDL CHOLESTEROL: 237 MG/DL — HIGH
NON HDL CHOLESTEROL: 237 MG/DL — HIGH
NRBC # BLD: 0 /100 WBCS — SIGNIFICANT CHANGE UP (ref 0–0)
NRBC # BLD: 0 /100 WBCS — SIGNIFICANT CHANGE UP (ref 0–0)
PLATELET # BLD AUTO: 304 K/UL — SIGNIFICANT CHANGE UP (ref 130–400)
PLATELET # BLD AUTO: 304 K/UL — SIGNIFICANT CHANGE UP (ref 130–400)
PMV BLD: 9.3 FL — SIGNIFICANT CHANGE UP (ref 7.4–10.4)
PMV BLD: 9.3 FL — SIGNIFICANT CHANGE UP (ref 7.4–10.4)
POTASSIUM SERPL-MCNC: 4.4 MMOL/L — SIGNIFICANT CHANGE UP (ref 3.5–5)
POTASSIUM SERPL-MCNC: 4.4 MMOL/L — SIGNIFICANT CHANGE UP (ref 3.5–5)
POTASSIUM SERPL-MCNC: 4.5 MMOL/L — SIGNIFICANT CHANGE UP (ref 3.5–5)
POTASSIUM SERPL-MCNC: 4.5 MMOL/L — SIGNIFICANT CHANGE UP (ref 3.5–5)
POTASSIUM SERPL-SCNC: 4.4 MMOL/L — SIGNIFICANT CHANGE UP (ref 3.5–5)
POTASSIUM SERPL-SCNC: 4.4 MMOL/L — SIGNIFICANT CHANGE UP (ref 3.5–5)
POTASSIUM SERPL-SCNC: 4.5 MMOL/L — SIGNIFICANT CHANGE UP (ref 3.5–5)
POTASSIUM SERPL-SCNC: 4.5 MMOL/L — SIGNIFICANT CHANGE UP (ref 3.5–5)
PROT SERPL-MCNC: 6.8 G/DL — SIGNIFICANT CHANGE UP (ref 6–8)
PROT SERPL-MCNC: 6.8 G/DL — SIGNIFICANT CHANGE UP (ref 6–8)
RBC # BLD: 4.24 M/UL — SIGNIFICANT CHANGE UP (ref 4.2–5.4)
RBC # BLD: 4.24 M/UL — SIGNIFICANT CHANGE UP (ref 4.2–5.4)
RBC # FLD: 13.3 % — SIGNIFICANT CHANGE UP (ref 11.5–14.5)
RBC # FLD: 13.3 % — SIGNIFICANT CHANGE UP (ref 11.5–14.5)
SODIUM SERPL-SCNC: 138 MMOL/L — SIGNIFICANT CHANGE UP (ref 135–146)
SODIUM SERPL-SCNC: 138 MMOL/L — SIGNIFICANT CHANGE UP (ref 135–146)
SODIUM SERPL-SCNC: 142 MMOL/L — SIGNIFICANT CHANGE UP (ref 135–146)
SODIUM SERPL-SCNC: 142 MMOL/L — SIGNIFICANT CHANGE UP (ref 135–146)
TRIGL SERPL-MCNC: 218 MG/DL — HIGH
TRIGL SERPL-MCNC: 218 MG/DL — HIGH
TROPONIN T SERPL-MCNC: 0.01 NG/ML — SIGNIFICANT CHANGE UP
TROPONIN T SERPL-MCNC: 0.01 NG/ML — SIGNIFICANT CHANGE UP
TSH SERPL-MCNC: 4.14 UIU/ML — SIGNIFICANT CHANGE UP (ref 0.27–4.2)
TSH SERPL-MCNC: 4.14 UIU/ML — SIGNIFICANT CHANGE UP (ref 0.27–4.2)
WBC # BLD: 10.32 K/UL — SIGNIFICANT CHANGE UP (ref 4.8–10.8)
WBC # BLD: 10.32 K/UL — SIGNIFICANT CHANGE UP (ref 4.8–10.8)
WBC # FLD AUTO: 10.32 K/UL — SIGNIFICANT CHANGE UP (ref 4.8–10.8)
WBC # FLD AUTO: 10.32 K/UL — SIGNIFICANT CHANGE UP (ref 4.8–10.8)

## 2023-12-04 PROCEDURE — 93010 ELECTROCARDIOGRAM REPORT: CPT | Mod: 76

## 2023-12-04 PROCEDURE — 99223 1ST HOSP IP/OBS HIGH 75: CPT

## 2023-12-04 PROCEDURE — 99291 CRITICAL CARE FIRST HOUR: CPT

## 2023-12-04 PROCEDURE — 93306 TTE W/DOPPLER COMPLETE: CPT | Mod: 26

## 2023-12-04 PROCEDURE — 71045 X-RAY EXAM CHEST 1 VIEW: CPT | Mod: 26

## 2023-12-04 RX ORDER — ATORVASTATIN CALCIUM 80 MG/1
80 TABLET, FILM COATED ORAL AT BEDTIME
Refills: 0 | Status: DISCONTINUED | OUTPATIENT
Start: 2023-12-04 | End: 2023-12-07

## 2023-12-04 RX ORDER — AMLODIPINE BESYLATE 2.5 MG/1
10 TABLET ORAL DAILY
Refills: 0 | Status: DISCONTINUED | OUTPATIENT
Start: 2023-12-05 | End: 2023-12-07

## 2023-12-04 RX ORDER — FUROSEMIDE 40 MG
60 TABLET ORAL EVERY 12 HOURS
Refills: 0 | Status: DISCONTINUED | OUTPATIENT
Start: 2023-12-04 | End: 2023-12-06

## 2023-12-04 RX ORDER — FLUPHENAZINE HYDROCHLORIDE 1 MG/1
37.5 TABLET, FILM COATED ORAL
Refills: 0 | DISCHARGE

## 2023-12-04 RX ORDER — CHLORHEXIDINE GLUCONATE 213 G/1000ML
1 SOLUTION TOPICAL DAILY
Refills: 0 | Status: DISCONTINUED | OUTPATIENT
Start: 2023-12-04 | End: 2023-12-07

## 2023-12-04 RX ORDER — NITROGLYCERIN 6.5 MG
5 CAPSULE, EXTENDED RELEASE ORAL
Qty: 50 | Refills: 0 | Status: DISCONTINUED | OUTPATIENT
Start: 2023-12-04 | End: 2023-12-07

## 2023-12-04 RX ORDER — HYDRALAZINE HCL 50 MG
10 TABLET ORAL ONCE
Refills: 0 | Status: COMPLETED | OUTPATIENT
Start: 2023-12-04 | End: 2023-12-04

## 2023-12-04 RX ORDER — ALPRAZOLAM 0.25 MG
0.25 TABLET ORAL EVERY 8 HOURS
Refills: 0 | Status: DISCONTINUED | OUTPATIENT
Start: 2023-12-04 | End: 2023-12-07

## 2023-12-04 RX ORDER — HYDRALAZINE HCL 50 MG
10 TABLET ORAL EVERY 8 HOURS
Refills: 0 | Status: DISCONTINUED | OUTPATIENT
Start: 2023-12-04 | End: 2023-12-07

## 2023-12-04 RX ORDER — FLUPHENAZINE HYDROCHLORIDE 1 MG/1
1 TABLET, FILM COATED ORAL
Qty: 0 | Refills: 0 | DISCHARGE

## 2023-12-04 RX ORDER — AMLODIPINE BESYLATE 2.5 MG/1
5 TABLET ORAL ONCE
Refills: 0 | Status: COMPLETED | OUTPATIENT
Start: 2023-12-04 | End: 2023-12-04

## 2023-12-04 RX ADMIN — CHLORHEXIDINE GLUCONATE 1 APPLICATION(S): 213 SOLUTION TOPICAL at 11:22

## 2023-12-04 RX ADMIN — ATORVASTATIN CALCIUM 80 MILLIGRAM(S): 80 TABLET, FILM COATED ORAL at 22:05

## 2023-12-04 RX ADMIN — AMLODIPINE BESYLATE 5 MILLIGRAM(S): 2.5 TABLET ORAL at 09:36

## 2023-12-04 RX ADMIN — Medication 40 MILLIGRAM(S): at 06:38

## 2023-12-04 RX ADMIN — Medication 1.5 MICROGRAM(S)/MIN: at 12:04

## 2023-12-04 RX ADMIN — Medication 10 MILLIGRAM(S): at 12:12

## 2023-12-04 RX ADMIN — Medication 10 MILLIGRAM(S): at 22:05

## 2023-12-04 RX ADMIN — AMLODIPINE BESYLATE 5 MILLIGRAM(S): 2.5 TABLET ORAL at 06:38

## 2023-12-04 RX ADMIN — Medication 60 MILLIGRAM(S): at 17:28

## 2023-12-04 RX ADMIN — ENOXAPARIN SODIUM 40 MILLIGRAM(S): 100 INJECTION SUBCUTANEOUS at 06:38

## 2023-12-04 RX ADMIN — Medication 10 MILLIGRAM(S): at 13:11

## 2023-12-04 NOTE — CONSULT NOTE ADULT - ATTENDING COMMENTS
Complete Heart Block  Junctional escape rhythm    ? etiology  TSH, Lyme serology  MRI heart to assess for possible sarcoidosis  Echo (not needed if MRI is performed)  NPO p MN butch for possible PPM  CCU

## 2023-12-04 NOTE — PROGRESS NOTE ADULT - SUBJECTIVE AND OBJECTIVE BOX
24H events:    Patient is a 62y old Female who presents with a chief complaint of Shortness of breath (03 Dec 2023 11:48)    Primary diagnosis of Complete heart block    Today is hospital day 1d. This morning patient was seen and examined at bedside, resting comfortably in bed.    No acute or major events overnight.    Code Status: Full    PAST MEDICAL & SURGICAL HISTORY  Diabetes mellitus    Bipolar disorder in partial remission, most recent episode unspecified type  Last episodes six years ago    History of hysterectomy for malignancy      SOCIAL HISTORY:  Social History: Smoking      ALLERGIES:  penicillin (Other)    MEDICATIONS:  STANDING MEDICATIONS  amLODIPine   Tablet 5 milliGRAM(s) Oral daily  chlorhexidine 2% Cloths 1 Application(s) Topical daily  enoxaparin Injectable 40 milliGRAM(s) SubCutaneous every 24 hours  furosemide   Injectable 40 milliGRAM(s) IV Push two times a day    PRN MEDICATIONS  acetaminophen     Tablet .. 650 milliGRAM(s) Oral every 6 hours PRN  aluminum hydroxide/magnesium hydroxide/simethicone Suspension 30 milliLiter(s) Oral every 4 hours PRN  melatonin 3 milliGRAM(s) Oral at bedtime PRN  ondansetron Injectable 4 milliGRAM(s) IV Push every 8 hours PRN    VITALS:   T(F): 98.8  HR: 33  BP: 177/77  RR: 17  SpO2: 93%    PHYSICAL EXAM:  GENERAL:   ( x ) NAD, lying in bed comfortably     (  ) obtunded     (  ) lethargic     (  ) somnolent    HEAD:   ( x ) Atraumatic     (  ) hematoma     (  ) laceration (specify location:       )     NECK:  ( x ) Supple     (  ) neck stiffness     (  ) nuchal rigidity     (  )  no JVD     (  ) JVD present ( -- cm)    HEART:  Rate -->     (  ) normal rate     ( x ) bradycardic     (  ) tachycardic  Rhythm -->     ( x ) regular     (  ) regularly irregular     (  ) irregularly irregular  Murmurs -->     ( x ) normal s1s2     (  ) systolic murmur     (  ) diastolic murmur     (  ) continuous murmur      (  ) S3 present     (  ) S4 present    LUNGS:   ( x ) Unlabored respirations     (  ) tachypnea  ( x ) B/L air entry     (  ) decreased breath sounds in:  (location     )    ( x ) no adventitious sound     (  ) crackles     (  ) wheezing      (  ) rhonchi      (specify location:       )  (  ) chest wall tenderness (specify location:       )    ABDOMEN:   ( x ) Soft     (  ) tense   |   ( x ) nondistended     (  ) distended   |   (  ) +BS     (  ) hypoactive bowel sounds     (  ) hyperactive bowel sounds  ( x ) nontender     (  ) RUQ tenderness     (  ) RLQ tenderness     (  ) LLQ tenderness     (  ) epigastric tenderness     (  ) diffuse tenderness  (  ) Splenomegaly      (  ) Hepatomegaly      (  ) Jaundice     (  ) ecchymosis     EXTREMITIES:  ( x ) Normal     (  ) Rash     (  ) ecchymosis     (  ) varicose veins      (  ) pitting edema     (  ) non-pitting edema   (  ) ulceration     (  ) gangrene:     (location:     )    NERVOUS SYSTEM:    ( x ) A&Ox3     (  ) confused     (  ) lethargic    SKIN:   ( x ) No rashes or lesions     (  ) maculopapular rash     (  ) pustules     (  ) vesicles     (  ) ulcer     (  ) ecchymosis     (specify location:     )    LABS:                        13.4   10.32 )-----------( 304      ( 04 Dec 2023 05:31 )             40.6     12-03    138  |  104  |  23<H>  ----------------------------<  210<H>  5.9<H>   |  22  |  1.1    Ca    9.1      03 Dec 2023 09:25  Mg     2.5     12-03    TPro  7.2  /  Alb  3.8  /  TBili  0.4  /  DBili  x   /  AST  28  /  ALT  18  /  AlkPhos  89  12-03      Urinalysis Basic - ( 03 Dec 2023 09:25 )    Color: x / Appearance: x / SG: x / pH: x  Gluc: 210 mg/dL / Ketone: x  / Bili: x / Urobili: x   Blood: x / Protein: x / Nitrite: x   Leuk Esterase: x / RBC: x / WBC x   Sq Epi: x / Non Sq Epi: x / Bacteria: x      ABG - ( 04 Dec 2023 02:42 )  pH, Arterial: 7.45  pH, Blood: x     /  pCO2: 37    /  pO2: 84    / HCO3: 26    / Base Excess: 1.9   /  SaO2: 97.9              Troponin T, Serum: 0.01 ng/mL (12-04-23 @ 05:31)  Troponin T, Serum: 0.03 ng/mL *HH* (12-03-23 @ 21:01)  Lactate, Blood: 2.2 mmol/L *H* (12-03-23 @ 21:01)  Troponin T, Serum: <0.01 ng/mL (12-03-23 @ 09:25)      CARDIAC MARKERS ( 04 Dec 2023 05:31 )  x     / 0.01 ng/mL / x     / x     / x      CARDIAC MARKERS ( 03 Dec 2023 21:01 )  x     / 0.03 ng/mL / x     / x     / x      CARDIAC MARKERS ( 03 Dec 2023 09:25 )  x     / <0.01 ng/mL / x     / x     / x

## 2023-12-04 NOTE — CONSULT NOTE ADULT - ASSESSMENT
62y F hx of manic depressive disorder, type II diabetes not on any treatment, active smoker presenting for evaluation of shortness of breath and chest tightness, found to be in CHB with narrow escape rhythm. Hemodynamically stable. Explained to her indication and risks of PPM placement.     CHB, will need PPM     -CMRI today to rule out infiltrative disorders , TTE to eval LV function if CMRI cannot be done today   -keep NPO past midnight for PPM tomorrow.   -follow up TSH , lyme serologies

## 2023-12-04 NOTE — CONSULT NOTE ADULT - SUBJECTIVE AND OBJECTIVE BOX
Cardiologist:    HPI:  62y F hx of manic depressive disorder, type II diabetes not on any treatment, active smoker presenting for evaluation of shortness of breath and fevers progressive over the past 2 days. Per EMS vitals were normal and patient was placed on 2L NC for comfort. Patient arrived to the ED bradycardic with HR of 35 and  hypertensive. EKG showed complete heart block. Patient denies any dizziness. Patient endorses having constant  midsternal non-radiating chest pressure  8/10 in intensity since last 2 days. Patient denies any alleviating or exaggerating factors. Transcutaneous pacer pads were placed in the ED.     In ED vitals were  T(F): 98.9  HR: 35  BP: 219/95  RR: 19  SpO2: 95%    Labs were significant for K+ 5.9 (hemolyzed), Bun 23, Cr 1.1 ( baseline 0.7 in 2021), troponin negative, pBNP 80161. VBG: lactate 3.1, pH 7.23, PCO2 47,     CXR was unremarkable for acute cardiopulmonary findings.     Last TTE in 2021:  EF of 69 %.  Increased LV wall thickness. Grade I diastolic dysfunction.  Mild mitral annular calcification. Mild thickening of the anterior and posterior mitral valve leaflets. Mild tricuspid regurgitation.      Patient is being admitted to CCU for further evaluation and management.    (03 Dec 2023 11:48)      Cardiology Consult HPI:  Presented with 2 days of SOB and chest tightness. Not on BB at home. No h/o inflammatory diseases, chronic cough.   PAST MEDICAL & SURGICAL HISTORY  Diabetes mellitus    Bipolar disorder in partial remission, most recent episode unspecified type  Last episodes six years ago    History of hysterectomy for malignancy        FAMILY HISTORY:  FAMILY HISTORY:    [ ] no pertinent family history of premature cardiovascular disease in first degree relatives.  Mother:   Father:   Siblings:     SOCIAL HISTORY:  []smoker  []Alcohol  []Drug    ALLERGIES:  penicillin (Other)      MEDICATIONS:  MEDICATIONS  (STANDING):  atorvastatin 80 milliGRAM(s) Oral at bedtime  chlorhexidine 2% Cloths 1 Application(s) Topical daily  enoxaparin Injectable 40 milliGRAM(s) SubCutaneous every 24 hours  furosemide   Injectable 40 milliGRAM(s) IV Push two times a day    MEDICATIONS  (PRN):  acetaminophen     Tablet .. 650 milliGRAM(s) Oral every 6 hours PRN Temp greater or equal to 38C (100.4F), Mild Pain (1 - 3)  aluminum hydroxide/magnesium hydroxide/simethicone Suspension 30 milliLiter(s) Oral every 4 hours PRN Dyspepsia  melatonin 3 milliGRAM(s) Oral at bedtime PRN Insomnia  ondansetron Injectable 4 milliGRAM(s) IV Push every 8 hours PRN Nausea and/or Vomiting      HOME MEDICATIONS:  Home Medications:  Prolixin 2.5 mg/mL injectable solution: 1 application injectable every 2 weeks.  Last dose taken on 3/5/21 (07 Mar 2021 22:04)      VITALS:   T(F): 98.2 (12-04 @ 08:00), Max: 98.9 (12-03 @ 08:43)  HR: 36 (12-04 @ 10:00) (33 - 40)  BP: 190/98 (12-04 @ 09:01) (145/65 - 219/95)  BP(mean): 135 (12-04 @ 09:01) (90 - 135)  RR: 26 (12-04 @ 10:00) (14 - 31)  SpO2: 96% (12-04 @ 10:00) (87% - 100%)    I&O's Summary    03 Dec 2023 07:01  -  04 Dec 2023 07:00  --------------------------------------------------------  IN: 700 mL / OUT: 750 mL / NET: -50 mL    04 Dec 2023 07:01  -  04 Dec 2023 10:34  --------------------------------------------------------  IN: 0 mL / OUT: 600 mL / NET: -600 mL        REVIEW OF SYSTEMS:    Negative except as mentioned in HPI    CONSTITUTIONAL: No weakness, fevers or chills  EYES: No visual changes  ENT: No vertigo or throat pain   NECK: No pain or stiffness  RESPIRATORY: No cough, wheezing, hemoptysis; No shortness of breath  CARDIOVASCULAR: No chest pain or palpitations  GASTROINTESTINAL: No abdominal or epigastric pain. No nausea, vomiting, or hematemesis; No diarrhea or constipation. No melena or hematochezia.  GENITOURINARY: No dysuria, frequency or hematuria  NEUROLOGICAL: No numbness or weakness  SKIN: No itching, no rashes  MSK: FROM x4    PHYSICAL EXAM:  NEURO: Awake , alert and oriented  GEN: Not in acute distress  NECK: No JVD  LUNGS: Clear to auscultation bilaterally   CARDIOVASCULAR: S1/S2 present, RRR , no murmurs or rubs, no carotid bruits,  + PP bilaterally  ABD: Soft, non-tender, non-distended, +BS  EXT: No JOSE  SKIN: Intact    LABS:                        13.4   10.32 )-----------( 304      ( 04 Dec 2023 05:31 )             40.6     12-04    138  |  105  |  22<H>  ----------------------------<  135<H>  4.5   |  22  |  0.9    Ca    9.1      04 Dec 2023 05:31  Mg     2.4     12-04    TPro  6.8  /  Alb  3.6  /  TBili  0.4  /  DBili  x   /  AST  11  /  ALT  16  /  AlkPhos  86  12-04      Troponin T, Serum: 0.01 ng/mL (12-04-23 @ 05:31)  Troponin T, Serum: 0.03 ng/mL *HH* (12-03-23 @ 21:01)  Lactate, Blood: 2.2 mmol/L *H* (12-03-23 @ 21:01)    CARDIAC MARKERS ( 04 Dec 2023 05:31 )  x     / 0.01 ng/mL / x     / x     / x      CARDIAC MARKERS ( 03 Dec 2023 21:01 )  x     / 0.03 ng/mL / x     / x     / x      CARDIAC MARKERS ( 03 Dec 2023 09:25 )  x     / <0.01 ng/mL / x     / x     / x            Troponin trend:      12-04 Chol 266<H> LDL -- HDL 29<L> Trig 218<H>    RADIOLOGY:  -CXR:  -TTE:  -CCTA:  -STRESS TEST:  -CATHETERIZATION:    ECG:  complete heart block with narrow junctional escape   TELEMETRY EVENTS:  CHB with HR in 30s  Cardiologist:    HPI:  62y F hx of manic depressive disorder, type II diabetes not on any treatment, active smoker presenting for evaluation of shortness of breath and fevers progressive over the past 2 days. Per EMS vitals were normal and patient was placed on 2L NC for comfort. Patient arrived to the ED bradycardic with HR of 35 and  hypertensive. EKG showed complete heart block. Patient denies any dizziness. Patient endorses having constant  midsternal non-radiating chest pressure  8/10 in intensity since last 2 days. Patient denies any alleviating or exaggerating factors. Transcutaneous pacer pads were placed in the ED.     In ED vitals were  T(F): 98.9  HR: 35  BP: 219/95  RR: 19  SpO2: 95%    Labs were significant for K+ 5.9 (hemolyzed), Bun 23, Cr 1.1 ( baseline 0.7 in 2021), troponin negative, pBNP 79294. VBG: lactate 3.1, pH 7.23, PCO2 47,     CXR was unremarkable for acute cardiopulmonary findings.     Last TTE in 2021:  EF of 69 %.  Increased LV wall thickness. Grade I diastolic dysfunction.  Mild mitral annular calcification. Mild thickening of the anterior and posterior mitral valve leaflets. Mild tricuspid regurgitation.      Patient is being admitted to CCU for further evaluation and management.    (03 Dec 2023 11:48)      Cardiology Consult HPI:  Presented with 2 days of SOB and chest tightness. Not on BB at home. No h/o inflammatory diseases, chronic cough.   PAST MEDICAL & SURGICAL HISTORY  Diabetes mellitus    Bipolar disorder in partial remission, most recent episode unspecified type  Last episodes six years ago    History of hysterectomy for malignancy        FAMILY HISTORY:  FAMILY HISTORY:    [ ] no pertinent family history of premature cardiovascular disease in first degree relatives.  Mother:   Father:   Siblings:     SOCIAL HISTORY:  []smoker  []Alcohol  []Drug    ALLERGIES:  penicillin (Other)      MEDICATIONS:  MEDICATIONS  (STANDING):  atorvastatin 80 milliGRAM(s) Oral at bedtime  chlorhexidine 2% Cloths 1 Application(s) Topical daily  enoxaparin Injectable 40 milliGRAM(s) SubCutaneous every 24 hours  furosemide   Injectable 40 milliGRAM(s) IV Push two times a day    MEDICATIONS  (PRN):  acetaminophen     Tablet .. 650 milliGRAM(s) Oral every 6 hours PRN Temp greater or equal to 38C (100.4F), Mild Pain (1 - 3)  aluminum hydroxide/magnesium hydroxide/simethicone Suspension 30 milliLiter(s) Oral every 4 hours PRN Dyspepsia  melatonin 3 milliGRAM(s) Oral at bedtime PRN Insomnia  ondansetron Injectable 4 milliGRAM(s) IV Push every 8 hours PRN Nausea and/or Vomiting      HOME MEDICATIONS:  Home Medications:  Prolixin 2.5 mg/mL injectable solution: 1 application injectable every 2 weeks.  Last dose taken on 3/5/21 (07 Mar 2021 22:04)      VITALS:   T(F): 98.2 (12-04 @ 08:00), Max: 98.9 (12-03 @ 08:43)  HR: 36 (12-04 @ 10:00) (33 - 40)  BP: 190/98 (12-04 @ 09:01) (145/65 - 219/95)  BP(mean): 135 (12-04 @ 09:01) (90 - 135)  RR: 26 (12-04 @ 10:00) (14 - 31)  SpO2: 96% (12-04 @ 10:00) (87% - 100%)    I&O's Summary    03 Dec 2023 07:01  -  04 Dec 2023 07:00  --------------------------------------------------------  IN: 700 mL / OUT: 750 mL / NET: -50 mL    04 Dec 2023 07:01  -  04 Dec 2023 10:34  --------------------------------------------------------  IN: 0 mL / OUT: 600 mL / NET: -600 mL        REVIEW OF SYSTEMS:    Negative except as mentioned in HPI    CONSTITUTIONAL: No weakness, fevers or chills  EYES: No visual changes  ENT: No vertigo or throat pain   NECK: No pain or stiffness  RESPIRATORY: No cough, wheezing, hemoptysis; No shortness of breath  CARDIOVASCULAR: No chest pain or palpitations  GASTROINTESTINAL: No abdominal or epigastric pain. No nausea, vomiting, or hematemesis; No diarrhea or constipation. No melena or hematochezia.  GENITOURINARY: No dysuria, frequency or hematuria  NEUROLOGICAL: No numbness or weakness  SKIN: No itching, no rashes  MSK: FROM x4    PHYSICAL EXAM:  NEURO: Awake , alert and oriented  GEN: Not in acute distress  NECK: No JVD  LUNGS: Clear to auscultation bilaterally   CARDIOVASCULAR: S1/S2 present, RRR , no murmurs or rubs, no carotid bruits,  + PP bilaterally  ABD: Soft, non-tender, non-distended, +BS  EXT: No JOSE  SKIN: Intact    LABS:                        13.4   10.32 )-----------( 304      ( 04 Dec 2023 05:31 )             40.6     12-04    138  |  105  |  22<H>  ----------------------------<  135<H>  4.5   |  22  |  0.9    Ca    9.1      04 Dec 2023 05:31  Mg     2.4     12-04    TPro  6.8  /  Alb  3.6  /  TBili  0.4  /  DBili  x   /  AST  11  /  ALT  16  /  AlkPhos  86  12-04      Troponin T, Serum: 0.01 ng/mL (12-04-23 @ 05:31)  Troponin T, Serum: 0.03 ng/mL *HH* (12-03-23 @ 21:01)  Lactate, Blood: 2.2 mmol/L *H* (12-03-23 @ 21:01)    CARDIAC MARKERS ( 04 Dec 2023 05:31 )  x     / 0.01 ng/mL / x     / x     / x      CARDIAC MARKERS ( 03 Dec 2023 21:01 )  x     / 0.03 ng/mL / x     / x     / x      CARDIAC MARKERS ( 03 Dec 2023 09:25 )  x     / <0.01 ng/mL / x     / x     / x            Troponin trend:      12-04 Chol 266<H> LDL -- HDL 29<L> Trig 218<H>    RADIOLOGY:  -CXR:  -TTE:  -CCTA:  -STRESS TEST:  -CATHETERIZATION:    ECG:  complete heart block with narrow junctional escape   TELEMETRY EVENTS:  CHB with HR in 30s

## 2023-12-04 NOTE — PROGRESS NOTE ADULT - ASSESSMENT
62y F hx of manic depressive disorder, type II diabetes not on any treatment, active smoker presenting for evaluation of shortness of breath and fevers progressive over the past 2 days. Per EMS vitals were normal and patient was placed on 2L NC for comfort. Patient arrived to the ED bradycardic with HR of 35 and  hypertensive. EKG showed complete heart block. Patient denies any dizziness. Patient endorses having constant  midsternal non-radiating chest pressure 8/10 in intensity since last 2 days. Patient denies any alleviating or exaggerating factors. Transcutaneous pacer pads were placed in the ED. Patient is admitted to CCU for further management.    IMPRESSION:    #Chest tightness   #Dyspnea   #Cough   #Bradycardia secondary to complete heart block   #HTN Emergency   #Active Smoker   #HO Schizophrenia   #HO HTN   #HO Acute R thalamic infarct 2/2 arterial atherosclerosis in 2021  #HO DM       PLAN:     CNS: Avoid CNS depressants.     HEENT: Oral care.     PULMONARY:  HOB @ 45 degree. RVP negative; f/u procal    CARDIOVASCULAR:  - EKG Sinus rhythm with complete heart block  - s/p transcutaneous pacer  - Avoid AV rhoda blocking agents  - pBNP 26262  - Repeat TTE  - EP evaluation  - c/w Lasix IV 40 mg BID    GI: GI prophylaxis. DASH/TLC; NPO after midnight                          RENAL:  F/u lytes.  Correct as needed. accurate I/O    INFECTIOUS DISEASE:  Obtain RPR; Obtain Lyme antibodies     HEMATOLOGICAL:  DVT prophylaxis.     ENDOCRINE:  Follow up FS.  Insulin protocol if needed. Check TSH; Check Hgba1C; Check Lipid profile  62y F hx of manic depressive disorder, type II diabetes not on any treatment, active smoker presenting for evaluation of shortness of breath and fevers progressive over the past 2 days. Per EMS vitals were normal and patient was placed on 2L NC for comfort. Patient arrived to the ED bradycardic with HR of 35 and  hypertensive. EKG showed complete heart block. Patient denies any dizziness. Patient endorses having constant  midsternal non-radiating chest pressure 8/10 in intensity since last 2 days. Patient denies any alleviating or exaggerating factors. Transcutaneous pacer pads were placed in the ED. Patient is admitted to CCU for further management.    IMPRESSION:    #Chest tightness   #Dyspnea   #Cough   #Bradycardia secondary to complete heart block   #HTN Emergency   #Active Smoker   #HO Schizophrenia   #HO HTN   #HO Acute R thalamic infarct 2/2 arterial atherosclerosis in 2021  #HO DM       PLAN:     CNS: Avoid CNS depressants.     HEENT: Oral care.     PULMONARY:  HOB @ 45 degree. RVP negative; f/u procal    CARDIOVASCULAR:  - EKG Sinus rhythm with complete heart block  - s/p transcutaneous pacer  - Avoid AV rhoda blocking agents  - pBNP 10779  - Repeat TTE  - EP evaluation  - c/w Lasix IV 40 mg BID    GI: GI prophylaxis. DASH/TLC; NPO after midnight                          RENAL:  F/u lytes.  Correct as needed. accurate I/O    INFECTIOUS DISEASE:  Obtain RPR; Obtain Lyme antibodies     HEMATOLOGICAL:  DVT prophylaxis.     ENDOCRINE:  Follow up FS.  Insulin protocol if needed. Check TSH; Check Hgba1C; Check Lipid profile  62y F hx of manic depressive disorder, type II diabetes not on any treatment, active smoker presenting for evaluation of shortness of breath and fevers progressive over the past 2 days. Per EMS vitals were normal and patient was placed on 2L NC for comfort. Patient arrived to the ED bradycardic with HR of 35 and  hypertensive. EKG showed complete heart block. Patient denies any dizziness. Patient endorses having constant  midsternal non-radiating chest pressure 8/10 in intensity since last 2 days. Patient denies any alleviating or exaggerating factors. Transcutaneous pacer pads were placed in the ED. Patient is admitted to CCU for further management.    IMPRESSION:    #Chest tightness   #Dyspnea   #Cough   #Bradycardia secondary to complete heart block   #HTN Emergency   #Active Smoker   #HO Schizophrenia   #HO HTN   #HO Acute R thalamic infarct 2/2 arterial atherosclerosis in 2021  #HO DM       PLAN:     CNS: Avoid CNS depressants.     HEENT: Oral care.     PULMONARY:  HOB @ 45 degree. RVP negative; f/u procal    CARDIOVASCULAR:  - EKG Sinus rhythm with complete heart block  - s/p transcutaneous pacer  - Avoid AV rhoda blocking agents  - pBNP 12605  - Repeat TTE  - c/w Lasix IV 40 mg BID  - f/u Lyme, syphilis screen  - Cardiac MRI  - TTE 12/4: EF 65-70%. Normal global left ventricular systolic function.  - NPO after midnight for PPM tomorrow  - Start nitroglycerin drip for BP control    GI: GI prophylaxis. DASH/TLC; NPO after midnight                          RENAL:  F/u lytes.  Correct as needed. accurate I/O    INFECTIOUS DISEASE:  Obtain RPR; Obtain Lyme antibodies     HEMATOLOGICAL:  DVT prophylaxis.     ENDOCRINE:  - Follow up FS. Insulin protocol if needed  - HbA1c 6.6  - Lipid panel abnormal: Cholesterol 266, Triglycerides 218, HDL 29, Non HDL Cholesterol 237,   - c/w Atorvastatin 80mg QHS   62y F hx of manic depressive disorder, type II diabetes not on any treatment, active smoker presenting for evaluation of shortness of breath and fevers progressive over the past 2 days. Per EMS vitals were normal and patient was placed on 2L NC for comfort. Patient arrived to the ED bradycardic with HR of 35 and  hypertensive. EKG showed complete heart block. Patient denies any dizziness. Patient endorses having constant  midsternal non-radiating chest pressure 8/10 in intensity since last 2 days. Patient denies any alleviating or exaggerating factors. Transcutaneous pacer pads were placed in the ED. Patient is admitted to CCU for further management.    IMPRESSION:    #Chest tightness   #Dyspnea   #Cough   #Bradycardia secondary to complete heart block   #HTN Emergency   #Active Smoker   #HO Schizophrenia   #HO HTN   #HO Acute R thalamic infarct 2/2 arterial atherosclerosis in 2021  #HO DM       PLAN:     CNS: Avoid CNS depressants.     HEENT: Oral care.     PULMONARY:  HOB @ 45 degree. RVP negative; f/u procal    CARDIOVASCULAR:  - EKG Sinus rhythm with complete heart block  - s/p transcutaneous pacer  - Avoid AV rhoda blocking agents  - pBNP 32578  - Repeat TTE  - c/w Lasix IV 40 mg BID  - f/u Lyme, syphilis screen  - Cardiac MRI  - TTE 12/4: EF 65-70%. Normal global left ventricular systolic function.  - NPO after midnight for PPM tomorrow  - Start nitroglycerin drip for BP control    GI: GI prophylaxis. DASH/TLC; NPO after midnight                          RENAL:  F/u lytes.  Correct as needed. accurate I/O    INFECTIOUS DISEASE:  Obtain RPR; Obtain Lyme antibodies     HEMATOLOGICAL:  DVT prophylaxis.     ENDOCRINE:  - Follow up FS. Insulin protocol if needed  - HbA1c 6.6  - Lipid panel abnormal: Cholesterol 266, Triglycerides 218, HDL 29, Non HDL Cholesterol 237,   - c/w Atorvastatin 80mg QHS   62y F hx of manic depressive disorder, type II diabetes not on any treatment, active smoker presenting for evaluation of shortness of breath and fevers progressive over the past 2 days. Per EMS vitals were normal and patient was placed on 2L NC for comfort. Patient arrived to the ED bradycardic with HR of 35 and  hypertensive. EKG showed complete heart block. Patient denies any dizziness. Patient endorses having constant  midsternal non-radiating chest pressure 8/10 in intensity since last 2 days. Patient denies any alleviating or exaggerating factors. Transcutaneous pacer pads were placed in the ED. Patient is admitted to CCU for further management.    IMPRESSION:    #Chest tightness   #Dyspnea   #Cough   #Bradycardia secondary to complete heart block   #HTN Emergency   #Active Smoker   #HO Schizophrenia   #HO HTN   #HO Acute R thalamic infarct 2/2 arterial atherosclerosis in 2021  #HO DM       PLAN:     CNS  - Avoid CNS depressants  - Xanax 0.25 PRN for anxiety    HEENT: Oral care.     PULMONARY:  HOB @ 45 degree. RVP negative; f/u procal    CARDIOVASCULAR:  - EKG Sinus rhythm with complete heart block  - s/p transcutaneous pacer  - Avoid AV rhoda blocking agents  - pBNP 03390  - Repeat TTE  - c/w Lasix IV 40 mg BID  - f/u Lyme, syphilis screen  - Cardiac MRI  - TTE 12/4: EF 65-70%. Normal global left ventricular systolic function.  - NPO after midnight for PPM tomorrow  - Start nitroglycerin drip for BP control    GI: GI prophylaxis. DASH/TLC; NPO after midnight                          RENAL:  F/u lytes.  Correct as needed. accurate I/O    INFECTIOUS DISEASE:  Obtain RPR; Obtain Lyme antibodies     HEMATOLOGICAL:  DVT prophylaxis.     ENDOCRINE:  - Follow up FS. Insulin protocol if needed  - HbA1c 6.6  - Lipid panel abnormal: Cholesterol 266, Triglycerides 218, HDL 29, Non HDL Cholesterol 237,   - c/w Atorvastatin 80mg QHS   62y F hx of manic depressive disorder, type II diabetes not on any treatment, active smoker presenting for evaluation of shortness of breath and fevers progressive over the past 2 days. Per EMS vitals were normal and patient was placed on 2L NC for comfort. Patient arrived to the ED bradycardic with HR of 35 and  hypertensive. EKG showed complete heart block. Patient denies any dizziness. Patient endorses having constant  midsternal non-radiating chest pressure 8/10 in intensity since last 2 days. Patient denies any alleviating or exaggerating factors. Transcutaneous pacer pads were placed in the ED. Patient is admitted to CCU for further management.    IMPRESSION:    #Chest tightness   #Dyspnea   #Cough   #Bradycardia secondary to complete heart block   #HTN Emergency   #Active Smoker   #HO Schizophrenia   #HO HTN   #HO Acute R thalamic infarct 2/2 arterial atherosclerosis in 2021  #HO DM       PLAN:     CNS  - Avoid CNS depressants  - Xanax 0.25 PRN for anxiety    HEENT: Oral care.     PULMONARY:  HOB @ 45 degree. RVP negative; f/u procal    CARDIOVASCULAR:  - EKG Sinus rhythm with complete heart block  - s/p transcutaneous pacer  - Avoid AV rhoda blocking agents  - pBNP 83244  - Repeat TTE  - c/w Lasix IV 40 mg BID  - f/u Lyme, syphilis screen  - Cardiac MRI  - TTE 12/4: EF 65-70%. Normal global left ventricular systolic function.  - NPO after midnight for PPM tomorrow  - Start nitroglycerin drip for BP control    GI: GI prophylaxis. DASH/TLC; NPO after midnight                          RENAL:  F/u lytes.  Correct as needed. accurate I/O    INFECTIOUS DISEASE:  Obtain RPR; Obtain Lyme antibodies     HEMATOLOGICAL:  DVT prophylaxis.     ENDOCRINE:  - Follow up FS. Insulin protocol if needed  - HbA1c 6.6  - Lipid panel abnormal: Cholesterol 266, Triglycerides 218, HDL 29, Non HDL Cholesterol 237,   - c/w Atorvastatin 80mg QHS   62y F hx of manic depressive disorder, type II diabetes not on any treatment, active smoker presenting for evaluation of shortness of breath and fevers progressive over the past 2 days. Per EMS vitals were normal and patient was placed on 2L NC for comfort. Patient arrived to the ED bradycardic with HR of 35 and  hypertensive. EKG showed complete heart block. Patient denies any dizziness. Patient endorses having constant  midsternal non-radiating chest pressure 8/10 in intensity since last 2 days. Patient denies any alleviating or exaggerating factors. Transcutaneous pacer pads were placed in the ED. Patient is admitted to CCU for further management.    IMPRESSION:    #Bradycardia secondary to complete heart block   #HTN Emergency   #Active Smoker   #HO Schizophrenia   #HO HTN   #HO Acute R thalamic infarct 2/2 arterial atherosclerosis in 2021  #HO DM       PLAN:     CNS  - Avoid CNS depressants  - Xanax 0.25 PRN for anxiety    HEENT: Oral care.     PULMONARY:  HOB @ 45 degree. RVP negative; f/u procal    CARDIOVASCULAR:  - EKG Sinus rhythm with complete heart block with junctional escape  - Pacer pads in place  - Avoid AV rhoda blocking agents  - pBNP 05513  - TTE with normal EF, mild MR/TR, no sings of restrictive cardiomyopathy  - c/w Lasix IV 40 mg BID  - f/u Lyme, syphilis screen  - Cardiac MRI  - TTE 12/4: EF 65-70%. Normal global left ventricular systolic function.  - NPO after midnight for PPM tomorrow  - Start nitroglycerin drip for BP control  - C/w Hydralazine and Amlodipine PO for BP control    GI: GI prophylaxis. DASH/TLC; NPO after midnight                          RENAL:  F/u lytes.  Correct as needed. accurate I/O    INFECTIOUS DISEASE:  Obtain RPR; Obtain Lyme antibodies     HEMATOLOGICAL:  DVT prophylaxis.     ENDOCRINE:  - Follow up FS. Insulin protocol if needed  - HbA1c 6.6  - Lipid panel abnormal: Cholesterol 266, Triglycerides 218, HDL 29, Non HDL Cholesterol 237,   - c/w Atorvastatin 80mg QHS   62y F hx of manic depressive disorder, type II diabetes not on any treatment, active smoker presenting for evaluation of shortness of breath and fevers progressive over the past 2 days. Per EMS vitals were normal and patient was placed on 2L NC for comfort. Patient arrived to the ED bradycardic with HR of 35 and  hypertensive. EKG showed complete heart block. Patient denies any dizziness. Patient endorses having constant  midsternal non-radiating chest pressure 8/10 in intensity since last 2 days. Patient denies any alleviating or exaggerating factors. Transcutaneous pacer pads were placed in the ED. Patient is admitted to CCU for further management.    IMPRESSION:    #Bradycardia secondary to complete heart block   #HTN Emergency   #Active Smoker   #HO Schizophrenia   #HO HTN   #HO Acute R thalamic infarct 2/2 arterial atherosclerosis in 2021  #HO DM       PLAN:     CNS  - Avoid CNS depressants  - Xanax 0.25 PRN for anxiety    HEENT: Oral care.     PULMONARY:  HOB @ 45 degree. RVP negative; f/u procal    CARDIOVASCULAR:  - EKG Sinus rhythm with complete heart block with junctional escape  - Pacer pads in place  - Avoid AV rhoda blocking agents  - pBNP 28403  - TTE with normal EF, mild MR/TR, no sings of restrictive cardiomyopathy  - c/w Lasix IV 40 mg BID  - f/u Lyme, syphilis screen  - Cardiac MRI  - TTE 12/4: EF 65-70%. Normal global left ventricular systolic function.  - NPO after midnight for PPM tomorrow  - Start nitroglycerin drip for BP control  - C/w Hydralazine and Amlodipine PO for BP control    GI: GI prophylaxis. DASH/TLC; NPO after midnight                          RENAL:  F/u lytes.  Correct as needed. accurate I/O    INFECTIOUS DISEASE:  Obtain RPR; Obtain Lyme antibodies     HEMATOLOGICAL:  DVT prophylaxis.     ENDOCRINE:  - Follow up FS. Insulin protocol if needed  - HbA1c 6.6  - Lipid panel abnormal: Cholesterol 266, Triglycerides 218, HDL 29, Non HDL Cholesterol 237,   - c/w Atorvastatin 80mg QHS

## 2023-12-05 LAB
ALBUMIN SERPL ELPH-MCNC: 3.4 G/DL — LOW (ref 3.5–5.2)
ALBUMIN SERPL ELPH-MCNC: 3.4 G/DL — LOW (ref 3.5–5.2)
ALP SERPL-CCNC: 80 U/L — SIGNIFICANT CHANGE UP (ref 30–115)
ALP SERPL-CCNC: 80 U/L — SIGNIFICANT CHANGE UP (ref 30–115)
ALT FLD-CCNC: 13 U/L — SIGNIFICANT CHANGE UP (ref 0–41)
ALT FLD-CCNC: 13 U/L — SIGNIFICANT CHANGE UP (ref 0–41)
ANION GAP SERPL CALC-SCNC: 11 MMOL/L — SIGNIFICANT CHANGE UP (ref 7–14)
ANION GAP SERPL CALC-SCNC: 11 MMOL/L — SIGNIFICANT CHANGE UP (ref 7–14)
APPEARANCE UR: ABNORMAL
APPEARANCE UR: ABNORMAL
APTT BLD: 29.8 SEC — SIGNIFICANT CHANGE UP (ref 27–39.2)
APTT BLD: 29.8 SEC — SIGNIFICANT CHANGE UP (ref 27–39.2)
AST SERPL-CCNC: 12 U/L — SIGNIFICANT CHANGE UP (ref 0–41)
AST SERPL-CCNC: 12 U/L — SIGNIFICANT CHANGE UP (ref 0–41)
B BURGDOR C6 AB SER-ACNC: NEGATIVE — SIGNIFICANT CHANGE UP
B BURGDOR C6 AB SER-ACNC: NEGATIVE — SIGNIFICANT CHANGE UP
B BURGDOR IGG+IGM SER-ACNC: 0.03 INDEX — SIGNIFICANT CHANGE UP (ref 0.01–0.89)
B BURGDOR IGG+IGM SER-ACNC: 0.03 INDEX — SIGNIFICANT CHANGE UP (ref 0.01–0.89)
BACTERIA # UR AUTO: ABNORMAL /HPF
BACTERIA # UR AUTO: ABNORMAL /HPF
BASOPHILS # BLD AUTO: 0.04 K/UL — SIGNIFICANT CHANGE UP (ref 0–0.2)
BASOPHILS # BLD AUTO: 0.04 K/UL — SIGNIFICANT CHANGE UP (ref 0–0.2)
BASOPHILS NFR BLD AUTO: 0.3 % — SIGNIFICANT CHANGE UP (ref 0–1)
BASOPHILS NFR BLD AUTO: 0.3 % — SIGNIFICANT CHANGE UP (ref 0–1)
BILIRUB SERPL-MCNC: 0.5 MG/DL — SIGNIFICANT CHANGE UP (ref 0.2–1.2)
BILIRUB SERPL-MCNC: 0.5 MG/DL — SIGNIFICANT CHANGE UP (ref 0.2–1.2)
BILIRUB UR-MCNC: NEGATIVE — SIGNIFICANT CHANGE UP
BILIRUB UR-MCNC: NEGATIVE — SIGNIFICANT CHANGE UP
BUN SERPL-MCNC: 27 MG/DL — HIGH (ref 10–20)
BUN SERPL-MCNC: 27 MG/DL — HIGH (ref 10–20)
CALCIUM SERPL-MCNC: 8.5 MG/DL — SIGNIFICANT CHANGE UP (ref 8.4–10.5)
CALCIUM SERPL-MCNC: 8.5 MG/DL — SIGNIFICANT CHANGE UP (ref 8.4–10.5)
CAST: 2 /LPF — SIGNIFICANT CHANGE UP (ref 0–4)
CAST: 2 /LPF — SIGNIFICANT CHANGE UP (ref 0–4)
CHLORIDE SERPL-SCNC: 106 MMOL/L — SIGNIFICANT CHANGE UP (ref 98–110)
CHLORIDE SERPL-SCNC: 106 MMOL/L — SIGNIFICANT CHANGE UP (ref 98–110)
CO2 SERPL-SCNC: 22 MMOL/L — SIGNIFICANT CHANGE UP (ref 17–32)
CO2 SERPL-SCNC: 22 MMOL/L — SIGNIFICANT CHANGE UP (ref 17–32)
COLOR SPEC: YELLOW — SIGNIFICANT CHANGE UP
COLOR SPEC: YELLOW — SIGNIFICANT CHANGE UP
CREAT SERPL-MCNC: 1.1 MG/DL — SIGNIFICANT CHANGE UP (ref 0.7–1.5)
CREAT SERPL-MCNC: 1.1 MG/DL — SIGNIFICANT CHANGE UP (ref 0.7–1.5)
DIFF PNL FLD: ABNORMAL
DIFF PNL FLD: ABNORMAL
EGFR: 57 ML/MIN/1.73M2 — LOW
EGFR: 57 ML/MIN/1.73M2 — LOW
EOSINOPHIL # BLD AUTO: 0.2 K/UL — SIGNIFICANT CHANGE UP (ref 0–0.7)
EOSINOPHIL # BLD AUTO: 0.2 K/UL — SIGNIFICANT CHANGE UP (ref 0–0.7)
EOSINOPHIL NFR BLD AUTO: 1.6 % — SIGNIFICANT CHANGE UP (ref 0–8)
EOSINOPHIL NFR BLD AUTO: 1.6 % — SIGNIFICANT CHANGE UP (ref 0–8)
GLUCOSE BLDC GLUCOMTR-MCNC: 173 MG/DL — HIGH (ref 70–99)
GLUCOSE BLDC GLUCOMTR-MCNC: 173 MG/DL — HIGH (ref 70–99)
GLUCOSE SERPL-MCNC: 171 MG/DL — HIGH (ref 70–99)
GLUCOSE SERPL-MCNC: 171 MG/DL — HIGH (ref 70–99)
GLUCOSE UR QL: NEGATIVE MG/DL — SIGNIFICANT CHANGE UP
GLUCOSE UR QL: NEGATIVE MG/DL — SIGNIFICANT CHANGE UP
HCT VFR BLD CALC: 39.8 % — SIGNIFICANT CHANGE UP (ref 37–47)
HCT VFR BLD CALC: 39.8 % — SIGNIFICANT CHANGE UP (ref 37–47)
HGB BLD-MCNC: 13.4 G/DL — SIGNIFICANT CHANGE UP (ref 12–16)
HGB BLD-MCNC: 13.4 G/DL — SIGNIFICANT CHANGE UP (ref 12–16)
IMM GRANULOCYTES NFR BLD AUTO: 0.3 % — SIGNIFICANT CHANGE UP (ref 0.1–0.3)
IMM GRANULOCYTES NFR BLD AUTO: 0.3 % — SIGNIFICANT CHANGE UP (ref 0.1–0.3)
INR BLD: 1.1 RATIO — SIGNIFICANT CHANGE UP (ref 0.65–1.3)
INR BLD: 1.1 RATIO — SIGNIFICANT CHANGE UP (ref 0.65–1.3)
KETONES UR-MCNC: NEGATIVE MG/DL — SIGNIFICANT CHANGE UP
KETONES UR-MCNC: NEGATIVE MG/DL — SIGNIFICANT CHANGE UP
LEUKOCYTE ESTERASE UR-ACNC: ABNORMAL
LEUKOCYTE ESTERASE UR-ACNC: ABNORMAL
LYMPHOCYTES # BLD AUTO: 2.58 K/UL — SIGNIFICANT CHANGE UP (ref 1.2–3.4)
LYMPHOCYTES # BLD AUTO: 2.58 K/UL — SIGNIFICANT CHANGE UP (ref 1.2–3.4)
LYMPHOCYTES # BLD AUTO: 20.3 % — LOW (ref 20.5–51.1)
LYMPHOCYTES # BLD AUTO: 20.3 % — LOW (ref 20.5–51.1)
MAGNESIUM SERPL-MCNC: 2.2 MG/DL — SIGNIFICANT CHANGE UP (ref 1.8–2.4)
MAGNESIUM SERPL-MCNC: 2.2 MG/DL — SIGNIFICANT CHANGE UP (ref 1.8–2.4)
MCHC RBC-ENTMCNC: 31.4 PG — HIGH (ref 27–31)
MCHC RBC-ENTMCNC: 31.4 PG — HIGH (ref 27–31)
MCHC RBC-ENTMCNC: 33.7 G/DL — SIGNIFICANT CHANGE UP (ref 32–37)
MCHC RBC-ENTMCNC: 33.7 G/DL — SIGNIFICANT CHANGE UP (ref 32–37)
MCV RBC AUTO: 93.2 FL — SIGNIFICANT CHANGE UP (ref 81–99)
MCV RBC AUTO: 93.2 FL — SIGNIFICANT CHANGE UP (ref 81–99)
MONOCYTES # BLD AUTO: 0.89 K/UL — HIGH (ref 0.1–0.6)
MONOCYTES # BLD AUTO: 0.89 K/UL — HIGH (ref 0.1–0.6)
MONOCYTES NFR BLD AUTO: 7 % — SIGNIFICANT CHANGE UP (ref 1.7–9.3)
MONOCYTES NFR BLD AUTO: 7 % — SIGNIFICANT CHANGE UP (ref 1.7–9.3)
NEUTROPHILS # BLD AUTO: 8.93 K/UL — HIGH (ref 1.4–6.5)
NEUTROPHILS # BLD AUTO: 8.93 K/UL — HIGH (ref 1.4–6.5)
NEUTROPHILS NFR BLD AUTO: 70.5 % — SIGNIFICANT CHANGE UP (ref 42.2–75.2)
NEUTROPHILS NFR BLD AUTO: 70.5 % — SIGNIFICANT CHANGE UP (ref 42.2–75.2)
NITRITE UR-MCNC: POSITIVE
NITRITE UR-MCNC: POSITIVE
NRBC # BLD: 0 /100 WBCS — SIGNIFICANT CHANGE UP (ref 0–0)
NRBC # BLD: 0 /100 WBCS — SIGNIFICANT CHANGE UP (ref 0–0)
PH UR: 6 — SIGNIFICANT CHANGE UP (ref 5–8)
PH UR: 6 — SIGNIFICANT CHANGE UP (ref 5–8)
PHOSPHATE SERPL-MCNC: 4.5 MG/DL — SIGNIFICANT CHANGE UP (ref 2.1–4.9)
PHOSPHATE SERPL-MCNC: 4.5 MG/DL — SIGNIFICANT CHANGE UP (ref 2.1–4.9)
PLATELET # BLD AUTO: 325 K/UL — SIGNIFICANT CHANGE UP (ref 130–400)
PLATELET # BLD AUTO: 325 K/UL — SIGNIFICANT CHANGE UP (ref 130–400)
PMV BLD: 9.7 FL — SIGNIFICANT CHANGE UP (ref 7.4–10.4)
PMV BLD: 9.7 FL — SIGNIFICANT CHANGE UP (ref 7.4–10.4)
POTASSIUM SERPL-MCNC: 4.4 MMOL/L — SIGNIFICANT CHANGE UP (ref 3.5–5)
POTASSIUM SERPL-MCNC: 4.4 MMOL/L — SIGNIFICANT CHANGE UP (ref 3.5–5)
POTASSIUM SERPL-SCNC: 4.4 MMOL/L — SIGNIFICANT CHANGE UP (ref 3.5–5)
POTASSIUM SERPL-SCNC: 4.4 MMOL/L — SIGNIFICANT CHANGE UP (ref 3.5–5)
PROT SERPL-MCNC: 6.1 G/DL — SIGNIFICANT CHANGE UP (ref 6–8)
PROT SERPL-MCNC: 6.1 G/DL — SIGNIFICANT CHANGE UP (ref 6–8)
PROT UR-MCNC: NEGATIVE MG/DL — SIGNIFICANT CHANGE UP
PROT UR-MCNC: NEGATIVE MG/DL — SIGNIFICANT CHANGE UP
PROTHROM AB SERPL-ACNC: 12.6 SEC — SIGNIFICANT CHANGE UP (ref 9.95–12.87)
PROTHROM AB SERPL-ACNC: 12.6 SEC — SIGNIFICANT CHANGE UP (ref 9.95–12.87)
RBC # BLD: 4.27 M/UL — SIGNIFICANT CHANGE UP (ref 4.2–5.4)
RBC # BLD: 4.27 M/UL — SIGNIFICANT CHANGE UP (ref 4.2–5.4)
RBC # FLD: 13.2 % — SIGNIFICANT CHANGE UP (ref 11.5–14.5)
RBC # FLD: 13.2 % — SIGNIFICANT CHANGE UP (ref 11.5–14.5)
RBC CASTS # UR COMP ASSIST: 0 /HPF — SIGNIFICANT CHANGE UP (ref 0–4)
RBC CASTS # UR COMP ASSIST: 0 /HPF — SIGNIFICANT CHANGE UP (ref 0–4)
SODIUM SERPL-SCNC: 139 MMOL/L — SIGNIFICANT CHANGE UP (ref 135–146)
SODIUM SERPL-SCNC: 139 MMOL/L — SIGNIFICANT CHANGE UP (ref 135–146)
SP GR SPEC: 1.01 — SIGNIFICANT CHANGE UP (ref 1–1.03)
SP GR SPEC: 1.01 — SIGNIFICANT CHANGE UP (ref 1–1.03)
SQUAMOUS # UR AUTO: 2 /HPF — SIGNIFICANT CHANGE UP (ref 0–5)
SQUAMOUS # UR AUTO: 2 /HPF — SIGNIFICANT CHANGE UP (ref 0–5)
T PALLIDUM AB TITR SER: NEGATIVE — SIGNIFICANT CHANGE UP
T PALLIDUM AB TITR SER: NEGATIVE — SIGNIFICANT CHANGE UP
UROBILINOGEN FLD QL: 0.2 MG/DL — SIGNIFICANT CHANGE UP (ref 0.2–1)
UROBILINOGEN FLD QL: 0.2 MG/DL — SIGNIFICANT CHANGE UP (ref 0.2–1)
WBC # BLD: 12.68 K/UL — HIGH (ref 4.8–10.8)
WBC # BLD: 12.68 K/UL — HIGH (ref 4.8–10.8)
WBC # FLD AUTO: 12.68 K/UL — HIGH (ref 4.8–10.8)
WBC # FLD AUTO: 12.68 K/UL — HIGH (ref 4.8–10.8)
WBC UR QL: 264 /HPF — HIGH (ref 0–5)
WBC UR QL: 264 /HPF — HIGH (ref 0–5)

## 2023-12-05 PROCEDURE — 71045 X-RAY EXAM CHEST 1 VIEW: CPT | Mod: 26

## 2023-12-05 PROCEDURE — 75557 CARDIAC MRI FOR MORPH: CPT | Mod: 26

## 2023-12-05 PROCEDURE — 99233 SBSQ HOSP IP/OBS HIGH 50: CPT | Mod: 57

## 2023-12-05 PROCEDURE — 99291 CRITICAL CARE FIRST HOUR: CPT

## 2023-12-05 PROCEDURE — 93010 ELECTROCARDIOGRAM REPORT: CPT

## 2023-12-05 RX ORDER — CEFTRIAXONE 500 MG/1
1000 INJECTION, POWDER, FOR SOLUTION INTRAMUSCULAR; INTRAVENOUS EVERY 24 HOURS
Refills: 0 | Status: DISCONTINUED | OUTPATIENT
Start: 2023-12-05 | End: 2023-12-07

## 2023-12-05 RX ADMIN — AMLODIPINE BESYLATE 10 MILLIGRAM(S): 2.5 TABLET ORAL at 05:45

## 2023-12-05 RX ADMIN — Medication 1.5 MICROGRAM(S)/MIN: at 05:43

## 2023-12-05 RX ADMIN — Medication 10 MILLIGRAM(S): at 06:30

## 2023-12-05 RX ADMIN — CHLORHEXIDINE GLUCONATE 1 APPLICATION(S): 213 SOLUTION TOPICAL at 17:15

## 2023-12-05 RX ADMIN — ATORVASTATIN CALCIUM 80 MILLIGRAM(S): 80 TABLET, FILM COATED ORAL at 22:46

## 2023-12-05 RX ADMIN — Medication 60 MILLIGRAM(S): at 18:31

## 2023-12-05 RX ADMIN — CEFTRIAXONE 100 MILLIGRAM(S): 500 INJECTION, POWDER, FOR SOLUTION INTRAMUSCULAR; INTRAVENOUS at 22:46

## 2023-12-05 RX ADMIN — Medication 10 MILLIGRAM(S): at 22:46

## 2023-12-05 RX ADMIN — Medication 60 MILLIGRAM(S): at 05:46

## 2023-12-05 RX ADMIN — Medication 1.5 MICROGRAM(S)/MIN: at 17:13

## 2023-12-05 RX ADMIN — Medication 10 MILLIGRAM(S): at 17:14

## 2023-12-05 NOTE — PROGRESS NOTE ADULT - ASSESSMENT
62y F hx of manic depressive disorder, type II diabetes not on any treatment, active smoker presenting for evaluation of shortness of breath and chest tightness, found to be in CHB with narrow escape rhythm. Hemodynamically stable. Explained to her indication and risks of PPM placement.     CHB, will need PPM , normal LVEF, CMRI coudln't be completed due to patient anxiety. TSH wnl. Lyme serologies negative.     -keep NPO past midnight for PPM tomorrow.

## 2023-12-05 NOTE — PROGRESS NOTE ADULT - ATTENDING COMMENTS
Complete heart block    - Narrow complex escape rhythm  - Etiology unknown  - TSH, Lyme titre  - MRI w/contrast, echo  - CCU  - If no improvement, will need PPM  - No temp PM for now

## 2023-12-05 NOTE — PROGRESS NOTE ADULT - ASSESSMENT
62y F hx of manic depressive disorder, type II diabetes not on any treatment, active smoker presenting for evaluation of shortness of breath and fevers progressive over the past 2 days. Per EMS vitals were normal and patient was placed on 2L NC for comfort. Patient arrived to the ED bradycardic with HR of 35 and  hypertensive. EKG showed complete heart block. Patient denies any dizziness. Patient endorses having constant  midsternal non-radiating chest pressure 8/10 in intensity since last 2 days. Patient denies any alleviating or exaggerating factors. Transcutaneous pacer pads were placed in the ED. Patient is admitted to CCU for further management.    IMPRESSION:    #Bradycardia secondary to complete heart block   #HTN Emergency   #Active Smoker   #HO Schizophrenia   #HO HTN   #HO Acute R thalamic infarct 2/2 arterial atherosclerosis in 2021  #HO DM       PLAN:     CNS  - Avoid CNS depressants  - Xanax 0.25 PRN for anxiety    HEENT: Oral care.     PULMONARY:  HOB @ 45 degree. RVP negative; f/u procal    CARDIOVASCULAR:  - EKG Sinus rhythm with complete heart block with junctional escape  - Pacer pads in place  - Avoid AV rhoda blocking agents  - pBNP 88224  - TTE with normal EF, mild MR/TR, no sings of restrictive cardiomyopathy  - TTE 12/4: EF 65-70%. Normal global left ventricular systolic function  - Increase Lasix IV to 40 mg BID  - Cardiac MRI today  - Plan for PPM today  - c/w Nitroglycerin drip for BP control  - c/w Hydralazine and Amlodipine PO for BP control    GI: GI prophylaxis. DASH/TLC; NPO after midnight                          RENAL:  F/u lytes. Correct as needed. accurate I/O    INFECTIOUS DISEASE: Syphilis screen negative; Lyme antibodies negative    HEMATOLOGICAL:  DVT prophylaxis.     ENDOCRINE:  - Follow up FS. Insulin protocol if needed  - HbA1c 6.6  - Lipid panel abnormal: Cholesterol 266, Triglycerides 218, HDL 29, Non HDL Cholesterol 237,   - c/w Atorvastatin 80mg QHS   62y F hx of manic depressive disorder, type II diabetes not on any treatment, active smoker presenting for evaluation of shortness of breath and fevers progressive over the past 2 days. Per EMS vitals were normal and patient was placed on 2L NC for comfort. Patient arrived to the ED bradycardic with HR of 35 and  hypertensive. EKG showed complete heart block. Patient denies any dizziness. Patient endorses having constant  midsternal non-radiating chest pressure 8/10 in intensity since last 2 days. Patient denies any alleviating or exaggerating factors. Transcutaneous pacer pads were placed in the ED. Patient is admitted to CCU for further management.    IMPRESSION:    #Bradycardia secondary to complete heart block   #HTN Emergency   #Active Smoker   #HO Schizophrenia   #HO HTN   #HO Acute R thalamic infarct 2/2 arterial atherosclerosis in 2021  #HO DM       PLAN:     CNS  - Avoid CNS depressants  - Xanax 0.25 PRN for anxiety    HEENT: Oral care.     PULMONARY:  HOB @ 45 degree. RVP negative; f/u procal    CARDIOVASCULAR:  - EKG Sinus rhythm with complete heart block with junctional escape  - Pacer pads in place  - Avoid AV rhoda blocking agents  - pBNP 50949  - TTE with normal EF, mild MR/TR, no sings of restrictive cardiomyopathy  - TTE 12/4: EF 65-70%. Normal global left ventricular systolic function  - Increase Lasix IV to 40 mg BID  - Cardiac MRI today  - Plan for PPM today  - c/w Nitroglycerin drip for BP control  - c/w Hydralazine and Amlodipine PO for BP control    GI: GI prophylaxis. DASH/TLC; NPO after midnight                          RENAL:  F/u lytes. Correct as needed. accurate I/O    INFECTIOUS DISEASE: Syphilis screen negative; Lyme antibodies negative    HEMATOLOGICAL:  DVT prophylaxis.     ENDOCRINE:  - Follow up FS. Insulin protocol if needed  - HbA1c 6.6  - Lipid panel abnormal: Cholesterol 266, Triglycerides 218, HDL 29, Non HDL Cholesterol 237,   - c/w Atorvastatin 80mg QHS   62y F hx of manic depressive disorder, type II diabetes not on any treatment, active smoker presenting for evaluation of shortness of breath and fevers progressive over the past 2 days. Per EMS vitals were normal and patient was placed on 2L NC for comfort. Patient arrived to the ED bradycardic with HR of 35 and  hypertensive. EKG showed complete heart block. Patient denies any dizziness. Patient endorses having constant  midsternal non-radiating chest pressure 8/10 in intensity since last 2 days. Patient denies any alleviating or exaggerating factors. Transcutaneous pacer pads were placed in the ED. Patient is admitted to CCU for further management.    IMPRESSION:    #Bradycardia secondary to complete heart block   #HTN Emergency   #Active Smoker   #HO Schizophrenia   #HO HTN   #HO Acute R thalamic infarct 2/2 arterial atherosclerosis in 2021  #HO DM       PLAN:     CNS  - Avoid CNS depressants  - Xanax 0.25 PRN for anxiety    HEENT: Oral care.     PULMONARY:  HOB @ 45 degree. RVP negative; f/u procal    CARDIOVASCULAR:  - EKG Sinus rhythm with complete heart block with junctional escape  - Pacer pads in place  - Avoid AV rhoda blocking agents  - pBNP 50548  - TTE with normal EF, mild MR/TR, no sings of restrictive cardiomyopathy  - TTE 12/4: EF 65-70%. Normal global left ventricular systolic function  - Lasix IV to 40 mg BID  - Cardiac MRI today  - Plan for PPM today  - c/w Nitroglycerin drip for BP control  - c/w Hydralazine and Amlodipine PO for BP control    GI: GI prophylaxis. DASH/TLC; NPO after midnight                          RENAL:  F/u lytes. Correct as needed. accurate I/O    INFECTIOUS DISEASE: Syphilis screen negative; Lyme antibodies negative    HEMATOLOGICAL:  DVT prophylaxis.     ENDOCRINE:  - Follow up FS. Insulin protocol if needed  - HbA1c 6.6  - Lipid panel abnormal: Cholesterol 266, Triglycerides 218, HDL 29, Non HDL Cholesterol 237,   - c/w Atorvastatin 80mg QHS   62y F hx of manic depressive disorder, type II diabetes not on any treatment, active smoker presenting for evaluation of shortness of breath and fevers progressive over the past 2 days. Per EMS vitals were normal and patient was placed on 2L NC for comfort. Patient arrived to the ED bradycardic with HR of 35 and  hypertensive. EKG showed complete heart block. Patient denies any dizziness. Patient endorses having constant  midsternal non-radiating chest pressure 8/10 in intensity since last 2 days. Patient denies any alleviating or exaggerating factors. Transcutaneous pacer pads were placed in the ED. Patient is admitted to CCU for further management.    IMPRESSION:    #Bradycardia secondary to complete heart block   #HTN Emergency   #Active Smoker   #HO Schizophrenia   #HO HTN   #HO Acute R thalamic infarct 2/2 arterial atherosclerosis in 2021  #HO DM       PLAN:     CNS  - Avoid CNS depressants  - Xanax 0.25 PRN for anxiety    HEENT: Oral care.     PULMONARY:  HOB @ 45 degree. RVP negative; f/u procal    CARDIOVASCULAR:  - EKG Sinus rhythm with complete heart block with junctional escape  - Pacer pads in place  - Avoid AV rhoda blocking agents  - pBNP 47839  - TTE with normal EF, mild MR/TR, no sings of restrictive cardiomyopathy  - TTE 12/4: EF 65-70%. Normal global left ventricular systolic function  - Lasix IV to 40 mg BID  - Cardiac MRI today  - Plan for PPM today  - c/w Nitroglycerin drip for BP control  - c/w Hydralazine and Amlodipine PO for BP control    GI: GI prophylaxis. DASH/TLC; NPO after midnight                          RENAL:  F/u lytes. Correct as needed. accurate I/O    INFECTIOUS DISEASE: Syphilis screen negative; Lyme antibodies negative    HEMATOLOGICAL:  DVT prophylaxis.     ENDOCRINE:  - Follow up FS. Insulin protocol if needed  - HbA1c 6.6  - Lipid panel abnormal: Cholesterol 266, Triglycerides 218, HDL 29, Non HDL Cholesterol 237,   - c/w Atorvastatin 80mg QHS   62y F hx of manic depressive disorder, type II diabetes not on any treatment, active smoker presenting for evaluation of shortness of breath and fevers progressive over the past 2 days. Per EMS vitals were normal and patient was placed on 2L NC for comfort. Patient arrived to the ED bradycardic with HR of 35 and  hypertensive. EKG showed complete heart block. Patient denies any dizziness. Patient endorses having constant  midsternal non-radiating chest pressure 8/10 in intensity since last 2 days. Patient denies any alleviating or exaggerating factors. Transcutaneous pacer pads were placed in the ED. Patient is admitted to CCU for further management.    IMPRESSION:    #Bradycardia secondary to complete heart block   #HTN Emergency   #Active Smoker   #HO Schizophrenia   #HO HTN   #HO Acute R thalamic infarct 2/2 arterial atherosclerosis in 2021  #HO DM       PLAN:     CNS  - Avoid CNS depressants  - Xanax 0.25 PRN for anxiety    HEENT: Oral care.     PULMONARY:  HOB @ 45 degree. RVP negative; f/u procal    CARDIOVASCULAR:  - EKG Sinus rhythm with complete heart block with junctional escape  - Pacer pads in place  - Avoid AV rhoda blocking agents  - pBNP 63728  - TTE with normal EF, mild MR/TR, no sings of restrictive cardiomyopathy  - TTE 12/4: EF 65-70%. Normal global left ventricular systolic function  - Lasix IV to 40 mg BID  - Cardiac MRI today 12/5  - Plan for PPM tomorrow 12/6  - c/w Nitroglycerin drip for BP control  - c/w Hydralazine and Amlodipine PO for BP control    GI: GI prophylaxis. DASH/TLC; NPO after midnight                          RENAL:  F/u lytes. Correct as needed. accurate I/O    INFECTIOUS DISEASE: Syphilis screen negative; Lyme antibodies negative    HEMATOLOGICAL:  DVT prophylaxis.     ENDOCRINE:  - Follow up FS. Insulin protocol if needed  - HbA1c 6.6  - Lipid panel abnormal: Cholesterol 266, Triglycerides 218, HDL 29, Non HDL Cholesterol 237,   - c/w Atorvastatin 80mg QHS   62y F hx of manic depressive disorder, type II diabetes not on any treatment, active smoker presenting for evaluation of shortness of breath and fevers progressive over the past 2 days. Per EMS vitals were normal and patient was placed on 2L NC for comfort. Patient arrived to the ED bradycardic with HR of 35 and  hypertensive. EKG showed complete heart block. Patient denies any dizziness. Patient endorses having constant  midsternal non-radiating chest pressure 8/10 in intensity since last 2 days. Patient denies any alleviating or exaggerating factors. Transcutaneous pacer pads were placed in the ED. Patient is admitted to CCU for further management.    IMPRESSION:    #Bradycardia secondary to complete heart block   #HTN Emergency   #Active Smoker   #HO Schizophrenia   #HO HTN   #HO Acute R thalamic infarct 2/2 arterial atherosclerosis in 2021  #HO DM       PLAN:     CNS  - Avoid CNS depressants  - Xanax 0.25 PRN for anxiety    HEENT: Oral care.     PULMONARY:  HOB @ 45 degree. RVP negative; f/u procal    CARDIOVASCULAR:  - EKG Sinus rhythm with complete heart block with junctional escape  - Pacer pads in place  - Avoid AV rhoda blocking agents  - pBNP 40178  - TTE with normal EF, mild MR/TR, no sings of restrictive cardiomyopathy  - TTE 12/4: EF 65-70%. Normal global left ventricular systolic function  - Lasix IV to 40 mg BID  - Cardiac MRI today 12/5  - Plan for PPM tomorrow 12/6  - c/w Nitroglycerin drip for BP control  - c/w Hydralazine and Amlodipine PO for BP control    GI: GI prophylaxis. DASH/TLC; NPO after midnight                          RENAL:  F/u lytes. Correct as needed. accurate I/O    INFECTIOUS DISEASE: Syphilis screen negative; Lyme antibodies negative    HEMATOLOGICAL:  DVT prophylaxis.     ENDOCRINE:  - Follow up FS. Insulin protocol if needed  - HbA1c 6.6  - Lipid panel abnormal: Cholesterol 266, Triglycerides 218, HDL 29, Non HDL Cholesterol 237,   - c/w Atorvastatin 80mg QHS

## 2023-12-05 NOTE — PROGRESS NOTE ADULT - SUBJECTIVE AND OBJECTIVE BOX
Cardiologist:    HPI:  62y F hx of manic depressive disorder, type II diabetes not on any treatment, active smoker presenting for evaluation of shortness of breath and fevers progressive over the past 2 days. Per EMS vitals were normal and patient was placed on 2L NC for comfort. Patient arrived to the ED bradycardic with HR of 35 and  hypertensive. EKG showed complete heart block. Patient denies any dizziness. Patient endorses having constant  midsternal non-radiating chest pressure  8/10 in intensity since last 2 days. Patient denies any alleviating or exaggerating factors. Transcutaneous pacer pads were placed in the ED.     In ED vitals were  T(F): 98.9  HR: 35  BP: 219/95  RR: 19  SpO2: 95%    Labs were significant for K+ 5.9 (hemolyzed), Bun 23, Cr 1.1 ( baseline 0.7 in 2021), troponin negative, pBNP 23560. VBG: lactate 3.1, pH 7.23, PCO2 47,     CXR was unremarkable for acute cardiopulmonary findings.     Last TTE in 2021:  EF of 69 %.  Increased LV wall thickness. Grade I diastolic dysfunction.  Mild mitral annular calcification. Mild thickening of the anterior and posterior mitral valve leaflets. Mild tricuspid regurgitation.      Patient is being admitted to CCU for further evaluation and management.    (03 Dec 2023 11:48)      Cardiology Consult HPI:  TTE with normal LVEF. Pt wasnt able to stay still for the MRI.     PAST MEDICAL & SURGICAL HISTORY  Diabetes mellitus    Bipolar disorder in partial remission, most recent episode unspecified type  Last episodes six years ago    History of hysterectomy for malignancy        FAMILY HISTORY:  FAMILY HISTORY:    [ ] no pertinent family history of premature cardiovascular disease in first degree relatives.  Mother:   Father:   Siblings:     SOCIAL HISTORY:  []smoker  []Alcohol  []Drug    ALLERGIES:  penicillin (Other)      MEDICATIONS:  MEDICATIONS  (STANDING):  amLODIPine   Tablet 10 milliGRAM(s) Oral daily  atorvastatin 80 milliGRAM(s) Oral at bedtime  chlorhexidine 2% Cloths 1 Application(s) Topical daily  furosemide   Injectable 60 milliGRAM(s) IV Push every 12 hours  hydrALAZINE 10 milliGRAM(s) Oral every 8 hours  nitroglycerin  Infusion 5 MICROgram(s)/Min (1.5 mL/Hr) IV Continuous <Continuous>    MEDICATIONS  (PRN):  acetaminophen     Tablet .. 650 milliGRAM(s) Oral every 6 hours PRN Temp greater or equal to 38C (100.4F), Mild Pain (1 - 3)  ALPRAZolam 0.25 milliGRAM(s) Oral every 8 hours PRN Anxiety  aluminum hydroxide/magnesium hydroxide/simethicone Suspension 30 milliLiter(s) Oral every 4 hours PRN Dyspepsia  melatonin 3 milliGRAM(s) Oral at bedtime PRN Insomnia  ondansetron Injectable 4 milliGRAM(s) IV Push every 8 hours PRN Nausea and/or Vomiting      HOME MEDICATIONS:  Home Medications:  Melatonin 3 mg oral tablet: 1 tab(s) orally once a day (at bedtime) as needed for  insomnia (04 Dec 2023 12:53)  pioglitazone 30 mg oral tablet: 1 tab(s) orally once a day (in the morning) (04 Dec 2023 12:52)  Prolixin Decanoate 25 mg/mL injectable solution: 37.5 milligram(s) intramuscularly every 2 weeks (04 Dec 2023 12:50)      VITALS:   T(F): 97.2 (12-05 @ 12:00), Max: 98.9 (12-03 @ 08:43)  HR: 34 (12-05 @ 14:00) (33 - 40)  BP: 133/62 (12-05 @ 14:00) (124/58 - 219/95)  BP(mean): 89 (12-05 @ 14:00) (84 - 141)  RR: 20 (12-05 @ 14:00) (13 - 31)  SpO2: 97% (12-05 @ 14:00) (87% - 100%)    I&O's Summary    04 Dec 2023 07:01  -  05 Dec 2023 07:00  --------------------------------------------------------  IN: 211.5 mL / OUT: 1900 mL / NET: -1688.5 mL    05 Dec 2023 07:01  -  05 Dec 2023 14:48  --------------------------------------------------------  IN: 40.5 mL / OUT: 400 mL / NET: -359.5 mL        REVIEW OF SYSTEMS:    Negative except as mentioned in HPI    CONSTITUTIONAL: No weakness, fevers or chills  EYES: No visual changes  ENT: No vertigo or throat pain   NECK: No pain or stiffness  RESPIRATORY: No cough, wheezing, hemoptysis; No shortness of breath  CARDIOVASCULAR: No chest pain or palpitations  GASTROINTESTINAL: No abdominal or epigastric pain. No nausea, vomiting, or hematemesis; No diarrhea or constipation. No melena or hematochezia.  GENITOURINARY: No dysuria, frequency or hematuria  NEUROLOGICAL: No numbness or weakness  SKIN: No itching, no rashes  MSK: FROM x4    PHYSICAL EXAM:  NEURO: Awake , alert and oriented  GEN: Not in acute distress  NECK: No JVD  LUNGS: Clear to auscultation bilaterally   CARDIOVASCULAR: S1/S2 present, RRR , no murmurs or rubs, no carotid bruits,  + PP bilaterally  ABD: Soft, non-tender, non-distended, +BS  EXT: No JOSE  SKIN: Intact    LABS:                        13.4   12.68 )-----------( 325      ( 05 Dec 2023 06:10 )             39.8     12-05    139  |  106  |  27<H>  ----------------------------<  171<H>  4.4   |  22  |  1.1    Ca    8.5      05 Dec 2023 06:10  Phos  4.5     12-05  Mg     2.2     12-05    TPro  6.1  /  Alb  3.4<L>  /  TBili  0.5  /  DBili  x   /  AST  12  /  ALT  13  /  AlkPhos  80  12-05    PT/INR - ( 05 Dec 2023 06:10 )   PT: 12.60 sec;   INR: 1.10 ratio         PTT - ( 05 Dec 2023 06:10 )  PTT:29.8 sec    CARDIAC MARKERS ( 04 Dec 2023 05:31 )  x     / 0.01 ng/mL / x     / x     / x      CARDIAC MARKERS ( 03 Dec 2023 21:01 )  x     / 0.03 ng/mL / x     / x     / x            Troponin trend:      12-04 Chol 266<H> LDL -- HDL 29<L> Trig 218<H>    RADIOLOGY:  -CXR:  -TTE:  -CCTA:  -STRESS TEST:  -CATHETERIZATION:    ECG:    TELEMETRY EVENTS:   Cardiologist:    HPI:  62y F hx of manic depressive disorder, type II diabetes not on any treatment, active smoker presenting for evaluation of shortness of breath and fevers progressive over the past 2 days. Per EMS vitals were normal and patient was placed on 2L NC for comfort. Patient arrived to the ED bradycardic with HR of 35 and  hypertensive. EKG showed complete heart block. Patient denies any dizziness. Patient endorses having constant  midsternal non-radiating chest pressure  8/10 in intensity since last 2 days. Patient denies any alleviating or exaggerating factors. Transcutaneous pacer pads were placed in the ED.     In ED vitals were  T(F): 98.9  HR: 35  BP: 219/95  RR: 19  SpO2: 95%    Labs were significant for K+ 5.9 (hemolyzed), Bun 23, Cr 1.1 ( baseline 0.7 in 2021), troponin negative, pBNP 83183. VBG: lactate 3.1, pH 7.23, PCO2 47,     CXR was unremarkable for acute cardiopulmonary findings.     Last TTE in 2021:  EF of 69 %.  Increased LV wall thickness. Grade I diastolic dysfunction.  Mild mitral annular calcification. Mild thickening of the anterior and posterior mitral valve leaflets. Mild tricuspid regurgitation.      Patient is being admitted to CCU for further evaluation and management.    (03 Dec 2023 11:48)      Cardiology Consult HPI:  TTE with normal LVEF. Pt wasnt able to stay still for the MRI.     PAST MEDICAL & SURGICAL HISTORY  Diabetes mellitus    Bipolar disorder in partial remission, most recent episode unspecified type  Last episodes six years ago    History of hysterectomy for malignancy        FAMILY HISTORY:  FAMILY HISTORY:    [ ] no pertinent family history of premature cardiovascular disease in first degree relatives.  Mother:   Father:   Siblings:     SOCIAL HISTORY:  []smoker  []Alcohol  []Drug    ALLERGIES:  penicillin (Other)      MEDICATIONS:  MEDICATIONS  (STANDING):  amLODIPine   Tablet 10 milliGRAM(s) Oral daily  atorvastatin 80 milliGRAM(s) Oral at bedtime  chlorhexidine 2% Cloths 1 Application(s) Topical daily  furosemide   Injectable 60 milliGRAM(s) IV Push every 12 hours  hydrALAZINE 10 milliGRAM(s) Oral every 8 hours  nitroglycerin  Infusion 5 MICROgram(s)/Min (1.5 mL/Hr) IV Continuous <Continuous>    MEDICATIONS  (PRN):  acetaminophen     Tablet .. 650 milliGRAM(s) Oral every 6 hours PRN Temp greater or equal to 38C (100.4F), Mild Pain (1 - 3)  ALPRAZolam 0.25 milliGRAM(s) Oral every 8 hours PRN Anxiety  aluminum hydroxide/magnesium hydroxide/simethicone Suspension 30 milliLiter(s) Oral every 4 hours PRN Dyspepsia  melatonin 3 milliGRAM(s) Oral at bedtime PRN Insomnia  ondansetron Injectable 4 milliGRAM(s) IV Push every 8 hours PRN Nausea and/or Vomiting      HOME MEDICATIONS:  Home Medications:  Melatonin 3 mg oral tablet: 1 tab(s) orally once a day (at bedtime) as needed for  insomnia (04 Dec 2023 12:53)  pioglitazone 30 mg oral tablet: 1 tab(s) orally once a day (in the morning) (04 Dec 2023 12:52)  Prolixin Decanoate 25 mg/mL injectable solution: 37.5 milligram(s) intramuscularly every 2 weeks (04 Dec 2023 12:50)      VITALS:   T(F): 97.2 (12-05 @ 12:00), Max: 98.9 (12-03 @ 08:43)  HR: 34 (12-05 @ 14:00) (33 - 40)  BP: 133/62 (12-05 @ 14:00) (124/58 - 219/95)  BP(mean): 89 (12-05 @ 14:00) (84 - 141)  RR: 20 (12-05 @ 14:00) (13 - 31)  SpO2: 97% (12-05 @ 14:00) (87% - 100%)    I&O's Summary    04 Dec 2023 07:01  -  05 Dec 2023 07:00  --------------------------------------------------------  IN: 211.5 mL / OUT: 1900 mL / NET: -1688.5 mL    05 Dec 2023 07:01  -  05 Dec 2023 14:48  --------------------------------------------------------  IN: 40.5 mL / OUT: 400 mL / NET: -359.5 mL        REVIEW OF SYSTEMS:    Negative except as mentioned in HPI    CONSTITUTIONAL: No weakness, fevers or chills  EYES: No visual changes  ENT: No vertigo or throat pain   NECK: No pain or stiffness  RESPIRATORY: No cough, wheezing, hemoptysis; No shortness of breath  CARDIOVASCULAR: No chest pain or palpitations  GASTROINTESTINAL: No abdominal or epigastric pain. No nausea, vomiting, or hematemesis; No diarrhea or constipation. No melena or hematochezia.  GENITOURINARY: No dysuria, frequency or hematuria  NEUROLOGICAL: No numbness or weakness  SKIN: No itching, no rashes  MSK: FROM x4    PHYSICAL EXAM:  NEURO: Awake , alert and oriented  GEN: Not in acute distress  NECK: No JVD  LUNGS: Clear to auscultation bilaterally   CARDIOVASCULAR: S1/S2 present, RRR , no murmurs or rubs, no carotid bruits,  + PP bilaterally  ABD: Soft, non-tender, non-distended, +BS  EXT: No JOSE  SKIN: Intact    LABS:                        13.4   12.68 )-----------( 325      ( 05 Dec 2023 06:10 )             39.8     12-05    139  |  106  |  27<H>  ----------------------------<  171<H>  4.4   |  22  |  1.1    Ca    8.5      05 Dec 2023 06:10  Phos  4.5     12-05  Mg     2.2     12-05    TPro  6.1  /  Alb  3.4<L>  /  TBili  0.5  /  DBili  x   /  AST  12  /  ALT  13  /  AlkPhos  80  12-05    PT/INR - ( 05 Dec 2023 06:10 )   PT: 12.60 sec;   INR: 1.10 ratio         PTT - ( 05 Dec 2023 06:10 )  PTT:29.8 sec    CARDIAC MARKERS ( 04 Dec 2023 05:31 )  x     / 0.01 ng/mL / x     / x     / x      CARDIAC MARKERS ( 03 Dec 2023 21:01 )  x     / 0.03 ng/mL / x     / x     / x            Troponin trend:      12-04 Chol 266<H> LDL -- HDL 29<L> Trig 218<H>    RADIOLOGY:  -CXR:  -TTE:  -CCTA:  -STRESS TEST:  -CATHETERIZATION:    ECG:    TELEMETRY EVENTS:

## 2023-12-05 NOTE — PROGRESS NOTE ADULT - SUBJECTIVE AND OBJECTIVE BOX
24H events:    Patient is a 62y old Female who presents with a chief complaint of Shortness of breath (04 Dec 2023 10:33)    Primary diagnosis of Complete heart block    Today is hospital day 2d. This morning patient was seen and examined at bedside, resting comfortably in bed. BP is better controlled with nitro drip.  No acute or major events overnight.    Code Status: Full    PAST MEDICAL & SURGICAL HISTORY  Diabetes mellitus    Bipolar disorder in partial remission, most recent episode unspecified type  Last episodes six years ago    History of hysterectomy for malignancy    ALLERGIES:  penicillin (Other)    MEDICATIONS:  STANDING MEDICATIONS  amLODIPine   Tablet 10 milliGRAM(s) Oral daily  atorvastatin 80 milliGRAM(s) Oral at bedtime  chlorhexidine 2% Cloths 1 Application(s) Topical daily  enoxaparin Injectable 40 milliGRAM(s) SubCutaneous every 24 hours  furosemide   Injectable 60 milliGRAM(s) IV Push every 12 hours  hydrALAZINE 10 milliGRAM(s) Oral every 8 hours  nitroglycerin  Infusion 5 MICROgram(s)/Min IV Continuous <Continuous>    PRN MEDICATIONS  acetaminophen     Tablet .. 650 milliGRAM(s) Oral every 6 hours PRN  ALPRAZolam 0.25 milliGRAM(s) Oral every 8 hours PRN  aluminum hydroxide/magnesium hydroxide/simethicone Suspension 30 milliLiter(s) Oral every 4 hours PRN  melatonin 3 milliGRAM(s) Oral at bedtime PRN  ondansetron Injectable 4 milliGRAM(s) IV Push every 8 hours PRN    VITALS:   T(F): 98.5  HR: 35  BP: 171/72  RR: 17  SpO2: 94%    PHYSICAL EXAM:  GENERAL:   ( x ) NAD, lying in bed comfortably     (  ) obtunded     (  ) lethargic     (  ) somnolent    HEAD:   ( x ) Atraumatic     (  ) hematoma     (  ) laceration (specify location:       )     NECK:  ( x ) Supple     (  ) neck stiffness     (  ) nuchal rigidity     (  )  no JVD     (  ) JVD present ( -- cm)    HEART:  Rate -->     (  ) normal rate     ( x ) bradycardic     (  ) tachycardic  Rhythm -->     ( x ) regular     (  ) regularly irregular     (  ) irregularly irregular  Murmurs -->     ( x ) normal s1s2     (  ) systolic murmur     (  ) diastolic murmur     (  ) continuous murmur      (  ) S3 present     (  ) S4 present    LUNGS:   ( x ) Unlabored respirations     (  ) tachypnea  ( x ) B/L air entry     (  ) decreased breath sounds in:  (location     )    ( x ) no adventitious sound     (  ) crackles     (  ) wheezing      (  ) rhonchi      (specify location:       )  (  ) chest wall tenderness (specify location:       )    ABDOMEN:   ( x ) Soft     (  ) tense   |   ( x ) nondistended     (  ) distended   |   (  ) +BS     (  ) hypoactive bowel sounds     (  ) hyperactive bowel sounds  ( x ) nontender     (  ) RUQ tenderness     (  ) RLQ tenderness     (  ) LLQ tenderness     (  ) epigastric tenderness     (  ) diffuse tenderness  (  ) Splenomegaly      (  ) Hepatomegaly      (  ) Jaundice     (  ) ecchymosis     EXTREMITIES:  ( x ) Normal     (  ) Rash     (  ) ecchymosis     (  ) varicose veins      (  ) pitting edema     (  ) non-pitting edema   (  ) ulceration     (  ) gangrene:     (location:     )    NERVOUS SYSTEM:    ( x ) A&Ox3     (  ) confused     (  ) lethargic    SKIN:   ( x ) No rashes or lesions     (  ) maculopapular rash     (  ) pustules     (  ) vesicles     (  ) ulcer     (  ) ecchymosis     (specify location:     )    LABS:                        13.4   10.32 )-----------( 304      ( 04 Dec 2023 05:31 )             40.6     12-04    142  |  106  |  25<H>  ----------------------------<  150<H>  4.4   |  21  |  1.0    Ca    9.0      04 Dec 2023 16:51  Mg     2.3     12-04    TPro  6.8  /  Alb  3.6  /  TBili  0.4  /  DBili  x   /  AST  11  /  ALT  16  /  AlkPhos  86  12-04      Urinalysis Basic - ( 04 Dec 2023 16:51 )    Color: x / Appearance: x / SG: x / pH: x  Gluc: 150 mg/dL / Ketone: x  / Bili: x / Urobili: x   Blood: x / Protein: x / Nitrite: x   Leuk Esterase: x / RBC: x / WBC x   Sq Epi: x / Non Sq Epi: x / Bacteria: x      ABG - ( 04 Dec 2023 02:42 )  pH, Arterial: 7.45  pH, Blood: x     /  pCO2: 37    /  pO2: 84    / HCO3: 26    / Base Excess: 1.9   /  SaO2: 97.9                  CARDIAC MARKERS ( 04 Dec 2023 05:31 )  x     / 0.01 ng/mL / x     / x     / x      CARDIAC MARKERS ( 03 Dec 2023 21:01 )  x     / 0.03 ng/mL / x     / x     / x      CARDIAC MARKERS ( 03 Dec 2023 09:25 )  x     / <0.01 ng/mL / x     / x     / x         24H events:    Patient is a 62y old Female who presents with a chief complaint of Shortness of breath (04 Dec 2023 10:33)    Primary diagnosis of Complete heart block    Today is hospital day 2d. This morning patient was seen and examined at bedside, resting comfortably in bed.  No acute or major events overnight.    Code Status: Full    PAST MEDICAL & SURGICAL HISTORY  Diabetes mellitus    Bipolar disorder in partial remission, most recent episode unspecified type  Last episodes six years ago    History of hysterectomy for malignancy    ALLERGIES:  penicillin (Other)    MEDICATIONS:  STANDING MEDICATIONS  amLODIPine   Tablet 10 milliGRAM(s) Oral daily  atorvastatin 80 milliGRAM(s) Oral at bedtime  chlorhexidine 2% Cloths 1 Application(s) Topical daily  enoxaparin Injectable 40 milliGRAM(s) SubCutaneous every 24 hours  furosemide   Injectable 60 milliGRAM(s) IV Push every 12 hours  hydrALAZINE 10 milliGRAM(s) Oral every 8 hours  nitroglycerin  Infusion 5 MICROgram(s)/Min IV Continuous <Continuous>    PRN MEDICATIONS  acetaminophen     Tablet .. 650 milliGRAM(s) Oral every 6 hours PRN  ALPRAZolam 0.25 milliGRAM(s) Oral every 8 hours PRN  aluminum hydroxide/magnesium hydroxide/simethicone Suspension 30 milliLiter(s) Oral every 4 hours PRN  melatonin 3 milliGRAM(s) Oral at bedtime PRN  ondansetron Injectable 4 milliGRAM(s) IV Push every 8 hours PRN    VITALS:   T(F): 98.5  HR: 35  BP: 171/72  RR: 17  SpO2: 94%    PHYSICAL EXAM:  GENERAL:   ( x ) NAD, lying in bed comfortably     (  ) obtunded     (  ) lethargic     (  ) somnolent    HEAD:   ( x ) Atraumatic     (  ) hematoma     (  ) laceration (specify location:       )     NECK:  ( x ) Supple     (  ) neck stiffness     (  ) nuchal rigidity     (  )  no JVD     (  ) JVD present ( -- cm)    HEART:  Rate -->     (  ) normal rate     ( x ) bradycardic     (  ) tachycardic  Rhythm -->     ( x ) regular     (  ) regularly irregular     (  ) irregularly irregular  Murmurs -->     ( x ) normal s1s2     (  ) systolic murmur     (  ) diastolic murmur     (  ) continuous murmur      (  ) S3 present     (  ) S4 present    LUNGS:   ( x ) Unlabored respirations     (  ) tachypnea  ( x ) B/L air entry     (  ) decreased breath sounds in:  (location     )    ( x ) no adventitious sound     (  ) crackles     (  ) wheezing      (  ) rhonchi      (specify location:       )  (  ) chest wall tenderness (specify location:       )    ABDOMEN:   ( x ) Soft     (  ) tense   |   ( x ) nondistended     (  ) distended   |   (  ) +BS     (  ) hypoactive bowel sounds     (  ) hyperactive bowel sounds  ( x ) nontender     (  ) RUQ tenderness     (  ) RLQ tenderness     (  ) LLQ tenderness     (  ) epigastric tenderness     (  ) diffuse tenderness  (  ) Splenomegaly      (  ) Hepatomegaly      (  ) Jaundice     (  ) ecchymosis     EXTREMITIES:  ( x ) Normal     (  ) Rash     (  ) ecchymosis     (  ) varicose veins      (  ) pitting edema     (  ) non-pitting edema   (  ) ulceration     (  ) gangrene:     (location:     )    NERVOUS SYSTEM:    ( x ) A&Ox3     (  ) confused     (  ) lethargic    SKIN:   ( x ) No rashes or lesions     (  ) maculopapular rash     (  ) pustules     (  ) vesicles     (  ) ulcer     (  ) ecchymosis     (specify location:     )    LABS:                        13.4   10.32 )-----------( 304      ( 04 Dec 2023 05:31 )             40.6     12-04    142  |  106  |  25<H>  ----------------------------<  150<H>  4.4   |  21  |  1.0    Ca    9.0      04 Dec 2023 16:51  Mg     2.3     12-04    TPro  6.8  /  Alb  3.6  /  TBili  0.4  /  DBili  x   /  AST  11  /  ALT  16  /  AlkPhos  86  12-04      Urinalysis Basic - ( 04 Dec 2023 16:51 )    Color: x / Appearance: x / SG: x / pH: x  Gluc: 150 mg/dL / Ketone: x  / Bili: x / Urobili: x   Blood: x / Protein: x / Nitrite: x   Leuk Esterase: x / RBC: x / WBC x   Sq Epi: x / Non Sq Epi: x / Bacteria: x      ABG - ( 04 Dec 2023 02:42 )  pH, Arterial: 7.45  pH, Blood: x     /  pCO2: 37    /  pO2: 84    / HCO3: 26    / Base Excess: 1.9   /  SaO2: 97.9                  CARDIAC MARKERS ( 04 Dec 2023 05:31 )  x     / 0.01 ng/mL / x     / x     / x      CARDIAC MARKERS ( 03 Dec 2023 21:01 )  x     / 0.03 ng/mL / x     / x     / x      CARDIAC MARKERS ( 03 Dec 2023 09:25 )  x     / <0.01 ng/mL / x     / x     / x

## 2023-12-06 LAB
ALBUMIN SERPL ELPH-MCNC: 3.5 G/DL — SIGNIFICANT CHANGE UP (ref 3.5–5.2)
ALBUMIN SERPL ELPH-MCNC: 3.5 G/DL — SIGNIFICANT CHANGE UP (ref 3.5–5.2)
ALP SERPL-CCNC: 79 U/L — SIGNIFICANT CHANGE UP (ref 30–115)
ALP SERPL-CCNC: 79 U/L — SIGNIFICANT CHANGE UP (ref 30–115)
ALT FLD-CCNC: 11 U/L — SIGNIFICANT CHANGE UP (ref 0–41)
ALT FLD-CCNC: 11 U/L — SIGNIFICANT CHANGE UP (ref 0–41)
ANION GAP SERPL CALC-SCNC: 10 MMOL/L — SIGNIFICANT CHANGE UP (ref 7–14)
ANION GAP SERPL CALC-SCNC: 10 MMOL/L — SIGNIFICANT CHANGE UP (ref 7–14)
APTT BLD: 30.9 SEC — SIGNIFICANT CHANGE UP (ref 27–39.2)
APTT BLD: 30.9 SEC — SIGNIFICANT CHANGE UP (ref 27–39.2)
AST SERPL-CCNC: 8 U/L — SIGNIFICANT CHANGE UP (ref 0–41)
AST SERPL-CCNC: 8 U/L — SIGNIFICANT CHANGE UP (ref 0–41)
BASOPHILS # BLD AUTO: 0.06 K/UL — SIGNIFICANT CHANGE UP (ref 0–0.2)
BASOPHILS # BLD AUTO: 0.06 K/UL — SIGNIFICANT CHANGE UP (ref 0–0.2)
BASOPHILS NFR BLD AUTO: 0.5 % — SIGNIFICANT CHANGE UP (ref 0–1)
BASOPHILS NFR BLD AUTO: 0.5 % — SIGNIFICANT CHANGE UP (ref 0–1)
BILIRUB SERPL-MCNC: 0.4 MG/DL — SIGNIFICANT CHANGE UP (ref 0.2–1.2)
BILIRUB SERPL-MCNC: 0.4 MG/DL — SIGNIFICANT CHANGE UP (ref 0.2–1.2)
BUN SERPL-MCNC: 40 MG/DL — HIGH (ref 10–20)
BUN SERPL-MCNC: 40 MG/DL — HIGH (ref 10–20)
CALCIUM SERPL-MCNC: 8.8 MG/DL — SIGNIFICANT CHANGE UP (ref 8.4–10.5)
CALCIUM SERPL-MCNC: 8.8 MG/DL — SIGNIFICANT CHANGE UP (ref 8.4–10.5)
CHLORIDE SERPL-SCNC: 106 MMOL/L — SIGNIFICANT CHANGE UP (ref 98–110)
CHLORIDE SERPL-SCNC: 106 MMOL/L — SIGNIFICANT CHANGE UP (ref 98–110)
CO2 SERPL-SCNC: 24 MMOL/L — SIGNIFICANT CHANGE UP (ref 17–32)
CO2 SERPL-SCNC: 24 MMOL/L — SIGNIFICANT CHANGE UP (ref 17–32)
CREAT SERPL-MCNC: 1.2 MG/DL — SIGNIFICANT CHANGE UP (ref 0.7–1.5)
CREAT SERPL-MCNC: 1.2 MG/DL — SIGNIFICANT CHANGE UP (ref 0.7–1.5)
EGFR: 51 ML/MIN/1.73M2 — LOW
EGFR: 51 ML/MIN/1.73M2 — LOW
EOSINOPHIL # BLD AUTO: 0.29 K/UL — SIGNIFICANT CHANGE UP (ref 0–0.7)
EOSINOPHIL # BLD AUTO: 0.29 K/UL — SIGNIFICANT CHANGE UP (ref 0–0.7)
EOSINOPHIL NFR BLD AUTO: 2.5 % — SIGNIFICANT CHANGE UP (ref 0–8)
EOSINOPHIL NFR BLD AUTO: 2.5 % — SIGNIFICANT CHANGE UP (ref 0–8)
GLUCOSE BLDC GLUCOMTR-MCNC: 169 MG/DL — HIGH (ref 70–99)
GLUCOSE BLDC GLUCOMTR-MCNC: 169 MG/DL — HIGH (ref 70–99)
GLUCOSE BLDC GLUCOMTR-MCNC: 188 MG/DL — HIGH (ref 70–99)
GLUCOSE BLDC GLUCOMTR-MCNC: 188 MG/DL — HIGH (ref 70–99)
GLUCOSE BLDC GLUCOMTR-MCNC: 99 MG/DL — SIGNIFICANT CHANGE UP (ref 70–99)
GLUCOSE BLDC GLUCOMTR-MCNC: 99 MG/DL — SIGNIFICANT CHANGE UP (ref 70–99)
GLUCOSE SERPL-MCNC: 162 MG/DL — HIGH (ref 70–99)
GLUCOSE SERPL-MCNC: 162 MG/DL — HIGH (ref 70–99)
HCT VFR BLD CALC: 39.1 % — SIGNIFICANT CHANGE UP (ref 37–47)
HCT VFR BLD CALC: 39.1 % — SIGNIFICANT CHANGE UP (ref 37–47)
HGB BLD-MCNC: 13 G/DL — SIGNIFICANT CHANGE UP (ref 12–16)
HGB BLD-MCNC: 13 G/DL — SIGNIFICANT CHANGE UP (ref 12–16)
IMM GRANULOCYTES NFR BLD AUTO: 0.3 % — SIGNIFICANT CHANGE UP (ref 0.1–0.3)
IMM GRANULOCYTES NFR BLD AUTO: 0.3 % — SIGNIFICANT CHANGE UP (ref 0.1–0.3)
INR BLD: 1.16 RATIO — SIGNIFICANT CHANGE UP (ref 0.65–1.3)
INR BLD: 1.16 RATIO — SIGNIFICANT CHANGE UP (ref 0.65–1.3)
LYMPHOCYTES # BLD AUTO: 28.9 % — SIGNIFICANT CHANGE UP (ref 20.5–51.1)
LYMPHOCYTES # BLD AUTO: 28.9 % — SIGNIFICANT CHANGE UP (ref 20.5–51.1)
LYMPHOCYTES # BLD AUTO: 3.39 K/UL — SIGNIFICANT CHANGE UP (ref 1.2–3.4)
LYMPHOCYTES # BLD AUTO: 3.39 K/UL — SIGNIFICANT CHANGE UP (ref 1.2–3.4)
MAGNESIUM SERPL-MCNC: 2.4 MG/DL — SIGNIFICANT CHANGE UP (ref 1.8–2.4)
MAGNESIUM SERPL-MCNC: 2.4 MG/DL — SIGNIFICANT CHANGE UP (ref 1.8–2.4)
MCHC RBC-ENTMCNC: 31.6 PG — HIGH (ref 27–31)
MCHC RBC-ENTMCNC: 31.6 PG — HIGH (ref 27–31)
MCHC RBC-ENTMCNC: 33.2 G/DL — SIGNIFICANT CHANGE UP (ref 32–37)
MCHC RBC-ENTMCNC: 33.2 G/DL — SIGNIFICANT CHANGE UP (ref 32–37)
MCV RBC AUTO: 94.9 FL — SIGNIFICANT CHANGE UP (ref 81–99)
MCV RBC AUTO: 94.9 FL — SIGNIFICANT CHANGE UP (ref 81–99)
MONOCYTES # BLD AUTO: 0.93 K/UL — HIGH (ref 0.1–0.6)
MONOCYTES # BLD AUTO: 0.93 K/UL — HIGH (ref 0.1–0.6)
MONOCYTES NFR BLD AUTO: 7.9 % — SIGNIFICANT CHANGE UP (ref 1.7–9.3)
MONOCYTES NFR BLD AUTO: 7.9 % — SIGNIFICANT CHANGE UP (ref 1.7–9.3)
NEUTROPHILS # BLD AUTO: 7.05 K/UL — HIGH (ref 1.4–6.5)
NEUTROPHILS # BLD AUTO: 7.05 K/UL — HIGH (ref 1.4–6.5)
NEUTROPHILS NFR BLD AUTO: 59.9 % — SIGNIFICANT CHANGE UP (ref 42.2–75.2)
NEUTROPHILS NFR BLD AUTO: 59.9 % — SIGNIFICANT CHANGE UP (ref 42.2–75.2)
NRBC # BLD: 0 /100 WBCS — SIGNIFICANT CHANGE UP (ref 0–0)
NRBC # BLD: 0 /100 WBCS — SIGNIFICANT CHANGE UP (ref 0–0)
PLATELET # BLD AUTO: 288 K/UL — SIGNIFICANT CHANGE UP (ref 130–400)
PLATELET # BLD AUTO: 288 K/UL — SIGNIFICANT CHANGE UP (ref 130–400)
PMV BLD: 9.1 FL — SIGNIFICANT CHANGE UP (ref 7.4–10.4)
PMV BLD: 9.1 FL — SIGNIFICANT CHANGE UP (ref 7.4–10.4)
POTASSIUM SERPL-MCNC: 4.4 MMOL/L — SIGNIFICANT CHANGE UP (ref 3.5–5)
POTASSIUM SERPL-MCNC: 4.4 MMOL/L — SIGNIFICANT CHANGE UP (ref 3.5–5)
POTASSIUM SERPL-SCNC: 4.4 MMOL/L — SIGNIFICANT CHANGE UP (ref 3.5–5)
POTASSIUM SERPL-SCNC: 4.4 MMOL/L — SIGNIFICANT CHANGE UP (ref 3.5–5)
PROT SERPL-MCNC: 6.1 G/DL — SIGNIFICANT CHANGE UP (ref 6–8)
PROT SERPL-MCNC: 6.1 G/DL — SIGNIFICANT CHANGE UP (ref 6–8)
PROTHROM AB SERPL-ACNC: 13.2 SEC — HIGH (ref 9.95–12.87)
PROTHROM AB SERPL-ACNC: 13.2 SEC — HIGH (ref 9.95–12.87)
RBC # BLD: 4.12 M/UL — LOW (ref 4.2–5.4)
RBC # BLD: 4.12 M/UL — LOW (ref 4.2–5.4)
RBC # FLD: 13.3 % — SIGNIFICANT CHANGE UP (ref 11.5–14.5)
RBC # FLD: 13.3 % — SIGNIFICANT CHANGE UP (ref 11.5–14.5)
SODIUM SERPL-SCNC: 140 MMOL/L — SIGNIFICANT CHANGE UP (ref 135–146)
SODIUM SERPL-SCNC: 140 MMOL/L — SIGNIFICANT CHANGE UP (ref 135–146)
WBC # BLD: 11.75 K/UL — HIGH (ref 4.8–10.8)
WBC # BLD: 11.75 K/UL — HIGH (ref 4.8–10.8)
WBC # FLD AUTO: 11.75 K/UL — HIGH (ref 4.8–10.8)
WBC # FLD AUTO: 11.75 K/UL — HIGH (ref 4.8–10.8)

## 2023-12-06 PROCEDURE — 71045 X-RAY EXAM CHEST 1 VIEW: CPT | Mod: 26

## 2023-12-06 PROCEDURE — 33208 INSRT HEART PM ATRIAL & VENT: CPT | Mod: KX

## 2023-12-06 PROCEDURE — 93010 ELECTROCARDIOGRAM REPORT: CPT

## 2023-12-06 PROCEDURE — 99291 CRITICAL CARE FIRST HOUR: CPT

## 2023-12-06 PROCEDURE — 71045 X-RAY EXAM CHEST 1 VIEW: CPT | Mod: 26,77

## 2023-12-06 RX ORDER — FUROSEMIDE 40 MG
40 TABLET ORAL
Refills: 0 | Status: DISCONTINUED | OUTPATIENT
Start: 2023-12-07 | End: 2023-12-07

## 2023-12-06 RX ORDER — VANCOMYCIN HCL 1 G
1000 VIAL (EA) INTRAVENOUS ONCE
Refills: 0 | Status: COMPLETED | OUTPATIENT
Start: 2023-12-06 | End: 2023-12-06

## 2023-12-06 RX ORDER — VANCOMYCIN HCL 1 G
1000 VIAL (EA) INTRAVENOUS EVERY 12 HOURS
Refills: 0 | Status: COMPLETED | OUTPATIENT
Start: 2023-12-07 | End: 2023-12-07

## 2023-12-06 RX ADMIN — CHLORHEXIDINE GLUCONATE 1 APPLICATION(S): 213 SOLUTION TOPICAL at 12:37

## 2023-12-06 RX ADMIN — ATORVASTATIN CALCIUM 80 MILLIGRAM(S): 80 TABLET, FILM COATED ORAL at 22:13

## 2023-12-06 RX ADMIN — Medication 250 MILLIGRAM(S): at 16:45

## 2023-12-06 RX ADMIN — Medication 60 MILLIGRAM(S): at 07:01

## 2023-12-06 RX ADMIN — Medication 10 MILLIGRAM(S): at 07:01

## 2023-12-06 RX ADMIN — Medication 10 MILLIGRAM(S): at 22:13

## 2023-12-06 RX ADMIN — Medication 250 MILLIGRAM(S): at 18:00

## 2023-12-06 RX ADMIN — AMLODIPINE BESYLATE 10 MILLIGRAM(S): 2.5 TABLET ORAL at 07:01

## 2023-12-06 NOTE — PROGRESS NOTE ADULT - SUBJECTIVE AND OBJECTIVE BOX
24H events:    Patient is a 62y old Female who presents with a chief complaint of Shortness of breath (05 Dec 2023 14:48)    Primary diagnosis of Complete heart block    Today is hospital day 3d. This morning patient was seen and examined at bedside, resting comfortably in bed.    No acute or major events overnight.    Code Status: Full    PAST MEDICAL & SURGICAL HISTORY  Diabetes mellitus    Bipolar disorder in partial remission, most recent episode unspecified type  Last episodes six years ago    History of hysterectomy for malignancy    ALLERGIES:  penicillin (Other)    MEDICATIONS:  STANDING MEDICATIONS  amLODIPine   Tablet 10 milliGRAM(s) Oral daily  atorvastatin 80 milliGRAM(s) Oral at bedtime  cefTRIAXone   IVPB 1000 milliGRAM(s) IV Intermittent every 24 hours  chlorhexidine 2% Cloths 1 Application(s) Topical daily  furosemide   Injectable 60 milliGRAM(s) IV Push every 12 hours  hydrALAZINE 10 milliGRAM(s) Oral every 8 hours  nitroglycerin  Infusion 5 MICROgram(s)/Min IV Continuous <Continuous>    PRN MEDICATIONS  acetaminophen     Tablet .. 650 milliGRAM(s) Oral every 6 hours PRN  ALPRAZolam 0.25 milliGRAM(s) Oral every 8 hours PRN  aluminum hydroxide/magnesium hydroxide/simethicone Suspension 30 milliLiter(s) Oral every 4 hours PRN  melatonin 3 milliGRAM(s) Oral at bedtime PRN  ondansetron Injectable 4 milliGRAM(s) IV Push every 8 hours PRN    VITALS:   T(F): 97.6  HR: 34  BP: 141/67  RR: 19  SpO2: 97%    PHYSICAL EXAM:  GENERAL:   ( x ) NAD, lying in bed comfortably     (  ) obtunded     (  ) lethargic     (  ) somnolent    HEAD:   ( x ) Atraumatic     (  ) hematoma     (  ) laceration (specify location:       )     NECK:  ( x ) Supple     (  ) neck stiffness     (  ) nuchal rigidity     (  )  no JVD     (  ) JVD present ( -- cm)    HEART:  Rate -->     (  ) normal rate     ( x ) bradycardic     (  ) tachycardic  Rhythm -->     ( x ) regular     (  ) regularly irregular     (  ) irregularly irregular  Murmurs -->     ( x ) normal s1s2     (  ) systolic murmur     (  ) diastolic murmur     (  ) continuous murmur      (  ) S3 present     (  ) S4 present    LUNGS:   ( x ) Unlabored respirations     (  ) tachypnea  ( x ) B/L air entry     (  ) decreased breath sounds in:  (location     )    ( x ) no adventitious sound     (  ) crackles     (  ) wheezing      (  ) rhonchi      (specify location:       )  (  ) chest wall tenderness (specify location:       )    ABDOMEN:   ( x ) Soft     (  ) tense   |   ( x ) nondistended     (  ) distended   |   (  ) +BS     (  ) hypoactive bowel sounds     (  ) hyperactive bowel sounds  ( x ) nontender     (  ) RUQ tenderness     (  ) RLQ tenderness     (  ) LLQ tenderness     (  ) epigastric tenderness     (  ) diffuse tenderness  (  ) Splenomegaly      (  ) Hepatomegaly      (  ) Jaundice     (  ) ecchymosis     EXTREMITIES:  ( x ) Normal     (  ) Rash     (  ) ecchymosis     (  ) varicose veins      (  ) pitting edema     (  ) non-pitting edema   (  ) ulceration     (  ) gangrene:     (location:     )    NERVOUS SYSTEM:    ( x ) A&Ox3     (  ) confused     (  ) lethargic    SKIN:   ( x ) No rashes or lesions     (  ) maculopapular rash     (  ) pustules     (  ) vesicles     (  ) ulcer     (  ) ecchymosis     (specify location:     )    LABS:                        13.0   11.75 )-----------( 288      ( 06 Dec 2023 05:19 )             39.1     12-06    140  |  106  |  40<H>  ----------------------------<  162<H>  4.4   |  24  |  1.2    Ca    8.8      06 Dec 2023 05:19  Phos  4.5     12-05  Mg     2.4     12-06    TPro  6.1  /  Alb  3.5  /  TBili  0.4  /  DBili  x   /  AST  8   /  ALT  11  /  AlkPhos  79  12-06    PT/INR - ( 06 Dec 2023 05:19 )   PT: 13.20 sec;   INR: 1.16 ratio         PTT - ( 06 Dec 2023 05:19 )  PTT:30.9 sec  Urinalysis Basic - ( 06 Dec 2023 05:19 )    Color: x / Appearance: x / SG: x / pH: x  Gluc: 162 mg/dL / Ketone: x  / Bili: x / Urobili: x   Blood: x / Protein: x / Nitrite: x   Leuk Esterase: x / RBC: x / WBC x   Sq Epi: x / Non Sq Epi: x / Bacteria: x

## 2023-12-06 NOTE — CHART NOTE - NSCHARTNOTEFT_GEN_A_CORE
PACU ANESTHESIA ADMISSION NOTE      Procedure:   Post op diagnosis:      ____  Intubated  TV:______       Rate: ______      FiO2: ______    __x__  Patent Airway    __x__  Full return of protective reflexes    __x__  Full recovery from anesthesia / back to baseline     Vitals:   T:   98        R:    14              BP:  145/99                Sat: 94                  P: 72      Mental Status:  _x___ Awake   ___x__ Alert   _____ Drowsy   _____ Sedated    Nausea/Vomiting:  __x__ NO  ______Yes,   See Post - Op Orders          Pain Scale (0-10):  ___0__    Treatment: ____ None    ___x_ See Post - Op/PCA Orders    Post - Operative Fluids:   ____ Oral   __x__ See Post - Op Orders    Plan: Discharge:   ____Home       _____Floor     _x____Critical Care    _____  Other:_________________    Comments:

## 2023-12-06 NOTE — PHYSICAL THERAPY INITIAL EVALUATION ADULT - SPECIFY REASON(S)
Chart reviewed. Per MD order, may not begin PT until 12/7. PT to follow on/after 12/7 as medically appropriate.

## 2023-12-06 NOTE — PROGRESS NOTE ADULT - SUBJECTIVE AND OBJECTIVE BOX
Electrophysiology Brief Post-Op Note    PRE-OP DIAGNOSIS: CHB    POST-OP DIAGNOSIS: CHB    PROCEDURE: PPM    Vendor Representative was present for clinical support.    Physician: Antione  Assistant: None    ESTIMATED BLOOD LOSS:  20      mL    ANESTHESIA TYPE:  [  ]General Anesthesia  [X  ] Sedation  [ X ] Local/Regional    CONDITION  [  ] Critical  [  ] Serious  [  ]Fair  [X]Good      SPECIMENS REMOVED (IF APPLICABLE):  none    IMPLANTS (IF APPLICABLE)  PPM    FINDINGS: none    COMPLICATIONS: none     PLAN OF CARE    - Vancomyocin 1g IV q12 h  - Chest X-ray PA/Lat now and tomorrow am  - Device interrogation tomorrow in am  - DC all heparin produces and lovenox  - No anticoagulation unless recomended by EP attending

## 2023-12-06 NOTE — ASU PREOP CHECKLIST - WEIGHT IN LBS
Left message for patient to call office back to discuss results below.     ----- Message from Indu Solis MD sent at 2/7/2023 10:39 AM CST -----  Please notify the patient of ongoing high calcium level. Please ask her if she is still taking calcium or vit D supplements. If she is then she must stop them. If she is not then I recommend a hypercalcemia work-up by her PCP or endocrinology.     Renal function appears to slowly be declining. Can you ask her if she sees a nephrologist. If not, then we will send a referral for her to get established with someone.            [phone]

## 2023-12-06 NOTE — PROGRESS NOTE ADULT - ASSESSMENT
62y F hx of manic depressive disorder, type II diabetes not on any treatment, active smoker presenting for evaluation of shortness of breath and fevers progressive over the past 2 days. Per EMS vitals were normal and patient was placed on 2L NC for comfort. Patient arrived to the ED bradycardic with HR of 35 and  hypertensive. EKG showed complete heart block. Patient denies any dizziness. Patient endorses having constant  midsternal non-radiating chest pressure 8/10 in intensity since last 2 days. Patient denies any alleviating or exaggerating factors. Transcutaneous pacer pads were placed in the ED. Patient is admitted to CCU for further management.    IMPRESSION:    #Bradycardia secondary to complete heart block   #HTN Emergency   #Active Smoker   #HO Schizophrenia   #HO HTN   #HO Acute R thalamic infarct 2/2 arterial atherosclerosis in 2021  #HO DM       PLAN:     CNS  - Avoid CNS depressants  - Xanax 0.25 PRN for anxiety    HEENT: Oral care.     PULMONARY:  HOB @ 45 degree. RVP negative; f/u procal    CARDIOVASCULAR:  - EKG Sinus rhythm with complete heart block with junctional escape  - Pacer pads in place  - Avoid AV rhoda blocking agents  - pBNP 91750  - TTE with normal EF, mild MR/TR, no sings of restrictive cardiomyopathy  - TTE 12/4: EF 65-70%. Normal global left ventricular systolic function  - Lasix IV 60 mg BID  - Cardiac MRI 12/5: Incomplete exam. Patient requested early termination of the examination. Visually normal biventricular size and function. (Estimated LVEF 63%)  - Plan for PPM today 12/6  - c/w Nitroglycerin drip for BP control  - c/w Hydralazine and Amlodipine PO for BP control    GI: GI prophylaxis. DASH/TLC; NPO after midnight                          RENAL:  F/u lytes. Correct as needed. accurate I/O    INFECTIOUS DISEASE: Syphilis screen negative; Lyme antibodies negative    HEMATOLOGICAL:  DVT prophylaxis.     ENDOCRINE:  - Follow up FS. Insulin protocol if needed  - HbA1c 6.6  - Lipid panel abnormal: Cholesterol 266, Triglycerides 218, HDL 29, Non HDL Cholesterol 237,   - c/w Atorvastatin 80mg QHS   62y F hx of manic depressive disorder, type II diabetes not on any treatment, active smoker presenting for evaluation of shortness of breath and fevers progressive over the past 2 days. Per EMS vitals were normal and patient was placed on 2L NC for comfort. Patient arrived to the ED bradycardic with HR of 35 and  hypertensive. EKG showed complete heart block. Patient denies any dizziness. Patient endorses having constant  midsternal non-radiating chest pressure 8/10 in intensity since last 2 days. Patient denies any alleviating or exaggerating factors. Transcutaneous pacer pads were placed in the ED. Patient is admitted to CCU for further management.    IMPRESSION:    #Bradycardia secondary to complete heart block   #HTN Emergency   #Active Smoker   #HO Schizophrenia   #HO HTN   #HO Acute R thalamic infarct 2/2 arterial atherosclerosis in 2021  #HO DM       PLAN:     CNS  - Avoid CNS depressants  - Xanax 0.25 PRN for anxiety    HEENT: Oral care.     PULMONARY:  HOB @ 45 degree. RVP negative; f/u procal    CARDIOVASCULAR:  - EKG Sinus rhythm with complete heart block with junctional escape  - Pacer pads in place  - Avoid AV rhoda blocking agents  - pBNP 74494  - TTE with normal EF, mild MR/TR, no sings of restrictive cardiomyopathy  - TTE 12/4: EF 65-70%. Normal global left ventricular systolic function  - Lasix IV 60 mg BID  - Cardiac MRI 12/5: Incomplete exam. Patient requested early termination of the examination. Visually normal biventricular size and function. (Estimated LVEF 63%)  - Plan for PPM today 12/6  - c/w Nitroglycerin drip for BP control  - c/w Hydralazine and Amlodipine PO for BP control    GI: GI prophylaxis. DASH/TLC; NPO after midnight                          RENAL:  F/u lytes. Correct as needed. accurate I/O    INFECTIOUS DISEASE: Syphilis screen negative; Lyme antibodies negative    HEMATOLOGICAL:  DVT prophylaxis.     ENDOCRINE:  - Follow up FS. Insulin protocol if needed  - HbA1c 6.6  - Lipid panel abnormal: Cholesterol 266, Triglycerides 218, HDL 29, Non HDL Cholesterol 237,   - c/w Atorvastatin 80mg QHS   62y F hx of manic depressive disorder, type II diabetes not on any treatment, active smoker presenting for evaluation of shortness of breath and fevers progressive over the past 2 days. Per EMS vitals were normal and patient was placed on 2L NC for comfort. Patient arrived to the ED bradycardic with HR of 35 and  hypertensive. EKG showed complete heart block. Patient denies any dizziness. Patient endorses having constant  midsternal non-radiating chest pressure 8/10 in intensity since last 2 days. Patient denies any alleviating or exaggerating factors. Transcutaneous pacer pads were placed in the ED. Patient is admitted to CCU for further management.    IMPRESSION:    #Bradycardia secondary to complete heart block   #HTN Emergency   #Active Smoker   #HO Schizophrenia   #HO HTN   #HO Acute R thalamic infarct 2/2 arterial atherosclerosis in 2021  #HO DM       PLAN:     CNS  - Avoid CNS depressants  - Xanax 0.25 PRN for anxiety    HEENT: Oral care.     PULMONARY:  HOB @ 45 degree. RVP negative; f/u procal    CARDIOVASCULAR:  - EKG Sinus rhythm with complete heart block with junctional escape  - Pacer pads in place  - Avoid AV rhoda blocking agents  - pBNP 95718  - TTE with normal EF, mild MR/TR, no sings of restrictive cardiomyopathy  - TTE 12/4: EF 65-70%. Normal global left ventricular systolic function  - Lasix IV 60 mg BID  - Cardiac MRI 12/5: Incomplete exam. Patient requested early termination of the examination. Visually normal biventricular size and function. (Estimated LVEF 63%)  - Plan for PPM today 12/6  - c/w Nitroglycerin drip for BP control  - c/w Hydralazine and Amlodipine PO for BP control  - Will start Lasix 40mg PO tomorrow 12/7    GI: GI prophylaxis. DASH/TLC; NPO after midnight        RENAL:  F/u lytes. Correct as needed. accurate I/O    INFECTIOUS DISEASE: Syphilis screen negative; Lyme antibodies negative    HEMATOLOGICAL:  DVT prophylaxis.     ENDOCRINE:  - Follow up FS. Insulin protocol if needed  - HbA1c 6.6  - Lipid panel abnormal: Cholesterol 266, Triglycerides 218, HDL 29, Non HDL Cholesterol 237,   - c/w Atorvastatin 80mg QHS   62y F hx of manic depressive disorder, type II diabetes not on any treatment, active smoker presenting for evaluation of shortness of breath and fevers progressive over the past 2 days. Per EMS vitals were normal and patient was placed on 2L NC for comfort. Patient arrived to the ED bradycardic with HR of 35 and  hypertensive. EKG showed complete heart block. Patient denies any dizziness. Patient endorses having constant  midsternal non-radiating chest pressure 8/10 in intensity since last 2 days. Patient denies any alleviating or exaggerating factors. Transcutaneous pacer pads were placed in the ED. Patient is admitted to CCU for further management.    IMPRESSION:    #Bradycardia secondary to complete heart block   #HTN Emergency   #Active Smoker   #HO Schizophrenia   #HO HTN   #HO Acute R thalamic infarct 2/2 arterial atherosclerosis in 2021  #HO DM       PLAN:     CNS  - Avoid CNS depressants  - Xanax 0.25 PRN for anxiety    HEENT: Oral care.     PULMONARY:  HOB @ 45 degree. RVP negative; f/u procal    CARDIOVASCULAR:  - EKG Sinus rhythm with complete heart block with junctional escape  - Pacer pads in place  - Avoid AV rhoda blocking agents  - pBNP 17278  - TTE with normal EF, mild MR/TR, no sings of restrictive cardiomyopathy  - TTE 12/4: EF 65-70%. Normal global left ventricular systolic function  - Lasix IV 60 mg BID  - Cardiac MRI 12/5: Incomplete exam. Patient requested early termination of the examination. Visually normal biventricular size and function. (Estimated LVEF 63%)  - Plan for PPM today 12/6  - c/w Nitroglycerin drip for BP control  - c/w Hydralazine and Amlodipine PO for BP control  - Will start Lasix 40mg PO tomorrow 12/7    GI: GI prophylaxis. DASH/TLC; NPO after midnight        RENAL:  F/u lytes. Correct as needed. accurate I/O    INFECTIOUS DISEASE: Syphilis screen negative; Lyme antibodies negative    HEMATOLOGICAL:  DVT prophylaxis.     ENDOCRINE:  - Follow up FS. Insulin protocol if needed  - HbA1c 6.6  - Lipid panel abnormal: Cholesterol 266, Triglycerides 218, HDL 29, Non HDL Cholesterol 237,   - c/w Atorvastatin 80mg QHS   62y F hx of manic depressive disorder, type II diabetes not on any treatment, active smoker presenting for evaluation of shortness of breath and fevers progressive over the past 2 days. Per EMS vitals were normal and patient was placed on 2L NC for comfort. Patient arrived to the ED bradycardic with HR of 35 and  hypertensive. EKG showed complete heart block. Patient denies any dizziness. Patient endorses having constant  midsternal non-radiating chest pressure 8/10 in intensity since last 2 days. Patient denies any alleviating or exaggerating factors. Transcutaneous pacer pads were placed in the ED. Patient is admitted to CCU for further management.    IMPRESSION:    #Bradycardia secondary to complete heart block   #HTN Emergency   #Active Smoker   #HO Schizophrenia   #HO HTN   #HO Acute R thalamic infarct 2/2 arterial atherosclerosis in 2021  #HO DM       PLAN:     CNS  - Avoid CNS depressants  - Xanax 0.25 PRN for anxiety    HEENT: Oral care.     PULMONARY:  HOB @ 45 degree. RVP negative    CARDIOVASCULAR:  - EKG Sinus rhythm with complete heart block with junctional escape  - Pacer pads in place  - Avoid AV rhoda blocking agents  - pBNP 47706  - TTE with normal EF, mild MR/TR, no sings of restrictive cardiomyopathy  - TTE 12/4: EF 65-70%. Normal global left ventricular systolic function  - Lasix IV 60 mg BID transitioned to oral Lasix 40 mg IV BID starting tomorrow 12/7  - Cardiac MRI 12/5: Incomplete exam. Patient requested early termination of the examination. Visually normal biventricular size and function. (Estimated LVEF 63%)  - Plan for PPM today 12/6  - c/w Nitroglycerin drip for BP control, wean off after PPM placement  - c/w Hydralazine and Amlodipine PO for BP control    GI: GI prophylaxis. DASH/TLC; NPO after midnight        RENAL:  F/u lytes. Correct as needed. accurate I/O    INFECTIOUS DISEASE: Syphilis screen negative; Lyme antibodies negative    HEMATOLOGICAL:  DVT prophylaxis.     ENDOCRINE:  - Follow up FS. Insulin protocol if needed  - HbA1c 6.6  - Lipid panel abnormal: Cholesterol 266, Triglycerides 218, HDL 29, Non HDL Cholesterol 237,   - c/w Atorvastatin 80mg QHS   62y F hx of manic depressive disorder, type II diabetes not on any treatment, active smoker presenting for evaluation of shortness of breath and fevers progressive over the past 2 days. Per EMS vitals were normal and patient was placed on 2L NC for comfort. Patient arrived to the ED bradycardic with HR of 35 and  hypertensive. EKG showed complete heart block. Patient denies any dizziness. Patient endorses having constant  midsternal non-radiating chest pressure 8/10 in intensity since last 2 days. Patient denies any alleviating or exaggerating factors. Transcutaneous pacer pads were placed in the ED. Patient is admitted to CCU for further management.    IMPRESSION:    #Bradycardia secondary to complete heart block   #HTN Emergency   #Active Smoker   #HO Schizophrenia   #HO HTN   #HO Acute R thalamic infarct 2/2 arterial atherosclerosis in 2021  #HO DM       PLAN:     CNS  - Avoid CNS depressants  - Xanax 0.25 PRN for anxiety    HEENT: Oral care.     PULMONARY:  HOB @ 45 degree. RVP negative    CARDIOVASCULAR:  - EKG Sinus rhythm with complete heart block with junctional escape  - Pacer pads in place  - Avoid AV rhoda blocking agents  - pBNP 82445  - TTE with normal EF, mild MR/TR, no sings of restrictive cardiomyopathy  - TTE 12/4: EF 65-70%. Normal global left ventricular systolic function  - Lasix IV 60 mg BID transitioned to oral Lasix 40 mg IV BID starting tomorrow 12/7  - Cardiac MRI 12/5: Incomplete exam. Patient requested early termination of the examination. Visually normal biventricular size and function. (Estimated LVEF 63%)  - Plan for PPM today 12/6  - c/w Nitroglycerin drip for BP control, wean off after PPM placement  - c/w Hydralazine and Amlodipine PO for BP control    GI: GI prophylaxis. DASH/TLC; NPO after midnight        RENAL:  F/u lytes. Correct as needed. accurate I/O    INFECTIOUS DISEASE: Syphilis screen negative; Lyme antibodies negative    HEMATOLOGICAL:  DVT prophylaxis.     ENDOCRINE:  - Follow up FS. Insulin protocol if needed  - HbA1c 6.6  - Lipid panel abnormal: Cholesterol 266, Triglycerides 218, HDL 29, Non HDL Cholesterol 237,   - c/w Atorvastatin 80mg QHS

## 2023-12-06 NOTE — PRE-ANESTHESIA EVALUATION ADULT - HEIGHT IN INCHES
RN refill protocol failed as patient with elevated B/P on 7/8/17. However, patient was seen in the rapid clinic at that time, and per office visit notes, was in pain. Patient's blood pressure previously was in goal. Patient is also noted to be seeing PCP in the office on 3/30/18 so B/P will be reviewed at that time. Writer will refill rx as requested for a limited supply at this time to get him through his office visit as noted.    Prescription refilled per RN Medication Refill Policy..................Ezra Berry 3/14/2018 10:18 AM     6

## 2023-12-07 ENCOUNTER — TRANSCRIPTION ENCOUNTER (OUTPATIENT)
Age: 63
End: 2023-12-07

## 2023-12-07 VITALS
TEMPERATURE: 98 F | RESPIRATION RATE: 19 BRPM | SYSTOLIC BLOOD PRESSURE: 166 MMHG | DIASTOLIC BLOOD PRESSURE: 74 MMHG | HEART RATE: 67 BPM

## 2023-12-07 LAB
ALBUMIN SERPL ELPH-MCNC: 3.5 G/DL — SIGNIFICANT CHANGE UP (ref 3.5–5.2)
ALBUMIN SERPL ELPH-MCNC: 3.5 G/DL — SIGNIFICANT CHANGE UP (ref 3.5–5.2)
ALP SERPL-CCNC: 83 U/L — SIGNIFICANT CHANGE UP (ref 30–115)
ALP SERPL-CCNC: 83 U/L — SIGNIFICANT CHANGE UP (ref 30–115)
ALT FLD-CCNC: 14 U/L — SIGNIFICANT CHANGE UP (ref 0–41)
ALT FLD-CCNC: 14 U/L — SIGNIFICANT CHANGE UP (ref 0–41)
ANION GAP SERPL CALC-SCNC: 11 MMOL/L — SIGNIFICANT CHANGE UP (ref 7–14)
ANION GAP SERPL CALC-SCNC: 11 MMOL/L — SIGNIFICANT CHANGE UP (ref 7–14)
AST SERPL-CCNC: 15 U/L — SIGNIFICANT CHANGE UP (ref 0–41)
AST SERPL-CCNC: 15 U/L — SIGNIFICANT CHANGE UP (ref 0–41)
BASOPHILS # BLD AUTO: 0.08 K/UL — SIGNIFICANT CHANGE UP (ref 0–0.2)
BASOPHILS # BLD AUTO: 0.08 K/UL — SIGNIFICANT CHANGE UP (ref 0–0.2)
BASOPHILS NFR BLD AUTO: 0.6 % — SIGNIFICANT CHANGE UP (ref 0–1)
BASOPHILS NFR BLD AUTO: 0.6 % — SIGNIFICANT CHANGE UP (ref 0–1)
BILIRUB SERPL-MCNC: 0.3 MG/DL — SIGNIFICANT CHANGE UP (ref 0.2–1.2)
BILIRUB SERPL-MCNC: 0.3 MG/DL — SIGNIFICANT CHANGE UP (ref 0.2–1.2)
BUN SERPL-MCNC: 37 MG/DL — HIGH (ref 10–20)
BUN SERPL-MCNC: 37 MG/DL — HIGH (ref 10–20)
CALCIUM SERPL-MCNC: 8.9 MG/DL — SIGNIFICANT CHANGE UP (ref 8.4–10.5)
CALCIUM SERPL-MCNC: 8.9 MG/DL — SIGNIFICANT CHANGE UP (ref 8.4–10.5)
CHLORIDE SERPL-SCNC: 104 MMOL/L — SIGNIFICANT CHANGE UP (ref 98–110)
CHLORIDE SERPL-SCNC: 104 MMOL/L — SIGNIFICANT CHANGE UP (ref 98–110)
CO2 SERPL-SCNC: 24 MMOL/L — SIGNIFICANT CHANGE UP (ref 17–32)
CO2 SERPL-SCNC: 24 MMOL/L — SIGNIFICANT CHANGE UP (ref 17–32)
CREAT SERPL-MCNC: 0.9 MG/DL — SIGNIFICANT CHANGE UP (ref 0.7–1.5)
CREAT SERPL-MCNC: 0.9 MG/DL — SIGNIFICANT CHANGE UP (ref 0.7–1.5)
EGFR: 72 ML/MIN/1.73M2 — SIGNIFICANT CHANGE UP
EGFR: 72 ML/MIN/1.73M2 — SIGNIFICANT CHANGE UP
EOSINOPHIL # BLD AUTO: 0.42 K/UL — SIGNIFICANT CHANGE UP (ref 0–0.7)
EOSINOPHIL # BLD AUTO: 0.42 K/UL — SIGNIFICANT CHANGE UP (ref 0–0.7)
EOSINOPHIL NFR BLD AUTO: 3.2 % — SIGNIFICANT CHANGE UP (ref 0–8)
EOSINOPHIL NFR BLD AUTO: 3.2 % — SIGNIFICANT CHANGE UP (ref 0–8)
GLUCOSE BLDC GLUCOMTR-MCNC: 203 MG/DL — HIGH (ref 70–99)
GLUCOSE BLDC GLUCOMTR-MCNC: 203 MG/DL — HIGH (ref 70–99)
GLUCOSE BLDC GLUCOMTR-MCNC: 231 MG/DL — HIGH (ref 70–99)
GLUCOSE BLDC GLUCOMTR-MCNC: 231 MG/DL — HIGH (ref 70–99)
GLUCOSE SERPL-MCNC: 217 MG/DL — HIGH (ref 70–99)
GLUCOSE SERPL-MCNC: 217 MG/DL — HIGH (ref 70–99)
HCT VFR BLD CALC: 39.4 % — SIGNIFICANT CHANGE UP (ref 37–47)
HCT VFR BLD CALC: 39.4 % — SIGNIFICANT CHANGE UP (ref 37–47)
HGB BLD-MCNC: 13 G/DL — SIGNIFICANT CHANGE UP (ref 12–16)
HGB BLD-MCNC: 13 G/DL — SIGNIFICANT CHANGE UP (ref 12–16)
IMM GRANULOCYTES NFR BLD AUTO: 0.4 % — HIGH (ref 0.1–0.3)
IMM GRANULOCYTES NFR BLD AUTO: 0.4 % — HIGH (ref 0.1–0.3)
LYMPHOCYTES # BLD AUTO: 1.82 K/UL — SIGNIFICANT CHANGE UP (ref 1.2–3.4)
LYMPHOCYTES # BLD AUTO: 1.82 K/UL — SIGNIFICANT CHANGE UP (ref 1.2–3.4)
LYMPHOCYTES # BLD AUTO: 13.9 % — LOW (ref 20.5–51.1)
LYMPHOCYTES # BLD AUTO: 13.9 % — LOW (ref 20.5–51.1)
MAGNESIUM SERPL-MCNC: 2.4 MG/DL — SIGNIFICANT CHANGE UP (ref 1.8–2.4)
MAGNESIUM SERPL-MCNC: 2.4 MG/DL — SIGNIFICANT CHANGE UP (ref 1.8–2.4)
MCHC RBC-ENTMCNC: 31.7 PG — HIGH (ref 27–31)
MCHC RBC-ENTMCNC: 31.7 PG — HIGH (ref 27–31)
MCHC RBC-ENTMCNC: 33 G/DL — SIGNIFICANT CHANGE UP (ref 32–37)
MCHC RBC-ENTMCNC: 33 G/DL — SIGNIFICANT CHANGE UP (ref 32–37)
MCV RBC AUTO: 96.1 FL — SIGNIFICANT CHANGE UP (ref 81–99)
MCV RBC AUTO: 96.1 FL — SIGNIFICANT CHANGE UP (ref 81–99)
MONOCYTES # BLD AUTO: 0.7 K/UL — HIGH (ref 0.1–0.6)
MONOCYTES # BLD AUTO: 0.7 K/UL — HIGH (ref 0.1–0.6)
MONOCYTES NFR BLD AUTO: 5.3 % — SIGNIFICANT CHANGE UP (ref 1.7–9.3)
MONOCYTES NFR BLD AUTO: 5.3 % — SIGNIFICANT CHANGE UP (ref 1.7–9.3)
NEUTROPHILS # BLD AUTO: 10.06 K/UL — HIGH (ref 1.4–6.5)
NEUTROPHILS # BLD AUTO: 10.06 K/UL — HIGH (ref 1.4–6.5)
NEUTROPHILS NFR BLD AUTO: 76.6 % — HIGH (ref 42.2–75.2)
NEUTROPHILS NFR BLD AUTO: 76.6 % — HIGH (ref 42.2–75.2)
NRBC # BLD: 0 /100 WBCS — SIGNIFICANT CHANGE UP (ref 0–0)
NRBC # BLD: 0 /100 WBCS — SIGNIFICANT CHANGE UP (ref 0–0)
PLATELET # BLD AUTO: 278 K/UL — SIGNIFICANT CHANGE UP (ref 130–400)
PLATELET # BLD AUTO: 278 K/UL — SIGNIFICANT CHANGE UP (ref 130–400)
PMV BLD: 9.1 FL — SIGNIFICANT CHANGE UP (ref 7.4–10.4)
PMV BLD: 9.1 FL — SIGNIFICANT CHANGE UP (ref 7.4–10.4)
POTASSIUM SERPL-MCNC: 4.6 MMOL/L — SIGNIFICANT CHANGE UP (ref 3.5–5)
POTASSIUM SERPL-MCNC: 4.6 MMOL/L — SIGNIFICANT CHANGE UP (ref 3.5–5)
POTASSIUM SERPL-SCNC: 4.6 MMOL/L — SIGNIFICANT CHANGE UP (ref 3.5–5)
POTASSIUM SERPL-SCNC: 4.6 MMOL/L — SIGNIFICANT CHANGE UP (ref 3.5–5)
PROT SERPL-MCNC: 6.1 G/DL — SIGNIFICANT CHANGE UP (ref 6–8)
PROT SERPL-MCNC: 6.1 G/DL — SIGNIFICANT CHANGE UP (ref 6–8)
RBC # BLD: 4.1 M/UL — LOW (ref 4.2–5.4)
RBC # BLD: 4.1 M/UL — LOW (ref 4.2–5.4)
RBC # FLD: 13.2 % — SIGNIFICANT CHANGE UP (ref 11.5–14.5)
RBC # FLD: 13.2 % — SIGNIFICANT CHANGE UP (ref 11.5–14.5)
SODIUM SERPL-SCNC: 139 MMOL/L — SIGNIFICANT CHANGE UP (ref 135–146)
SODIUM SERPL-SCNC: 139 MMOL/L — SIGNIFICANT CHANGE UP (ref 135–146)
WBC # BLD: 13.13 K/UL — HIGH (ref 4.8–10.8)
WBC # BLD: 13.13 K/UL — HIGH (ref 4.8–10.8)
WBC # FLD AUTO: 13.13 K/UL — HIGH (ref 4.8–10.8)
WBC # FLD AUTO: 13.13 K/UL — HIGH (ref 4.8–10.8)

## 2023-12-07 PROCEDURE — 99238 HOSP IP/OBS DSCHRG MGMT 30/<: CPT

## 2023-12-07 PROCEDURE — 93280 PM DEVICE PROGR EVAL DUAL: CPT | Mod: 26

## 2023-12-07 PROCEDURE — 93010 ELECTROCARDIOGRAM REPORT: CPT

## 2023-12-07 PROCEDURE — 36410 VNPNXR 3YR/> PHY/QHP DX/THER: CPT

## 2023-12-07 PROCEDURE — 71046 X-RAY EXAM CHEST 2 VIEWS: CPT | Mod: 26

## 2023-12-07 RX ORDER — LANOLIN ALCOHOL/MO/W.PET/CERES
1 CREAM (GRAM) TOPICAL
Refills: 0 | DISCHARGE

## 2023-12-07 RX ORDER — CEFTRIAXONE 500 MG/1
1000 INJECTION, POWDER, FOR SOLUTION INTRAMUSCULAR; INTRAVENOUS EVERY 24 HOURS
Refills: 0 | Status: COMPLETED | OUTPATIENT
Start: 2023-12-07 | End: 2023-12-07

## 2023-12-07 RX ORDER — CARVEDILOL PHOSPHATE 80 MG/1
1 CAPSULE, EXTENDED RELEASE ORAL
Qty: 60 | Refills: 0
Start: 2023-12-07 | End: 2024-01-05

## 2023-12-07 RX ORDER — CARVEDILOL PHOSPHATE 80 MG/1
3.12 CAPSULE, EXTENDED RELEASE ORAL EVERY 12 HOURS
Refills: 0 | Status: DISCONTINUED | OUTPATIENT
Start: 2023-12-07 | End: 2023-12-07

## 2023-12-07 RX ORDER — ATORVASTATIN CALCIUM 80 MG/1
1 TABLET, FILM COATED ORAL
Qty: 0 | Refills: 0 | DISCHARGE
Start: 2023-12-07

## 2023-12-07 RX ORDER — AMLODIPINE BESYLATE 2.5 MG/1
1 TABLET ORAL
Qty: 30 | Refills: 0
Start: 2023-12-07 | End: 2024-01-05

## 2023-12-07 RX ORDER — CEPHALEXIN 500 MG
1 CAPSULE ORAL
Qty: 10 | Refills: 0
Start: 2023-12-07 | End: 2023-12-11

## 2023-12-07 RX ORDER — LANOLIN ALCOHOL/MO/W.PET/CERES
1 CREAM (GRAM) TOPICAL
Qty: 0 | Refills: 0 | DISCHARGE
Start: 2023-12-07

## 2023-12-07 RX ORDER — FUROSEMIDE 40 MG
1 TABLET ORAL
Qty: 60 | Refills: 0
Start: 2023-12-07 | End: 2024-01-05

## 2023-12-07 RX ADMIN — CARVEDILOL PHOSPHATE 3.12 MILLIGRAM(S): 80 CAPSULE, EXTENDED RELEASE ORAL at 11:00

## 2023-12-07 RX ADMIN — Medication 10 MILLIGRAM(S): at 05:20

## 2023-12-07 RX ADMIN — CHLORHEXIDINE GLUCONATE 1 APPLICATION(S): 213 SOLUTION TOPICAL at 15:51

## 2023-12-07 RX ADMIN — CARVEDILOL PHOSPHATE 3.12 MILLIGRAM(S): 80 CAPSULE, EXTENDED RELEASE ORAL at 17:46

## 2023-12-07 RX ADMIN — Medication 250 MILLIGRAM(S): at 05:19

## 2023-12-07 RX ADMIN — Medication 40 MILLIGRAM(S): at 15:49

## 2023-12-07 RX ADMIN — Medication 40 MILLIGRAM(S): at 05:20

## 2023-12-07 RX ADMIN — Medication 250 MILLIGRAM(S): at 16:45

## 2023-12-07 RX ADMIN — CEFTRIAXONE 100 MILLIGRAM(S): 500 INJECTION, POWDER, FOR SOLUTION INTRAMUSCULAR; INTRAVENOUS at 12:33

## 2023-12-07 RX ADMIN — AMLODIPINE BESYLATE 10 MILLIGRAM(S): 2.5 TABLET ORAL at 05:20

## 2023-12-07 NOTE — PROGRESS NOTE ADULT - ASSESSMENT
62y F hx of manic depressive disorder, type II diabetes not on any treatment, active smoker presenting for evaluation of shortness of breath and chest tightness, found to be in CHB with narrow escape rhythm. Hemodynamically stable.  Patient s/p DC PPM implant SJM    - CXR as above  - Device interrogation done, properly working device   - Keflex 500 mg PO q12 h x 5 days    - FU in the EP office for wound check with ___NP in 1 month    No Heavy lifting >5 lbs. Do not raise your arm above shoulder level for 4-6 weeks.   No driving for 2weeks  No shower, bathtub, no wetting device site for 5 days.  Can take a shower on _Mon  _ , leave Steri-strips in place

## 2023-12-07 NOTE — DISCHARGE NOTE NURSING/CASE MANAGEMENT/SOCIAL WORK - PATIENT PORTAL LINK FT
You can access the FollowMyHealth Patient Portal offered by [facility] by registering at the following website: [email or web address] By joining [employer]’s FollowMyHealth portal, you will also be able to view your health information using other applications (apps) compatible with our system. You can access the FollowMyHealth Patient Portal offered by [facility] by registering at the following website: [email or web address]. By joining [employer]’s FollowMyHealth portal, you will also be able to view your health information using other applications (apps) compatible with our system.

## 2023-12-07 NOTE — DISCHARGE NOTE PROVIDER - NSDCMRMEDTOKEN_GEN_ALL_CORE_FT
aspirin 81 mg oral tablet, chewable: 1 tab(s) orally once a day  atorvastatin 80 mg oral tablet: 1 tab(s) orally once a day (at bedtime)  melatonin 3 mg oral tablet: 1 tab(s) orally once a day (at bedtime) As needed Insomnia  pioglitazone 30 mg oral tablet: 1 tab(s) orally once a day (in the morning)  Prolixin Decanoate 25 mg/mL injectable solution: 37.5 milligram(s) intramuscularly every 2 weeks   amLODIPine 10 mg oral tablet: 1 tab(s) orally once a day  aspirin 81 mg oral tablet, chewable: 1 tab(s) orally once a day  atorvastatin 80 mg oral tablet: 1 tab(s) orally once a day (at bedtime)  cephalexin 500 mg oral tablet: 1 tab(s) orally 2 times a day  Coreg 3.125 mg oral tablet: 1 tab(s) orally every 12 hours  Lasix 40 mg oral tablet: 1 tab(s) orally 2 times a day  melatonin 3 mg oral tablet: 1 tab(s) orally once a day (at bedtime) As needed Insomnia  pioglitazone 30 mg oral tablet: 1 tab(s) orally once a day (in the morning)  Prolixin Decanoate 25 mg/mL injectable solution: 37.5 milligram(s) intramuscularly every 2 weeks

## 2023-12-07 NOTE — DISCHARGE NOTE NURSING/CASE MANAGEMENT/SOCIAL WORK - NSDCPEFALRISK_GEN_ALL_CORE
For information on Fall & Injury Prevention, visit: [email or web address] OR  [email or web address] OR  https://www.cdc.gov/steadi/patient.html For information on Fall & Injury Prevention, visit: [email or web address] OR  [email or web address] OR  https://www.cdc.gov/steadi/patient.html

## 2023-12-07 NOTE — DISCHARGE NOTE PROVIDER - CARE PROVIDER_API CALL
Mark Talbot  Cardiac Electrophysiology  [address] [place] [address], [address]  [place] [address]  Phone: [phone]  Fax: [phone]  Follow Up Time:     Luis Joyner  Internal Medicine  [address] [place] [id number]  Phone: [phone]  Fax: [phone]  Follow Up Time:    Mark Talbot  Cardiac Electrophysiology  [address] [place] [address], [address]  [place] [address]  Phone: [phone]  Fax: [phone]  Follow Up Time:     Luis Joyner  Internal Medicine  [address] [place] [id number]  Phone: [phone]  Fax: [phone]  Follow Up Time:

## 2023-12-07 NOTE — PROGRESS NOTE ADULT - ASSESSMENT
62y F hx of manic depressive disorder, type II diabetes not on any treatment, active smoker presenting for evaluation of shortness of breath and fevers progressive over the past 2 days. Per EMS vitals were normal and patient was placed on 2L NC for comfort. Patient arrived to the ED bradycardic with HR of 35 and  hypertensive. EKG showed complete heart block. Patient denies any dizziness. Patient endorses having constant  midsternal non-radiating chest pressure 8/10 in intensity since last 2 days. Patient denies any alleviating or exaggerating factors. Transcutaneous pacer pads were placed in the ED. Patient is admitted to CCU for further management.    IMPRESSION:    #Bradycardia secondary to complete heart block   #HTN Emergency   #Active Smoker   #HO Schizophrenia   #HO HTN   #HO Acute R thalamic infarct 2/2 arterial atherosclerosis in 2021  #HO DM       PLAN:     CNS  - Avoid CNS depressants  - Xanax 0.25 PRN for anxiety    HEENT: Oral care.     PULMONARY:  HOB @ 45 degree. RVP negative    CARDIOVASCULAR:  - EKG Sinus rhythm with complete heart block with junctional escape  - Pacer pads in place  - Avoid AV rhoda blocking agents  - pBNP 60871  - TTE with normal EF, mild MR/TR, no sings of restrictive cardiomyopathy  - TTE 12/4: EF 65-70%. Normal global left ventricular systolic function  - Lasix IV 60 mg BID transitioned to oral Lasix 40 mg IV BID starting today 12/7  - Cardiac MRI 12/5: Incomplete exam. Patient requested early termination of the examination. Visually normal biventricular size and function (Estimated LVEF 63%)  - s/p PPM 12/6  - PA/Lateral Xray  - wean off Nitroglycerin drip  - c/w Hydralazine and Amlodipine PO for BP control    GI: GI prophylaxis. DASH/TLC; NPO after midnight        RENAL:  F/u lytes. Correct as needed. accurate I/O    INFECTIOUS DISEASE: Syphilis screen negative; Lyme antibodies negative    HEMATOLOGICAL:  DVT prophylaxis.     ENDOCRINE:  - Follow up FS. Insulin protocol if needed  - HbA1c 6.6  - Lipid panel abnormal: Cholesterol 266, Triglycerides 218, HDL 29, Non HDL Cholesterol 237,   - c/w Atorvastatin 80mg QHS   62y F hx of manic depressive disorder, type II diabetes not on any treatment, active smoker presenting for evaluation of shortness of breath and fevers progressive over the past 2 days. Per EMS vitals were normal and patient was placed on 2L NC for comfort. Patient arrived to the ED bradycardic with HR of 35 and  hypertensive. EKG showed complete heart block. Patient denies any dizziness. Patient endorses having constant  midsternal non-radiating chest pressure 8/10 in intensity since last 2 days. Patient denies any alleviating or exaggerating factors. Transcutaneous pacer pads were placed in the ED. Patient is admitted to CCU for further management.    IMPRESSION:    #Bradycardia secondary to complete heart block   #HTN Emergency   #Active Smoker   #HO Schizophrenia   #HO HTN   #HO Acute R thalamic infarct 2/2 arterial atherosclerosis in 2021  #HO DM       PLAN:     CNS  - Avoid CNS depressants  - Xanax 0.25 PRN for anxiety    HEENT: Oral care.     PULMONARY:  HOB @ 45 degree. RVP negative    CARDIOVASCULAR:  - EKG Sinus rhythm with complete heart block with junctional escape  - Pacer pads in place  - Avoid AV rhoda blocking agents  - pBNP 39028  - TTE with normal EF, mild MR/TR, no sings of restrictive cardiomyopathy  - TTE 12/4: EF 65-70%. Normal global left ventricular systolic function  - Lasix IV 60 mg BID transitioned to oral Lasix 40 mg IV BID starting today 12/7  - Cardiac MRI 12/5: Incomplete exam. Patient requested early termination of the examination. Visually normal biventricular size and function (Estimated LVEF 63%)  - s/p PPM 12/6  - PA/Lateral Xray  - wean off Nitroglycerin drip  - c/w Hydralazine and Amlodipine PO for BP control    GI: GI prophylaxis. DASH/TLC; NPO after midnight        RENAL:  F/u lytes. Correct as needed. accurate I/O    INFECTIOUS DISEASE: Syphilis screen negative; Lyme antibodies negative    HEMATOLOGICAL:  DVT prophylaxis.     ENDOCRINE:  - Follow up FS. Insulin protocol if needed  - HbA1c 6.6  - Lipid panel abnormal: Cholesterol 266, Triglycerides 218, HDL 29, Non HDL Cholesterol 237,   - c/w Atorvastatin 80mg QHS   62y F hx of manic depressive disorder, type II diabetes not on any treatment, active smoker presenting for evaluation of shortness of breath and fevers progressive over the past 2 days. Per EMS vitals were normal and patient was placed on 2L NC for comfort. Patient arrived to the ED bradycardic with HR of 35 and  hypertensive. EKG showed complete heart block. Patient denies any dizziness. Patient endorses having constant  midsternal non-radiating chest pressure 8/10 in intensity since last 2 days. Patient denies any alleviating or exaggerating factors. Transcutaneous pacer pads were placed in the ED. Patient is admitted to CCU for further management.    IMPRESSION:    #Bradycardia secondary to complete heart block   #HTN Emergency   #Active Smoker   #HO Schizophrenia   #HO HTN   #HO Acute R thalamic infarct 2/2 arterial atherosclerosis in 2021  #HO DM       PLAN:     CNS  - Avoid CNS depressants  - Xanax 0.25 PRN for anxiety    HEENT: Oral care.     PULMONARY:  HOB @ 45 degree. RVP negative    CARDIOVASCULAR:  - EKG Sinus rhythm with complete heart block with junctional escape  - pBNP 39887  - TTE with normal EF, mild MR/TR, no sings of restrictive cardiomyopathy  - TTE 12/4: EF 65-70%. Normal global left ventricular systolic function  - Lasix IV 60 mg BID transitioned to oral Lasix 40 mg IV BID starting 12/7  - Cardiac MRI 12/5: Incomplete exam. Patient requested early termination of the examination. Visually normal biventricular size and function (Estimated LVEF 63%)  - s/p PPM 12/6  - PA/Lateral Xray performed today - no PTX  - wean off Nitroglycerin drip  - c/w Hydralazine and Amlodipine PO for BP control, will add Carvedilol and Losartan if needed    GI: GI prophylaxis. DASH/TLC       RENAL:  F/u lytes. Correct as needed. accurate I/O    INFECTIOUS DISEASE: Syphilis screen negative; Lyme antibodies negative; on Rocephin for UTI    HEMATOLOGICAL:  DVT prophylaxis.     ENDOCRINE:  - Follow up FS. Insulin protocol if needed  - HbA1c 6.6  - Lipid panel abnormal: Cholesterol 266, Triglycerides 218, HDL 29, Non HDL Cholesterol 237,   - c/w Atorvastatin 80mg QHS   62y F hx of manic depressive disorder, type II diabetes not on any treatment, active smoker presenting for evaluation of shortness of breath and fevers progressive over the past 2 days. Per EMS vitals were normal and patient was placed on 2L NC for comfort. Patient arrived to the ED bradycardic with HR of 35 and  hypertensive. EKG showed complete heart block. Patient denies any dizziness. Patient endorses having constant  midsternal non-radiating chest pressure 8/10 in intensity since last 2 days. Patient denies any alleviating or exaggerating factors. Transcutaneous pacer pads were placed in the ED. Patient is admitted to CCU for further management.    IMPRESSION:    #Bradycardia secondary to complete heart block   #HTN Emergency   #Active Smoker   #HO Schizophrenia   #HO HTN   #HO Acute R thalamic infarct 2/2 arterial atherosclerosis in 2021  #HO DM       PLAN:     CNS  - Avoid CNS depressants  - Xanax 0.25 PRN for anxiety    HEENT: Oral care.     PULMONARY:  HOB @ 45 degree. RVP negative    CARDIOVASCULAR:  - EKG Sinus rhythm with complete heart block with junctional escape  - pBNP 53729  - TTE with normal EF, mild MR/TR, no sings of restrictive cardiomyopathy  - TTE 12/4: EF 65-70%. Normal global left ventricular systolic function  - Lasix IV 60 mg BID transitioned to oral Lasix 40 mg IV BID starting 12/7  - Cardiac MRI 12/5: Incomplete exam. Patient requested early termination of the examination. Visually normal biventricular size and function (Estimated LVEF 63%)  - s/p PPM 12/6  - PA/Lateral Xray performed today - no PTX  - wean off Nitroglycerin drip  - c/w Hydralazine and Amlodipine PO for BP control, will add Carvedilol and Losartan if needed    GI: GI prophylaxis. DASH/TLC       RENAL:  F/u lytes. Correct as needed. accurate I/O    INFECTIOUS DISEASE: Syphilis screen negative; Lyme antibodies negative; on Rocephin for UTI    HEMATOLOGICAL:  DVT prophylaxis.     ENDOCRINE:  - Follow up FS. Insulin protocol if needed  - HbA1c 6.6  - Lipid panel abnormal: Cholesterol 266, Triglycerides 218, HDL 29, Non HDL Cholesterol 237,   - c/w Atorvastatin 80mg QHS

## 2023-12-07 NOTE — DISCHARGE NOTE PROVIDER - HOSPITAL COURSE
62y F hx of manic depressive disorder, type II diabetes not on any treatment, active smoker presenting for evaluation of shortness of breath and fevers progressive over the past 2 days. Per EMS vitals were normal and patient was placed on 2L NC for comfort. Patient arrived to the ED bradycardic with HR of 35 and  hypertensive. EKG showed complete heart block. Patient denies any dizziness. Patient endorses having constant  midsternal non-radiating chest pressure  8/10 in intensity since last 2 days. Patient denies any alleviating or exaggerating factors. Transcutaneous pacer pads were placed in the ED. ED vitals: T(F): 98.9. HR: 35. BP: 219/95. RR: 19. SpO2: 95%. Labs were significant for K+ 5.9 (hemolyzed), Bun 23, Cr 1.1 (baseline 0.7 in 2021), troponin negative, pBNP 37018. VBG: lactate 3.1, pH 7.23, PCO2 47. CXR was unremarkable for acute cardiopulmonary findings. Last TTE in 2021:  EF of 69 %.  Increased LV wall thickness. Grade I diastolic dysfunction. Mild mitral annular calcification. Mild thickening of the anterior and posterior mitral valve leaflets. Mild tricuspid regurgitation. Patient is being admitted to CCU for further evaluation and management.    In CCU, patient was started on nitroglycerin drip for BP control. Hydralazine and Amlodipine PO were also added. Lasix IV 40 mg BID. TTE 12/4: EF 65-70%. Normal global left ventricular systolic function. Patient underwent cardiac MRI on 12/5, but did not complete the test. Limited image shows Visually normal biventricular size and function (Estimated LVEF 63%). Pace maker was placed by EP team on 12/6. CXR post procedure confirms appropriate placement of the pace maker. Patient will need to follow up with EP doctor/cardiologist after discharge. 62y F hx of manic depressive disorder, type II diabetes not on any treatment, active smoker presenting for evaluation of shortness of breath and fevers progressive over the past 2 days. Per EMS vitals were normal and patient was placed on 2L NC for comfort. Patient arrived to the ED bradycardic with HR of 35 and  hypertensive. EKG showed complete heart block. Patient denies any dizziness. Patient endorses having constant  midsternal non-radiating chest pressure  8/10 in intensity since last 2 days. Patient denies any alleviating or exaggerating factors. Transcutaneous pacer pads were placed in the ED. ED vitals: T(F): 98.9. HR: 35. BP: 219/95. RR: 19. SpO2: 95%. Labs were significant for K+ 5.9 (hemolyzed), Bun 23, Cr 1.1 (baseline 0.7 in 2021), troponin negative, pBNP 83329. VBG: lactate 3.1, pH 7.23, PCO2 47. CXR was unremarkable for acute cardiopulmonary findings. Last TTE in 2021:  EF of 69 %.  Increased LV wall thickness. Grade I diastolic dysfunction. Mild mitral annular calcification. Mild thickening of the anterior and posterior mitral valve leaflets. Mild tricuspid regurgitation. Patient is being admitted to CCU for further evaluation and management.    In CCU, patient was started on nitroglycerin drip for BP control. Hydralazine and Amlodipine PO were also added. Lasix IV 40 mg BID. TTE 12/4: EF 65-70%. Normal global left ventricular systolic function. Patient underwent cardiac MRI on 12/5, but did not complete the test. Limited image shows Visually normal biventricular size and function (Estimated LVEF 63%). Pace maker was placed by EP team on 12/6. CXR post procedure confirms appropriate placement of the pace maker. Patient will need to follow up with EP doctor/cardiologist after discharge.

## 2023-12-07 NOTE — DISCHARGE NOTE PROVIDER - NSDCFUSCHEDAPPT_GEN_ALL_CORE_FT
[facility]  E[facility] [address] [place] [address]  Scheduled Appointment: 01/03/2024     [facility]  E[facility] [address] [place] [address]  Scheduled Appointment: 01/03/2024

## 2023-12-07 NOTE — PROGRESS NOTE ADULT - SUBJECTIVE AND OBJECTIVE BOX
24H events:    Patient is a 62y old Female who presents with a chief complaint of Shortness of breath (06 Dec 2023 19:59)    Primary diagnosis of Complete heart block    Today is hospital day 4d. This morning patient was seen and examined at bedside, s/p PPM 12/6, resting comfortably in bed.    No acute or major events overnight.    Code Status: Full    PAST MEDICAL & SURGICAL HISTORY  Diabetes mellitus    Bipolar disorder in partial remission, most recent episode unspecified type  Last episodes six years ago    History of hysterectomy for malignancy    ALLERGIES:  penicillin (Other)    MEDICATIONS:  STANDING MEDICATIONS  amLODIPine   Tablet 10 milliGRAM(s) Oral daily  atorvastatin 80 milliGRAM(s) Oral at bedtime  cefTRIAXone   IVPB 1000 milliGRAM(s) IV Intermittent every 24 hours  chlorhexidine 2% Cloths 1 Application(s) Topical daily  furosemide    Tablet 40 milliGRAM(s) Oral two times a day  hydrALAZINE 10 milliGRAM(s) Oral every 8 hours  nitroglycerin  Infusion 5 MICROgram(s)/Min IV Continuous <Continuous>  vancomycin  IVPB 1000 milliGRAM(s) IV Intermittent every 12 hours    PRN MEDICATIONS  acetaminophen     Tablet .. 650 milliGRAM(s) Oral every 6 hours PRN  ALPRAZolam 0.25 milliGRAM(s) Oral every 8 hours PRN  aluminum hydroxide/magnesium hydroxide/simethicone Suspension 30 milliLiter(s) Oral every 4 hours PRN  melatonin 3 milliGRAM(s) Oral at bedtime PRN  ondansetron Injectable 4 milliGRAM(s) IV Push every 8 hours PRN    VITALS:   T(F): 97.7  HR: 73  BP: 148/67  RR: 14  SpO2: 94%    PHYSICAL EXAM:  GENERAL:   ( x ) NAD, lying in bed comfortably     (  ) obtunded     (  ) lethargic     (  ) somnolent    HEAD:   ( x ) Atraumatic     (  ) hematoma     (  ) laceration (specify location:       )     NECK:  ( x ) Supple     (  ) neck stiffness     (  ) nuchal rigidity     (  )  no JVD     (  ) JVD present ( -- cm)    HEART:  Rate -->     (  ) normal rate     ( x ) bradycardic     (  ) tachycardic  Rhythm -->     ( x ) regular     (  ) regularly irregular     (  ) irregularly irregular  Murmurs -->     ( x ) normal s1s2     (  ) systolic murmur     (  ) diastolic murmur     (  ) continuous murmur      (  ) S3 present     (  ) S4 present    LUNGS:   ( x ) Unlabored respirations     (  ) tachypnea  ( x ) B/L air entry     (  ) decreased breath sounds in:  (location     )    ( x ) no adventitious sound     (  ) crackles     (  ) wheezing      (  ) rhonchi      (specify location:       )  (  ) chest wall tenderness (specify location:       )    ABDOMEN:   ( x ) Soft     (  ) tense   |   ( x ) nondistended     (  ) distended   |   (  ) +BS     (  ) hypoactive bowel sounds     (  ) hyperactive bowel sounds  ( x ) nontender     (  ) RUQ tenderness     (  ) RLQ tenderness     (  ) LLQ tenderness     (  ) epigastric tenderness     (  ) diffuse tenderness  (  ) Splenomegaly      (  ) Hepatomegaly      (  ) Jaundice     (  ) ecchymosis     EXTREMITIES:  ( x ) Normal     (  ) Rash     (  ) ecchymosis     (  ) varicose veins      (  ) pitting edema     (  ) non-pitting edema   (  ) ulceration     (  ) gangrene:     (location:     )    NERVOUS SYSTEM:    ( x ) A&Ox3     (  ) confused     (  ) lethargic    SKIN:   ( x ) No rashes or lesions     (  ) maculopapular rash     (  ) pustules     (  ) vesicles     (  ) ulcer     (  ) ecchymosis     (specify location:     )    LABS:                        13.0   13.13 )-----------( 278      ( 07 Dec 2023 05:21 )             39.4     12-07    139  |  104  |  37<H>  ----------------------------<  217<H>  4.6   |  24  |  0.9    Ca    8.9      07 Dec 2023 05:21  Mg     2.4     12-07    TPro  6.1  /  Alb  3.5  /  TBili  0.3  /  DBili  x   /  AST  15  /  ALT  14  /  AlkPhos  83  12-07    PT/INR - ( 06 Dec 2023 05:19 )   PT: 13.20 sec;   INR: 1.16 ratio         PTT - ( 06 Dec 2023 05:19 )  PTT:30.9 sec  Urinalysis Basic - ( 07 Dec 2023 05:21 )    Color: x / Appearance: x / SG: x / pH: x  Gluc: 217 mg/dL / Ketone: x  / Bili: x / Urobili: x   Blood: x / Protein: x / Nitrite: x   Leuk Esterase: x / RBC: x / WBC x   Sq Epi: x / Non Sq Epi: x / Bacteria: x

## 2023-12-07 NOTE — DISCHARGE NOTE PROVIDER - CARE PROVIDERS DIRECT ADDRESSES
[email or web address]

## 2023-12-07 NOTE — DISCHARGE NOTE PROVIDER - PROVIDER TOKENS
PROVIDER:[TOKEN:[[id number]]],PROVIDER:[TOKEN:[[id number]]] PROVIDER:[TOKEN:[[id number]]],PROVIDER:[TOKEN:[[id number]]]

## 2023-12-07 NOTE — DISCHARGE NOTE PROVIDER - NSDCCPCAREPLAN_GEN_ALL_CORE_FT
PRINCIPAL DISCHARGE DIAGNOSIS  Diagnosis: Complete heart block  Assessment and Plan of Treatment: Heart block, also called AV block, is when the electrical signal that controls your heartbeat is partially or completely blocked. This makes your heart beat slowly or skip beats and your heart can’t pump blood effectively. Symptoms include dizziness, fainting, tiredness and shortness of breath. Pacemaker implantation is a common treatment. You were placed a pace maker on 12/6. Please follow up with your cardiologist/EP doctor after discharge.  Call 911 or your doctor or go to ER if you experience fainting, dizziness, chest pain, short of breath, tiredness.

## 2023-12-07 NOTE — PROGRESS NOTE ADULT - SUBJECTIVE AND OBJECTIVE BOX
INTERVAL HPI/OVERNIGHT EVENTS:    Patient s/p DC PPMimplant  No event over night. Pt without complains    MEDICATIONS  (STANDING):  amLODIPine   Tablet 10 milliGRAM(s) Oral daily  atorvastatin 80 milliGRAM(s) Oral at bedtime  cefTRIAXone   IVPB 1000 milliGRAM(s) IV Intermittent every 24 hours  chlorhexidine 2% Cloths 1 Application(s) Topical daily  furosemide    Tablet 40 milliGRAM(s) Oral two times a day  hydrALAZINE 10 milliGRAM(s) Oral every 8 hours  nitroglycerin  Infusion 5 MICROgram(s)/Min (1.5 mL/Hr) IV Continuous <Continuous>  vancomycin  IVPB 1000 milliGRAM(s) IV Intermittent every 12 hours    MEDICATIONS  (PRN):  acetaminophen     Tablet .. 650 milliGRAM(s) Oral every 6 hours PRN Temp greater or equal to 38C (100.4F), Mild Pain (1 - 3)  ALPRAZolam 0.25 milliGRAM(s) Oral every 8 hours PRN Anxiety  aluminum hydroxide/magnesium hydroxide/simethicone Suspension 30 milliLiter(s) Oral every 4 hours PRN Dyspepsia  melatonin 3 milliGRAM(s) Oral at bedtime PRN Insomnia  ondansetron Injectable 4 milliGRAM(s) IV Push every 8 hours PRN Nausea and/or Vomiting      Allergies    penicillin (Other)    Intolerances          Vital Signs Last 24 Hrs  T(C): 36.5 (07 Dec 2023 04:00), Max: 36.8 (06 Dec 2023 15:30)  T(F): 97.7 (07 Dec 2023 04:00), Max: 98.3 (06 Dec 2023 15:30)  HR: 72 (07 Dec 2023 07:00) (33 - 90)  BP: 149/65 (07 Dec 2023 07:00) (125/60 - 212/84)  BP(mean): 94 (07 Dec 2023 07:00) (86 - 118)  RR: 15 (07 Dec 2023 07:00) (12 - 31)  SpO2: 98% (07 Dec 2023 08:32) (91% - 99%)    Parameters below as of 07 Dec 2023 08:32  Patient On (Oxygen Delivery Method): room air        GENERAL: In no apparent distress, well nourished, and hydrated.  HEART: Regular rate and rhythm; No murmurs, rubs, or gallops.  	Wound healing well; No hematoma; no bleeding  PULMONARY: Clear to auscultation and perfusion.  No rales, wheezing, or rhonchi bilaterally.  ABDOMEN: Soft, Nontender, Nondistended; Bowel sounds present  EXTREMITIES:  2+ Peripheral Pulses, No clubbing, cyanosis, or edema  NEUROLOGICAL: Grossly nonfocal    12 Lead ECG:   Ventricular Rate 74 BPM    Atrial Rate 74 BPM    P-R Interval 144 ms    QRS Duration 142 ms    Q-T Interval 462 ms    QTC Calculation(Bazett) 512 ms    P Axis 66 degrees    R Axis -38 degrees    T Axis 134 degrees    Diagnosis Line Atrial-sensed ventricular-paced rhythm  Abnormal ECG    Confirmed by Phill Mcdonald ([id number]) on 12/7/2023 7:48:12 AM (12-07-23 @ 06:27)  12 Lead ECG:   Ventricular Rate 32 BPM    Atrial Rate 83 BPM    QRS Duration 108 ms    Q-T Interval 682 ms    QTC Calculation(Bazett) 497 ms    P Axis 59 degrees    R Axis -6 degrees    T Axis 127 degrees    Diagnosis Line Sinus rhythm with complete heart block and Junctional bradycardia  Incomplete right bundle branch block  Left ventricular hypertrophy with repolarization abnormality  Prolonged QT  Abnormal ECG    Confirmed by HANK NEFF MD ([id number]) on 12/6/2023 9:48:16 AM (12-06-23 @ 06:21)  12 Lead ECG:   Ventricular Rate 37 BPM    Atrial Rate 37 BPM    QRS Duration 122 ms    Q-T Interval 648 ms    QTC Calculation(Bazett) 508 ms    P Axis 64 degrees    R Axis 97 degrees    T Axis -59 degrees    Diagnosis Line Sinus rhythm with complete heart block and Idioventricular rhythm  Rightward axis  RSR' or QR pattern in V1 suggests right ventricular conduction delay  ST & T wave abnormality, consider inferior ischemia  ST & T wave abnormality, consider anterolateral ischemia  Abnormal ECG    Confirmed by HANK NEFF MD ([id number]) on 12/6/2023 1:55:40 PM (12-05-23 @ 00:59)      RADIOLOGY & ADDITIONAL TESTS:    < from: Xray Chest 1 View- PORTABLE-Urgent (Xray Chest 1 View- PORTABLE-Urgent .) (12.06.23 @ 20:00) >  Impression:  1. No radiographic evidence of acute cardiopulmonary disease.  2. Interval placement of left chest wall pacer device, with lead tips   overlying the right atrium and right ventricle.    < end of copied text >    CXR P+L leads in appropriate positions, no pneumo appreciated  INTERVAL HPI/OVERNIGHT EVENTS:    Patient s/p DC PPMimplant  No event over night. Pt without complains    MEDICATIONS  (STANDING):  amLODIPine   Tablet 10 milliGRAM(s) Oral daily  atorvastatin 80 milliGRAM(s) Oral at bedtime  cefTRIAXone   IVPB 1000 milliGRAM(s) IV Intermittent every 24 hours  chlorhexidine 2% Cloths 1 Application(s) Topical daily  furosemide    Tablet 40 milliGRAM(s) Oral two times a day  hydrALAZINE 10 milliGRAM(s) Oral every 8 hours  nitroglycerin  Infusion 5 MICROgram(s)/Min (1.5 mL/Hr) IV Continuous <Continuous>  vancomycin  IVPB 1000 milliGRAM(s) IV Intermittent every 12 hours    MEDICATIONS  (PRN):  acetaminophen     Tablet .. 650 milliGRAM(s) Oral every 6 hours PRN Temp greater or equal to 38C (100.4F), Mild Pain (1 - 3)  ALPRAZolam 0.25 milliGRAM(s) Oral every 8 hours PRN Anxiety  aluminum hydroxide/magnesium hydroxide/simethicone Suspension 30 milliLiter(s) Oral every 4 hours PRN Dyspepsia  melatonin 3 milliGRAM(s) Oral at bedtime PRN Insomnia  ondansetron Injectable 4 milliGRAM(s) IV Push every 8 hours PRN Nausea and/or Vomiting      Allergies    penicillin (Other)    Intolerances          Vital Signs Last 24 Hrs  T(C): 36.5 (07 Dec 2023 04:00), Max: 36.8 (06 Dec 2023 15:30)  T(F): 97.7 (07 Dec 2023 04:00), Max: 98.3 (06 Dec 2023 15:30)  HR: 72 (07 Dec 2023 07:00) (33 - 90)  BP: 149/65 (07 Dec 2023 07:00) (125/60 - 212/84)  BP(mean): 94 (07 Dec 2023 07:00) (86 - 118)  RR: 15 (07 Dec 2023 07:00) (12 - 31)  SpO2: 98% (07 Dec 2023 08:32) (91% - 99%)    Parameters below as of 07 Dec 2023 08:32  Patient On (Oxygen Delivery Method): room air        GENERAL: In no apparent distress, well nourished, and hydrated.  HEART: Regular rate and rhythm; No murmurs, rubs, or gallops.  	Wound healing well; No hematoma; no bleeding  PULMONARY: Clear to auscultation and perfusion.  No rales, wheezing, or rhonchi bilaterally.  ABDOMEN: Soft, Nontender, Nondistended; Bowel sounds present  EXTREMITIES:  2+ Peripheral Pulses, No clubbing, cyanosis, or edema  NEUROLOGICAL: Grossly nonfocal    12 Lead ECG:   Ventricular Rate 74 BPM    Atrial Rate 74 BPM    P-R Interval 144 ms    QRS Duration 142 ms    Q-T Interval 462 ms    QTC Calculation(Bazett) 512 ms    P Axis 66 degrees    R Axis -38 degrees    T Axis 134 degrees    Diagnosis Line Atrial-sensed ventricular-paced rhythm  Abnormal ECG    Confirmed by Phill Mcdonald ([id number]) on 12/7/2023 7:48:12 AM (12-07-23 @ 06:27)  12 Lead ECG:   Ventricular Rate 32 BPM    Atrial Rate 83 BPM    QRS Duration 108 ms    Q-T Interval 682 ms    QTC Calculation(Bazett) 497 ms    P Axis 59 degrees    R Axis -6 degrees    T Axis 127 degrees    Diagnosis Line Sinus rhythm with complete heart block and Junctional bradycardia  Incomplete right bundle branch block  Left ventricular hypertrophy with repolarization abnormality  Prolonged QT  Abnormal ECG    Confirmed by HANK NEFF MD ([id number]) on 12/6/2023 9:48:16 AM (12-06-23 @ 06:21)  12 Lead ECG:   Ventricular Rate 37 BPM    Atrial Rate 37 BPM    QRS Duration 122 ms    Q-T Interval 648 ms    QTC Calculation(Bazett) 508 ms    P Axis 64 degrees    R Axis 97 degrees    T Axis -59 degrees    Diagnosis Line Sinus rhythm with complete heart block and Idioventricular rhythm  Rightward axis  RSR' or QR pattern in V1 suggests right ventricular conduction delay  ST & T wave abnormality, consider inferior ischemia  ST & T wave abnormality, consider anterolateral ischemia  Abnormal ECG    Confirmed by HANK NEFF MD ([id number]) on 12/6/2023 1:55:40 PM (12-05-23 @ 00:59)      RADIOLOGY & ADDITIONAL TESTS:    < from: Xray Chest 1 View- PORTABLE-Urgent (Xray Chest 1 View- PORTABLE-Urgent .) (12.06.23 @ 20:00) >  Impression:  1. No radiographic evidence of acute cardiopulmonary disease.  2. Interval placement of left chest wall pacer device, with lead tips   overlying the right atrium and right ventricle.    < end of copied text >    CXR P+L leads in appropriate positions, no pneumo appreciated

## 2023-12-08 RX ORDER — PIOGLITAZONE HYDROCHLORIDE 15 MG/1
1 TABLET ORAL
Qty: 30 | Refills: 0
Start: 2023-12-08 | End: 2024-01-06

## 2023-12-08 RX ORDER — ATORVASTATIN CALCIUM 80 MG/1
1 TABLET, FILM COATED ORAL
Qty: 30 | Refills: 0
Start: 2023-12-08 | End: 2024-01-06

## 2023-12-08 RX ORDER — ASPIRIN/CALCIUM CARB/MAGNESIUM 324 MG
1 TABLET ORAL
Qty: 30 | Refills: 2
Start: 2023-12-08 | End: 2024-03-06

## 2023-12-08 RX ORDER — PIOGLITAZONE HYDROCHLORIDE 15 MG/1
1 TABLET ORAL
Refills: 0 | DISCHARGE

## 2023-12-12 DIAGNOSIS — Z79.84 LONG TERM (CURRENT) USE OF ORAL HYPOGLYCEMIC DRUGS: ICD-10-CM

## 2023-12-12 DIAGNOSIS — E11.9 TYPE 2 DIABETES MELLITUS WITHOUT COMPLICATIONS: ICD-10-CM

## 2023-12-12 DIAGNOSIS — I16.1 HYPERTENSIVE EMERGENCY: ICD-10-CM

## 2023-12-12 DIAGNOSIS — F33.9 MAJOR DEPRESSIVE DISORDER, RECURRENT, UNSPECIFIED: ICD-10-CM

## 2023-12-12 DIAGNOSIS — Z88.0 ALLERGY STATUS TO PENICILLIN: ICD-10-CM

## 2023-12-12 DIAGNOSIS — Z79.82 LONG TERM (CURRENT) USE OF ASPIRIN: ICD-10-CM

## 2023-12-12 DIAGNOSIS — F20.9 SCHIZOPHRENIA, UNSPECIFIED: ICD-10-CM

## 2023-12-12 DIAGNOSIS — N39.0 URINARY TRACT INFECTION, SITE NOT SPECIFIED: ICD-10-CM

## 2023-12-12 DIAGNOSIS — R06.02 SHORTNESS OF BREATH: ICD-10-CM

## 2023-12-12 DIAGNOSIS — F17.200 NICOTINE DEPENDENCE, UNSPECIFIED, UNCOMPLICATED: ICD-10-CM

## 2023-12-12 DIAGNOSIS — I44.2 ATRIOVENTRICULAR BLOCK, COMPLETE: ICD-10-CM

## 2023-12-12 DIAGNOSIS — Z79.899 OTHER LONG TERM (CURRENT) DRUG THERAPY: ICD-10-CM

## 2023-12-12 DIAGNOSIS — Z86.73 PERSONAL HISTORY OF TRANSIENT ISCHEMIC ATTACK (TIA), AND CEREBRAL INFARCTION WITHOUT RESIDUAL DEFICITS: ICD-10-CM

## 2023-12-12 DIAGNOSIS — I10 ESSENTIAL (PRIMARY) HYPERTENSION: ICD-10-CM

## 2023-12-19 ENCOUNTER — APPOINTMENT (OUTPATIENT)
Dept: CARE COORDINATION | Facility: HOME HEALTH | Age: 63
End: 2023-12-19
Payer: MEDICARE

## 2023-12-19 DIAGNOSIS — I63.9 CEREBRAL INFARCTION, UNSPECIFIED: ICD-10-CM

## 2023-12-19 DIAGNOSIS — E11.9 TYPE 2 DIABETES MELLITUS W/OUT COMPLICATIONS: ICD-10-CM

## 2023-12-19 DIAGNOSIS — F31.9 BIPOLAR DISORDER, UNSPECIFIED: ICD-10-CM

## 2023-12-19 DIAGNOSIS — Z95.0 PRESENCE OF CARDIAC PACEMAKER: ICD-10-CM

## 2023-12-19 DIAGNOSIS — Z72.0 TOBACCO USE: ICD-10-CM

## 2023-12-19 DIAGNOSIS — I44.2 ATRIOVENTRICULAR BLOCK, COMPLETE: ICD-10-CM

## 2023-12-19 DIAGNOSIS — F17.200 NICOTINE DEPENDENCE, UNSPECIFIED, UNCOMPLICATED: ICD-10-CM

## 2023-12-19 PROCEDURE — 99349 HOME/RES VST EST MOD MDM 40: CPT | Mod: 95,25

## 2023-12-21 PROBLEM — Z95.0 PACEMAKER: Status: ACTIVE | Noted: 2023-12-21

## 2023-12-21 PROBLEM — I44.2 COMPLETE HEART BLOCK: Status: ACTIVE | Noted: 2023-12-21

## 2023-12-21 PROBLEM — I63.9 ACUTE ISCHEMIC STROKE: Status: ACTIVE | Noted: 2021-03-24

## 2023-12-21 PROBLEM — Z72.0 TOBACCO USE: Status: ACTIVE | Noted: 2023-12-21

## 2023-12-21 PROBLEM — F17.200 SMOKER: Status: ACTIVE | Noted: 2021-03-24

## 2023-12-21 PROBLEM — F31.9: Status: ACTIVE | Noted: 2023-12-21

## 2023-12-21 PROBLEM — E11.9 TYPE 2 DIABETES MELLITUS WITHOUT COMPLICATION, WITHOUT LONG-TERM CURRENT USE OF INSULIN: Status: ACTIVE | Noted: 2023-12-21

## 2023-12-21 RX ORDER — CEPHALEXIN 500 MG/1
500 CAPSULE ORAL
Qty: 10 | Refills: 0 | Status: DISCONTINUED | COMMUNITY
Start: 2023-12-07

## 2023-12-21 NOTE — HISTORY OF PRESENT ILLNESS
[FreeTextEntry1] : F/u post hospitalization at [facility] from 12/3-7 for SOB  [de-identified] : Brief Hospital Course: 63 yo female wit pmh of manic-depressive disorder, DMT2, active smoker. BIBEMS for SOB and fever. : 98.9. HR: 35. BP: 219/95. RR: 19. SpO2: 95%. Labs were significant for K+ 5.9 (hemolyzed), Bun 23, Cr 1.1 (baseline 0.7 in 2021), troponin negative, pBNP 75620. VBG: lactate 3.1, pH 7.23, PCO2 47. In CCU, patient was started on nitroglycerin drip for BP control. Hydralazine and Amlodipine PO were also added. Lasix IV 40 mg BID. TTE 12/4: EF 65-70%. Normal global left ventricular systolic function. Patient underwent cardiac MRI on 12/5 but did not complete the test. Limited image shows Visually normal biventricular size and function (Estimated LVEF 63%). Pacemaker was placed by EP team on 12/6. CXR post procedure confirms appropriate placement of the pace maker. Patient will need to follow up with EP doctor/cardiologist after discharge.   This pt is enrolled in the [facility] Discharge Bridge program in collaboration with [facility] Care services follow up program for continuity of cares/p recent hospitalization until pt is seen by a PCP.  Televisit made with pt and RN. She is alert and oriented x 3. Reports feeling better, no SOB. Still feeling fatigued. Still smoking, not interested in stopping. Offered smoking cessation referral when she is ready. Safe smoking discussed, to avoid smoking in bed or when she is tired. Pt not checking blood glucose because she does not have a glucometer. Order sent to her local pharmacy.  Pt is connected to the ACT team who assist her with medical appts. Has f/u appt on EP 1/3/2024 for pacemaker placed 12/6. Site is clean, dry, intact. Denies CP, SOB. Dr. Joyner on  1/4/2024.

## 2023-12-21 NOTE — REVIEW OF SYSTEMS
[Vision Problems] : vision problems [Incontinence] : incontinence [Anxiety] : anxiety [Depression] : depression [Negative] : Heme/Lymph [Fever] : no fever [Chills] : no chills [Fatigue] : no fatigue [Hearing Loss] : no hearing loss [Nosebleed] : no nosebleeds [Chest Pain] : no chest pain [Lower Ext Edema] : no lower extremity edema [Shortness Of Breath] : no shortness of breath [Wheezing] : no wheezing [Cough] : no cough [Nocturia] : no nocturia [Hematuria] : no hematuria [Joint Pain] : no joint pain [Muscle Weakness] : no muscle weakness [Muscle Pain] : no muscle pain [Itching] : no itching [Skin Rash] : no skin rash [Headache] : no headache [Dizziness] : no dizziness [Fainting] : no fainting [Memory Loss] : no memory loss [Insomnia] : no insomnia [de-identified] : follow

## 2023-12-21 NOTE — COUNSELING
[Fall prevention counseling provided] : Fall prevention counseling provided [Yes] : Risk of tobacco use and health benefits of smoking cessation discussed: Yes [Cessation strategies including cessation program discussed] : Cessation strategies including cessation program discussed [Use of nicotine replacement therapies and other medications discussed] : Use of nicotine replacement therapies and other medications discussed [Encouraged to pick a quit date and identify support needed to quit] : Encouraged to pick a quit date and identify support needed to quit [No] : Not willing to quit smoking [None] : None [Good understanding] : Patient has a good understanding of lifestyle changes and steps needed to achieve self management goal [FreeTextEntry1] : 4

## 2023-12-21 NOTE — PHYSICAL EXAM
[No Acute Distress] : no acute distress [Normal Sclera/Conjunctiva] : normal sclera/conjunctiva [Normal Outer Ear/Nose] : the outer ears and nose were normal in appearance [No JVD] : no jugular venous distention [No Respiratory Distress] : no respiratory distress  [No Edema] : there was no peripheral edema [Normal] : normal gait, coordination grossly intact, no focal deficits and deep tendon reflexes were 2+ and symmetric [Speech Grossly Normal] : speech grossly normal [Memory Grossly Normal] : memory grossly normal [Normal Affect] : the affect was normal [Alert and Oriented x3] : oriented to person, place, and time [Normal Insight/Judgement] : insight and judgment were intact [Normal Mood] : the mood was normal

## 2024-01-03 ENCOUNTER — APPOINTMENT (OUTPATIENT)
Dept: ELECTROPHYSIOLOGY | Facility: CLINIC | Age: 64
End: 2024-01-03
Payer: MEDICARE

## 2024-01-03 VITALS
WEIGHT: 137 LBS | SYSTOLIC BLOOD PRESSURE: 136 MMHG | HEIGHT: 66.5 IN | DIASTOLIC BLOOD PRESSURE: 86 MMHG | BODY MASS INDEX: 21.76 KG/M2 | HEART RATE: 83 BPM

## 2024-01-03 PROCEDURE — 93280 PM DEVICE PROGR EVAL DUAL: CPT

## 2024-01-03 PROCEDURE — 99214 OFFICE O/P EST MOD 30 MIN: CPT | Mod: 24

## 2024-01-03 PROCEDURE — 93000 ELECTROCARDIOGRAM COMPLETE: CPT | Mod: 59

## 2024-01-03 RX ORDER — PIOGLITAZONE HYDROCHLORIDE 30 MG/1
30 TABLET ORAL DAILY
Refills: 0 | Status: ACTIVE | COMMUNITY
Start: 2023-12-08

## 2024-01-03 NOTE — ADDENDUM
[FreeTextEntry1] : Joanna LOVELL assisted in documentation on 01/03/2024 acting as a scribe for Dr. Hans Byrnes.

## 2024-01-03 NOTE — PROCEDURE
[Pacemaker] : pacemaker [DDD] : DDD [Voltage: ___ volts] : Voltage was [unfilled] volts [Longevity: ___ months] : The estimated remaining battery life is [unfilled] months [Threshold Testing Performed] : Threshold testing was performed [Lead Imp:  ___ohms] : lead impedance was [unfilled] ohms [Sensing Amplitude ___mv] : sensing amplitude was [unfilled] mv [___V @] : [unfilled] V [___ ms] : [unfilled] ms [Programmed for Longevity] : output reprogrammed for improved battery longevity [Apace-Vpace ___ %] : Apace-Vpace [unfilled]% [de-identified] : [employer] [de-identified] : [id number] [de-identified] : [id number] [de-identified] : 12/6/2023 [de-identified] : no episodes recorded, RV threshold incresed from 0.75 to 1.5V@0.4ms since implant [de-identified] : 60

## 2024-01-03 NOTE — ASSESSMENT
[FreeTextEntry1] : ## Complete Heart Block s/p DC-PPM (SJM, 23, dependent, LBB pacing)   # HTN      - PPM interrogation shows normally functioning DC-PPM. Battery life ok. No new events. - PET scan to evaluate for complete heart block. It is uncommon to have complete heart block at her age. This was primarily AV rhoda block.  Suspicion for sarcoidosis remains high. We tried to do an MRI, but she could not tolerate it. Will do PET scan at least.   - If PET scan is negative, we will consider doing a stress test. However, pre-test probability is lower for her.   - BP a little high today. Will continue BP monitoring with lifestyle modifications. Smoking cessation.   - Continue with Carvedilol 3.125 mg and Amlodipine 10 mg.   - BP diary at home.   - Remote monitoring.   - RTC in 6 months.

## 2024-01-03 NOTE — HISTORY OF PRESENT ILLNESS
[de-identified] : Ms. Wren is a 63-year-old female with PMHx of DM type 2, manic depressive disorder, smoker, presented with complete heart block s/p DC-PPM (SJM, 23, dependent, LBB pacing), is here for follow-up.     Accompanied by her .   Feels better.     Denies chest pain, shortness of breath, palpitation, dizziness or LOC except noted above.  EKG (01/03/2024): SR- @ 83, , , QTc 502  Echo (12/2023): EF 65-70%, mild LAE, mild to moderate MR, mild TR.   Cardio: none

## 2024-01-23 ENCOUNTER — OUTPATIENT (OUTPATIENT)
Dept: OUTPATIENT SERVICES | Facility: HOSPITAL | Age: 64
LOS: 1 days | End: 2024-01-23
Payer: MEDICARE

## 2024-01-23 DIAGNOSIS — Z12.31 ENCOUNTER FOR SCREENING MAMMOGRAM FOR MALIGNANT NEOPLASM OF BREAST: ICD-10-CM

## 2024-01-23 DIAGNOSIS — Z90.710 ACQUIRED ABSENCE OF BOTH CERVIX AND UTERUS: Chronic | ICD-10-CM

## 2024-01-23 PROCEDURE — 77067 SCR MAMMO BI INCL CAD: CPT

## 2024-01-23 PROCEDURE — 77067 SCR MAMMO BI INCL CAD: CPT | Mod: 26

## 2024-01-23 PROCEDURE — 77063 BREAST TOMOSYNTHESIS BI: CPT

## 2024-01-23 PROCEDURE — 77063 BREAST TOMOSYNTHESIS BI: CPT | Mod: 26

## 2024-01-24 DIAGNOSIS — Z12.31 ENCOUNTER FOR SCREENING MAMMOGRAM FOR MALIGNANT NEOPLASM OF BREAST: ICD-10-CM

## 2024-01-30 ENCOUNTER — OUTPATIENT (OUTPATIENT)
Dept: OUTPATIENT SERVICES | Facility: HOSPITAL | Age: 64
LOS: 1 days | End: 2024-01-30
Payer: MEDICARE

## 2024-01-30 DIAGNOSIS — Z90.710 ACQUIRED ABSENCE OF BOTH CERVIX AND UTERUS: Chronic | ICD-10-CM

## 2024-01-30 DIAGNOSIS — R92.8 OTHER ABNORMAL AND INCONCLUSIVE FINDINGS ON DIAGNOSTIC IMAGING OF BREAST: ICD-10-CM

## 2024-01-30 PROCEDURE — 76641 ULTRASOUND BREAST COMPLETE: CPT | Mod: 26,50

## 2024-01-30 PROCEDURE — G0279: CPT

## 2024-01-30 PROCEDURE — G0279: CPT | Mod: 26

## 2024-01-30 PROCEDURE — 77066 DX MAMMO INCL CAD BI: CPT | Mod: 26

## 2024-01-30 PROCEDURE — 76641 ULTRASOUND BREAST COMPLETE: CPT | Mod: 50

## 2024-01-30 PROCEDURE — 77066 DX MAMMO INCL CAD BI: CPT

## 2024-01-31 DIAGNOSIS — R92.8 OTHER ABNORMAL AND INCONCLUSIVE FINDINGS ON DIAGNOSTIC IMAGING OF BREAST: ICD-10-CM

## 2024-03-08 ENCOUNTER — RESULT REVIEW (OUTPATIENT)
Age: 64
End: 2024-03-08

## 2024-03-08 ENCOUNTER — OUTPATIENT (OUTPATIENT)
Dept: OUTPATIENT SERVICES | Facility: HOSPITAL | Age: 64
LOS: 1 days | End: 2024-03-08
Payer: MEDICARE

## 2024-03-08 DIAGNOSIS — I44.2 ATRIOVENTRICULAR BLOCK, COMPLETE: ICD-10-CM

## 2024-03-08 DIAGNOSIS — Z90.710 ACQUIRED ABSENCE OF BOTH CERVIX AND UTERUS: Chronic | ICD-10-CM

## 2024-03-08 DIAGNOSIS — Z00.8 ENCOUNTER FOR OTHER GENERAL EXAMINATION: ICD-10-CM

## 2024-03-08 PROCEDURE — A9500: CPT

## 2024-03-08 PROCEDURE — 78451 HT MUSCLE IMAGE SPECT SING: CPT

## 2024-03-08 PROCEDURE — 78451 HT MUSCLE IMAGE SPECT SING: CPT | Mod: 26,MH

## 2024-03-09 DIAGNOSIS — I44.2 ATRIOVENTRICULAR BLOCK, COMPLETE: ICD-10-CM

## 2024-03-13 ENCOUNTER — OUTPATIENT (OUTPATIENT)
Dept: OUTPATIENT SERVICES | Facility: HOSPITAL | Age: 64
LOS: 1 days | End: 2024-03-13
Payer: MEDICARE

## 2024-03-13 ENCOUNTER — RESULT REVIEW (OUTPATIENT)
Age: 64
End: 2024-03-13

## 2024-03-13 DIAGNOSIS — Z90.710 ACQUIRED ABSENCE OF BOTH CERVIX AND UTERUS: Chronic | ICD-10-CM

## 2024-03-13 DIAGNOSIS — I44.2 ATRIOVENTRICULAR BLOCK, COMPLETE: ICD-10-CM

## 2024-03-13 DIAGNOSIS — Z00.8 ENCOUNTER FOR OTHER GENERAL EXAMINATION: ICD-10-CM

## 2024-03-13 LAB — GLUCOSE BLDC GLUCOMTR-MCNC: 152 MG/DL — HIGH (ref 70–99)

## 2024-03-13 PROCEDURE — 78429 MYOCRD IMG PET 1 STD W/CT: CPT

## 2024-03-13 PROCEDURE — 82962 GLUCOSE BLOOD TEST: CPT

## 2024-03-13 PROCEDURE — 78429 MYOCRD IMG PET 1 STD W/CT: CPT | Mod: 26

## 2024-03-13 PROCEDURE — A9552: CPT

## 2024-03-14 DIAGNOSIS — I44.2 ATRIOVENTRICULAR BLOCK, COMPLETE: ICD-10-CM

## 2024-04-02 ENCOUNTER — NON-APPOINTMENT (OUTPATIENT)
Age: 64
End: 2024-04-02

## 2024-04-03 ENCOUNTER — APPOINTMENT (OUTPATIENT)
Dept: CARDIOLOGY | Facility: CLINIC | Age: 64
End: 2024-04-03
Payer: MEDICARE

## 2024-04-03 PROCEDURE — 93294 REM INTERROG EVL PM/LDLS PM: CPT

## 2024-04-03 PROCEDURE — 93296 REM INTERROG EVL PM/IDS: CPT

## 2024-04-17 ENCOUNTER — APPOINTMENT (OUTPATIENT)
Dept: ELECTROPHYSIOLOGY | Facility: CLINIC | Age: 64
End: 2024-04-17
Payer: MEDICARE

## 2024-04-17 VITALS
HEIGHT: 66.5 IN | SYSTOLIC BLOOD PRESSURE: 140 MMHG | DIASTOLIC BLOOD PRESSURE: 86 MMHG | BODY MASS INDEX: 21.12 KG/M2 | HEART RATE: 79 BPM | WEIGHT: 133 LBS

## 2024-04-17 PROCEDURE — 93280 PM DEVICE PROGR EVAL DUAL: CPT

## 2024-04-17 PROCEDURE — 93000 ELECTROCARDIOGRAM COMPLETE: CPT | Mod: 59

## 2024-04-17 PROCEDURE — 99214 OFFICE O/P EST MOD 30 MIN: CPT

## 2024-04-17 RX ORDER — LINAGLIPTIN AND METFORMIN HYDROCHLORIDE 2.5; 5 MG/1; MG/1
2.5-5 TABLET, FILM COATED ORAL TWICE DAILY
Refills: 0 | Status: ACTIVE | COMMUNITY

## 2024-04-17 RX ORDER — AMLODIPINE BESYLATE 10 MG/1
10 TABLET ORAL DAILY
Refills: 0 | Status: ACTIVE | COMMUNITY

## 2024-04-17 RX ORDER — ATORVASTATIN CALCIUM 80 MG/1
80 TABLET, FILM COATED ORAL DAILY
Refills: 0 | Status: ACTIVE | COMMUNITY

## 2024-04-17 RX ORDER — ASPIRIN 81 MG/1
81 TABLET, COATED ORAL DAILY
Refills: 0 | Status: ACTIVE | COMMUNITY

## 2024-04-17 RX ORDER — FUROSEMIDE 40 MG/1
40 TABLET ORAL DAILY
Refills: 0 | Status: ACTIVE | COMMUNITY

## 2024-04-17 RX ORDER — CARVEDILOL 3.12 MG/1
3.12 TABLET, FILM COATED ORAL TWICE DAILY
Refills: 0 | Status: ACTIVE | COMMUNITY

## 2024-04-17 RX ORDER — FLUPHENAZINE DECANOATE 25 MG/ML
SYRINGE (ML) INJECTION AS DIRECTED
Refills: 0 | Status: ACTIVE | COMMUNITY

## 2024-04-17 NOTE — HISTORY OF PRESENT ILLNESS
[de-identified] : Ms. Wren is a 63-year-old female with PMHx of DM type 2, manic depressive disorder, smoker, presented with complete heart block s/p DC-PPM (SJM, 23, dependent, LBB pacing), is here for follow-up.     Accompanied by her .   Feels better.     04/17/2024: Feels fine. Accompanied by her . Found to have AF on device. Is here for discussion.    Denies chest pain, shortness of breath, palpitation, dizziness or LOC except noted above.  EKG (04/17/2024): SR- @ 80, , , QTc 483  EKG (01/03/2024): SR- @ 83, , , QTc 502  Echo (12/2023): EF 65-70%, mild LAE, mild to moderate MR, mild TR.   Cardio: none

## 2024-04-17 NOTE — ASSESSMENT
[FreeTextEntry1] : ## Complete Heart Block s/p DC-PPM (SJM, 23, dependent, LBB pacing)   # HTN    ## PAF  - PPM interrogation shows normally functioning DC-PPM. Battery life ok. No new events. - PET scan reviewed with the patient. No significant sarcoidosis. She has something on lateral wall which cannot explain heart block. In this scenario, we will continue with current observation.  - She had episodes of AF. Asymptomatic. CHADSVASC 2+ (HTN, female).   - Discussed risk of thromboembolism associated with atrial fibrillation, risk reduction with anticoagulation, and bleeding associated with oral anticoagulation. After discussing pros-cons, she does not want to start anticoagulation. She is afraid in general. No specific reason given.  - I also discussed with her the possibility of CATALYST-AF trial, which will investigate DOAC versus Amulet. She will think about it and let us know.  - BP a little high today. Will continue BP monitoring with lifestyle modifications. Smoking cessation.   - Continue with Carvedilol 3.125 mg and Amlodipine 10 mg.   - BP diary at home.   - Remote monitoring.   - RTC in 6 months.

## 2024-04-17 NOTE — ADDENDUM
[FreeTextEntry1] : Joanna LOVELL assisted in documentation on 04/17/2024 acting as a scribe for Dr. Hans Byrnes.

## 2024-04-17 NOTE — PROCEDURE
[Pacemaker] : pacemaker [DDD] : DDD [Voltage: ___ volts] : Voltage was [unfilled] volts [Magnet Rate: ___ Ppm] : magnet rate was [unfilled] Ppm [Normal] : The battery status is normal. [Lead Imp:  ___ohms] : lead impedance was [unfilled] ohms [Sensing Amplitude ___mv] : sensing amplitude was [unfilled] mv [___V @] : [unfilled] V [___ ms] : [unfilled] ms [Auto Capture "On"] : auto capture was switched "On" [Programmed for Longevity] : output reprogrammed for improved battery longevity [Apace-Vpace ___ %] : Apace-Vpace [unfilled]% [de-identified] : AS  @ 89 bpm [de-identified] : [employer] [de-identified] : [id number] [de-identified] : [id number] [de-identified] : 12- [de-identified] : 60/130 [de-identified] : 8.9-9.4 years [de-identified] : Auto Capture turned ON for RA. [de-identified] : AP 27%  >99% AT/AF burden <1% 8 AMS episodes, longest lasting 9 minutes 18 seconds on 01/14/2024. Pt transmitting on Merlin.

## 2024-04-30 DIAGNOSIS — I48.0 PAROXYSMAL ATRIAL FIBRILLATION: ICD-10-CM

## 2024-04-30 RX ORDER — APIXABAN 5 MG/1
5 TABLET, FILM COATED ORAL
Qty: 180 | Refills: 3 | Status: ACTIVE | COMMUNITY
Start: 2024-04-30 | End: 1900-01-01

## 2024-06-03 NOTE — PRE-ANESTHESIA EVALUATION ADULT - MALLAMPATI CLASS
- Rest.    - Drink plenty of fluids.    - Acetaminophen (tylenol) or Ibuprofen (advil,motrin) as directed as needed for fever/pain. Avoid tylenol if you have a history of liver disease. Do not take ibuprofen if you have a history of GI bleeding, kidney disease, or if you take blood thinners.     - Follow up with your PCP or specialty clinic as directed in the next 1-2 weeks if not improved or as needed.  You can call [phone] to schedule an appointment with the appropriate provider.    - Go to the ER or seek medical attention immediately if you develop new or worsening symptoms.     - You must understand that you have received an Urgent Care treatment only and that you may be released before all of your medical problems are known or treated.   - You, the patient, will arrange for follow up care as instructed.   - If your condition worsens or fails to improve we recommend that you receive another evaluation at the ER immediately or contact your PCP to discuss your concerns or return here.    
Class II - visualization of the soft palate, fauces, and uvula

## 2024-06-19 ENCOUNTER — APPOINTMENT (OUTPATIENT)
Dept: ELECTROPHYSIOLOGY | Facility: CLINIC | Age: 64
End: 2024-06-19

## 2024-06-19 VITALS
DIASTOLIC BLOOD PRESSURE: 80 MMHG | HEART RATE: 65 BPM | BODY MASS INDEX: 20.17 KG/M2 | SYSTOLIC BLOOD PRESSURE: 130 MMHG | WEIGHT: 127 LBS | HEIGHT: 66.5 IN

## 2024-06-19 PROCEDURE — G2211 COMPLEX E/M VISIT ADD ON: CPT

## 2024-06-19 PROCEDURE — 99214 OFFICE O/P EST MOD 30 MIN: CPT

## 2024-06-19 PROCEDURE — 93000 ELECTROCARDIOGRAM COMPLETE: CPT | Mod: 59

## 2024-06-19 PROCEDURE — 93280 PM DEVICE PROGR EVAL DUAL: CPT

## 2024-07-01 NOTE — PROCEDURE
[Pacemaker] : pacemaker [DDD] : DDD [Voltage: ___ volts] : Voltage was [unfilled] volts [Magnet Rate: ___ Ppm] : magnet rate was [unfilled] Ppm [Normal] : The battery status is normal. [Lead Imp:  ___ohms] : lead impedance was [unfilled] ohms [Sensing Amplitude ___mv] : sensing amplitude was [unfilled] mv [___V @] : [unfilled] V [___ ms] : [unfilled] ms [None] : none [Programmed for Longevity] : output reprogrammed for improved battery longevity [Apace-Vpace ___ %] : Apace-Vpace [unfilled]% [de-identified] : AS  @ 89 bpm [de-identified] : [employer] [de-identified] : [id number] [de-identified] : [id number] [de-identified] : 12- [de-identified] : 60/130 [de-identified] : 8.5-9.0 years [de-identified] : AP 30%  >99% AT/AF burden <1% 1 AMS episode lasting 48s. Pt transmitting on Merlin.

## 2024-07-01 NOTE — HISTORY OF PRESENT ILLNESS
[de-identified] : Ms. Wren is a 63-year-old female with PMHx of DM type 2, manic depressive disorder, smoker, presented with complete heart block s/p DC-PPM (SJM, 23, dependent, LBB pacing), is here for follow-up.     Accompanied by her .   Feels better.     04/17/2024: Feels fine. Accompanied by her . Found to have AF on device. Is here for discussion.   06/19/2024: Feels fine. Denies any complaints. Not interested in anticoagulation.    Denies chest pain, shortness of breath, palpitation, dizziness or LOC except noted above.  EKG (06/19/2024): SR-   EKG (04/17/2024): SR- @ 80, , , QTc 483  EKG (01/03/2024): SR- @ 83, , , QTc 502  Echo (12/2023): EF 65-70%, mild LAE, mild to moderate MR, mild TR.   Cardio: none

## 2024-07-01 NOTE — ASSESSMENT
[FreeTextEntry1] : ## Complete Heart Block s/p DC-PPM (SJM, 23, dependent, LBB pacing)   # HTN    ## PAF  - PPM interrogation shows normally functioning DC-PPM. Battery life ok. No new events. - PET scan reviewed with the patient. No significant sarcoidosis. She has something on lateral wall which cannot explain heart block. In this scenario, we will continue with current observation.  - She had episodes of AF. Asymptomatic. CHADSVASC 2+ (HTN, female).   - Discussed risk of thromboembolism associated with atrial fibrillation, risk reduction with anticoagulation, and bleeding associated with oral anticoagulation. After discussing pros-cons, they opted to start OAC. We will start Eliquis 5 mg PO q12h. - I also discussed with her the possibility of CATALYST-AF trial, which will investigate DOAC versus Amulet. She is interested to proceed. Will be in touch with her as soon as we can begin enrolling officially.   - BP a little high today. Will continue BP monitoring with lifestyle modifications. Smoking cessation.   - Continue with Carvedilol 3.125 mg and Amlodipine 10 mg.   - BP diary at home.   - Remote monitoring.   - RTC in 6 months.

## 2024-07-01 NOTE — ADDENDUM
[FreeTextEntry1] : Joanna LOVELL assisted in documentation on 07/01/2024 acting as a scribe for Dr. Hans Byrnes.

## 2024-07-05 ENCOUNTER — OUTPATIENT (OUTPATIENT)
Dept: OUTPATIENT SERVICES | Facility: HOSPITAL | Age: 64
LOS: 1 days | End: 2024-07-05
Payer: MEDICARE

## 2024-07-05 DIAGNOSIS — R92.8 OTHER ABNORMAL AND INCONCLUSIVE FINDINGS ON DIAGNOSTIC IMAGING OF BREAST: ICD-10-CM

## 2024-07-05 DIAGNOSIS — Z90.710 ACQUIRED ABSENCE OF BOTH CERVIX AND UTERUS: Chronic | ICD-10-CM

## 2024-07-05 PROCEDURE — 77065 DX MAMMO INCL CAD UNI: CPT | Mod: 26,RT

## 2024-07-05 PROCEDURE — G0279: CPT | Mod: 26

## 2024-07-05 PROCEDURE — G0279: CPT

## 2024-07-05 PROCEDURE — 77065 DX MAMMO INCL CAD UNI: CPT | Mod: RT

## 2024-07-06 DIAGNOSIS — R92.8 OTHER ABNORMAL AND INCONCLUSIVE FINDINGS ON DIAGNOSTIC IMAGING OF BREAST: ICD-10-CM

## 2024-10-09 ENCOUNTER — APPOINTMENT (OUTPATIENT)
Dept: CARDIOLOGY | Facility: CLINIC | Age: 64
End: 2024-10-09
Payer: MEDICARE

## 2024-10-09 ENCOUNTER — NON-APPOINTMENT (OUTPATIENT)
Age: 64
End: 2024-10-09

## 2024-10-09 PROCEDURE — 93294 REM INTERROG EVL PM/LDLS PM: CPT

## 2024-10-09 PROCEDURE — 93296 REM INTERROG EVL PM/IDS: CPT

## 2024-10-21 NOTE — PATIENT PROFILE ADULT - IS THERE A SUSPICION OF ABUSE/NEGLIGENCE?
Patient arrived to ED via EMS with reports of a syncopal episode this morning. Patient states he's feeling dizzy/lightheaded and has a history of vertigo. Patient did hit his head on the bed, is taking a blood thinner.   no

## 2024-11-06 ENCOUNTER — EMERGENCY (EMERGENCY)
Facility: HOSPITAL | Age: 64
LOS: 0 days | Discharge: HOME | End: 2024-11-06
Attending: STUDENT IN AN ORGANIZED HEALTH CARE EDUCATION/TRAINING PROGRAM
Payer: MEDICAID

## 2024-11-06 VITALS
HEART RATE: 123 BPM | WEIGHT: 130.07 LBS | TEMPERATURE: 99 F | OXYGEN SATURATION: 97 % | RESPIRATION RATE: 18 BRPM | DIASTOLIC BLOOD PRESSURE: 91 MMHG | SYSTOLIC BLOOD PRESSURE: 160 MMHG

## 2024-11-06 VITALS — HEART RATE: 74 BPM

## 2024-11-06 DIAGNOSIS — I48.91 UNSPECIFIED ATRIAL FIBRILLATION: ICD-10-CM

## 2024-11-06 DIAGNOSIS — E11.9 TYPE 2 DIABETES MELLITUS WITHOUT COMPLICATIONS: ICD-10-CM

## 2024-11-06 DIAGNOSIS — Z79.01 LONG TERM (CURRENT) USE OF ANTICOAGULANTS: ICD-10-CM

## 2024-11-06 DIAGNOSIS — Z95.0 PRESENCE OF CARDIAC PACEMAKER: ICD-10-CM

## 2024-11-06 DIAGNOSIS — Z88.0 ALLERGY STATUS TO PENICILLIN: ICD-10-CM

## 2024-11-06 DIAGNOSIS — F20.9 SCHIZOPHRENIA, UNSPECIFIED: ICD-10-CM

## 2024-11-06 DIAGNOSIS — Z90.710 ACQUIRED ABSENCE OF BOTH CERVIX AND UTERUS: Chronic | ICD-10-CM

## 2024-11-06 DIAGNOSIS — N39.0 URINARY TRACT INFECTION, SITE NOT SPECIFIED: ICD-10-CM

## 2024-11-06 DIAGNOSIS — R31.9 HEMATURIA, UNSPECIFIED: ICD-10-CM

## 2024-11-06 LAB
ALBUMIN SERPL ELPH-MCNC: 4.3 G/DL — SIGNIFICANT CHANGE UP (ref 3.5–5.2)
ALP SERPL-CCNC: 84 U/L — SIGNIFICANT CHANGE UP (ref 30–115)
ALT FLD-CCNC: 7 U/L — SIGNIFICANT CHANGE UP (ref 0–41)
ANION GAP SERPL CALC-SCNC: 9 MMOL/L — SIGNIFICANT CHANGE UP (ref 7–14)
APPEARANCE UR: ABNORMAL
APTT BLD: 31.8 SEC — SIGNIFICANT CHANGE UP (ref 27–39.2)
AST SERPL-CCNC: 10 U/L — SIGNIFICANT CHANGE UP (ref 0–41)
BACTERIA # UR AUTO: ABNORMAL /HPF
BASOPHILS # BLD AUTO: 0.05 K/UL — SIGNIFICANT CHANGE UP (ref 0–0.2)
BASOPHILS NFR BLD AUTO: 0.6 % — SIGNIFICANT CHANGE UP (ref 0–1)
BILIRUB SERPL-MCNC: 0.3 MG/DL — SIGNIFICANT CHANGE UP (ref 0.2–1.2)
BILIRUB UR-MCNC: NEGATIVE — SIGNIFICANT CHANGE UP
BLD GP AB SCN SERPL QL: SIGNIFICANT CHANGE UP
BUN SERPL-MCNC: 15 MG/DL — SIGNIFICANT CHANGE UP (ref 10–20)
CALCIUM SERPL-MCNC: 9.8 MG/DL — SIGNIFICANT CHANGE UP (ref 8.4–10.5)
CAST: 8 /LPF — HIGH (ref 0–4)
CHLORIDE SERPL-SCNC: 103 MMOL/L — SIGNIFICANT CHANGE UP (ref 98–110)
CO2 SERPL-SCNC: 28 MMOL/L — SIGNIFICANT CHANGE UP (ref 17–32)
COLOR SPEC: ABNORMAL
CREAT SERPL-MCNC: 0.7 MG/DL — SIGNIFICANT CHANGE UP (ref 0.7–1.5)
DIFF PNL FLD: ABNORMAL
EGFR: 97 ML/MIN/1.73M2 — SIGNIFICANT CHANGE UP
EOSINOPHIL # BLD AUTO: 0.12 K/UL — SIGNIFICANT CHANGE UP (ref 0–0.7)
EOSINOPHIL NFR BLD AUTO: 1.5 % — SIGNIFICANT CHANGE UP (ref 0–8)
GLUCOSE SERPL-MCNC: 125 MG/DL — HIGH (ref 70–99)
GLUCOSE UR QL: NEGATIVE MG/DL — SIGNIFICANT CHANGE UP
HCT VFR BLD CALC: 44.5 % — SIGNIFICANT CHANGE UP (ref 37–47)
HGB BLD-MCNC: 14.9 G/DL — SIGNIFICANT CHANGE UP (ref 12–16)
IMM GRANULOCYTES NFR BLD AUTO: 0.3 % — SIGNIFICANT CHANGE UP (ref 0.1–0.3)
INR BLD: 0.97 RATIO — SIGNIFICANT CHANGE UP (ref 0.65–1.3)
KETONES UR-MCNC: NEGATIVE MG/DL — SIGNIFICANT CHANGE UP
LEUKOCYTE ESTERASE UR-ACNC: ABNORMAL
LYMPHOCYTES # BLD AUTO: 2.23 K/UL — SIGNIFICANT CHANGE UP (ref 1.2–3.4)
LYMPHOCYTES # BLD AUTO: 28.7 % — SIGNIFICANT CHANGE UP (ref 20.5–51.1)
MCHC RBC-ENTMCNC: 32.3 PG — HIGH (ref 27–31)
MCHC RBC-ENTMCNC: 33.5 G/DL — SIGNIFICANT CHANGE UP (ref 32–37)
MCV RBC AUTO: 96.5 FL — SIGNIFICANT CHANGE UP (ref 81–99)
MONOCYTES # BLD AUTO: 0.58 K/UL — SIGNIFICANT CHANGE UP (ref 0.1–0.6)
MONOCYTES NFR BLD AUTO: 7.5 % — SIGNIFICANT CHANGE UP (ref 1.7–9.3)
NEUTROPHILS # BLD AUTO: 4.78 K/UL — SIGNIFICANT CHANGE UP (ref 1.4–6.5)
NEUTROPHILS NFR BLD AUTO: 61.4 % — SIGNIFICANT CHANGE UP (ref 42.2–75.2)
NITRITE UR-MCNC: POSITIVE
NRBC # BLD: 0 /100 WBCS — SIGNIFICANT CHANGE UP (ref 0–0)
PH UR: 7 — SIGNIFICANT CHANGE UP (ref 5–8)
PLATELET # BLD AUTO: 276 K/UL — SIGNIFICANT CHANGE UP (ref 130–400)
PMV BLD: 8.9 FL — SIGNIFICANT CHANGE UP (ref 7.4–10.4)
POTASSIUM SERPL-MCNC: 4.5 MMOL/L — SIGNIFICANT CHANGE UP (ref 3.5–5)
POTASSIUM SERPL-SCNC: 4.5 MMOL/L — SIGNIFICANT CHANGE UP (ref 3.5–5)
PROT SERPL-MCNC: 7.2 G/DL — SIGNIFICANT CHANGE UP (ref 6–8)
PROT UR-MCNC: 30 MG/DL
PROTHROM AB SERPL-ACNC: 11.4 SEC — SIGNIFICANT CHANGE UP (ref 9.95–12.87)
RBC # BLD: 4.61 M/UL — SIGNIFICANT CHANGE UP (ref 4.2–5.4)
RBC # FLD: 12.7 % — SIGNIFICANT CHANGE UP (ref 11.5–14.5)
RBC CASTS # UR COMP ASSIST: >1900 /HPF — HIGH (ref 0–4)
SODIUM SERPL-SCNC: 140 MMOL/L — SIGNIFICANT CHANGE UP (ref 135–146)
SP GR SPEC: 1.02 — SIGNIFICANT CHANGE UP (ref 1–1.03)
SQUAMOUS # UR AUTO: 5 /HPF — SIGNIFICANT CHANGE UP (ref 0–5)
UROBILINOGEN FLD QL: 1 MG/DL — SIGNIFICANT CHANGE UP (ref 0.2–1)
WBC # BLD: 7.78 K/UL — SIGNIFICANT CHANGE UP (ref 4.8–10.8)
WBC # FLD AUTO: 7.78 K/UL — SIGNIFICANT CHANGE UP (ref 4.8–10.8)
WBC UR QL: 45 /HPF — HIGH (ref 0–5)

## 2024-11-06 PROCEDURE — 87086 URINE CULTURE/COLONY COUNT: CPT

## 2024-11-06 PROCEDURE — 86900 BLOOD TYPING SEROLOGIC ABO: CPT

## 2024-11-06 PROCEDURE — 99284 EMERGENCY DEPT VISIT MOD MDM: CPT

## 2024-11-06 PROCEDURE — 99284 EMERGENCY DEPT VISIT MOD MDM: CPT | Mod: 25

## 2024-11-06 PROCEDURE — 85610 PROTHROMBIN TIME: CPT

## 2024-11-06 PROCEDURE — 86901 BLOOD TYPING SEROLOGIC RH(D): CPT

## 2024-11-06 PROCEDURE — 85025 COMPLETE CBC W/AUTO DIFF WBC: CPT

## 2024-11-06 PROCEDURE — 80053 COMPREHEN METABOLIC PANEL: CPT

## 2024-11-06 PROCEDURE — 36415 COLL VENOUS BLD VENIPUNCTURE: CPT

## 2024-11-06 PROCEDURE — 85730 THROMBOPLASTIN TIME PARTIAL: CPT

## 2024-11-06 PROCEDURE — 81001 URINALYSIS AUTO W/SCOPE: CPT

## 2024-11-06 PROCEDURE — 86850 RBC ANTIBODY SCREEN: CPT

## 2024-11-06 PROCEDURE — 87186 SC STD MICRODIL/AGAR DIL: CPT

## 2024-11-06 PROCEDURE — 96374 THER/PROPH/DIAG INJ IV PUSH: CPT

## 2024-11-06 RX ORDER — LEVOFLOXACIN 750 MG/1
1 TABLET, FILM COATED ORAL
Qty: 6 | Refills: 0
Start: 2024-11-06 | End: 2024-11-11

## 2024-11-06 RX ADMIN — Medication 1000 MILLILITER(S): at 11:26

## 2024-11-06 NOTE — ED PROVIDER NOTE - CARE PROVIDER_API CALL
Luis Joyner  Internal Medicine  [address] [place] [id number]  Phone: [phone]  Fax: [phone]  Follow Up Time: 1-3 Days

## 2024-11-06 NOTE — ED PROVIDER NOTE - PATIENT PORTAL LINK FT
You can access the FollowMyHealth Patient Portal offered by [facility] by registering at the following website: [email or web address]. By joining [facility]’s FollowMyHealth portal, you will also be able to view your health information using other applications (apps) compatible with our system.

## 2024-11-06 NOTE — ED PROVIDER NOTE - CARE PROVIDERS DIRECT ADDRESSES
[email or web address]

## 2024-11-06 NOTE — ED PROVIDER NOTE - OBJECTIVE STATEMENT
62yo female with PMHx schizophrenia, resident of [facility], AFIB on Eliquis, present noting hematuria since yesterday morning, associated with frequency and urgency. Pt discontinued her Eliquis dosing last night and this morning, assuming blood in urine was due to anticoagulation. Pt denies fever, chills, flank or abdominal pain. Denies any past h/o UTIs or kidney stones. Denies any specific aggravating or relieving factors

## 2024-11-06 NOTE — ED PROVIDER NOTE - PHYSICAL EXAMINATION
CONST: Unkempt appearing in NAD  EYES: PERRL, EOMI, Sclera and conjunctiva clear.   ENT: Oropharynx normal appearing, no erythema or exudates. Uvula midline.  CARD: Normal S1 S2; Normal rate and rhythm  RESP: Equal BS B/L, No wheezes, rhonchi or rales. No distress  GI: Soft, non-tender, non-distended. No CVAT  MS: Normal ROM in all extremities. No midline spinal tenderness.  SKIN: Warm, dry, no acute rashes. Good turgor  NEURO: A&Ox3, No focal deficits. Strength 5/5 with no sensory deficits. Steady gait

## 2024-11-06 NOTE — ED PROVIDER NOTE - CLINICAL SUMMARY MEDICAL DECISION MAKING FREE TEXT BOX
62 yo female, PMHx of manic depressive disorder, type II diabetes, A-fib on Eliquis, status post pacemaker, presenting for hematuria.  She states she noted blood in her urine yesterday so she has not taken her Eliquis since last night.  Also endorses urinary frequency and urgency.  Denies fevers, chills, abdominal pain, nausea, vomiting, back pain, dysuria.  Disheveled appearance.  In no acute distress.  Abdomen soft nondistended nontender.  No CVA tenderness.  Labs ordered and reviewed.  Additional history obtained from nurse from patient's facility at bedside. 64 yo female, PMHx of manic depressive disorder, type II diabetes, A-fib on Eliquis, status post pacemaker, presenting for hematuria.  She states she noted blood in her urine yesterday so she has not taken her Eliquis since last night.  Also endorses urinary frequency and urgency.  Denies fevers, chills, abdominal pain, nausea, vomiting, back pain, dysuria.  Disheveled appearance.  In no acute distress.  Abdomen soft nondistended nontender.  No CVA tenderness.  Labs ordered and reviewed.  Additional history obtained from nurse from patient's facility at bedside.  UA +UTI.  discussed all results.  Antibiotic given and sent to pharmacy.  Discussed return precautions and follow up outpatient.  Patient and nurse comfortable with plan.

## 2024-12-18 ENCOUNTER — APPOINTMENT (OUTPATIENT)
Dept: ELECTROPHYSIOLOGY | Facility: CLINIC | Age: 64
End: 2024-12-18
Payer: MEDICARE

## 2024-12-18 ENCOUNTER — APPOINTMENT (OUTPATIENT)
Dept: ELECTROPHYSIOLOGY | Facility: CLINIC | Age: 64
End: 2024-12-18

## 2024-12-18 VITALS
DIASTOLIC BLOOD PRESSURE: 80 MMHG | HEIGHT: 66.5 IN | SYSTOLIC BLOOD PRESSURE: 160 MMHG | BODY MASS INDEX: 20.65 KG/M2 | WEIGHT: 130 LBS | HEART RATE: 100 BPM

## 2024-12-18 PROCEDURE — 99214 OFFICE O/P EST MOD 30 MIN: CPT

## 2024-12-18 PROCEDURE — G2211 COMPLEX E/M VISIT ADD ON: CPT

## 2024-12-18 PROCEDURE — 93280 PM DEVICE PROGR EVAL DUAL: CPT

## 2024-12-18 PROCEDURE — 93000 ELECTROCARDIOGRAM COMPLETE: CPT | Mod: 59

## 2024-12-18 RX ORDER — EZETIMIBE 10 MG/1
10 TABLET ORAL DAILY
Refills: 0 | Status: ACTIVE | COMMUNITY

## 2024-12-18 RX ORDER — GLUCOSAMINE/MSM/CHONDROIT SULF 500-166.6
10 TABLET ORAL
Refills: 0 | Status: ACTIVE | COMMUNITY

## 2025-01-02 ENCOUNTER — INPATIENT (INPATIENT)
Facility: HOSPITAL | Age: 65
LOS: 4 days | Discharge: NURSING, REHAB OR LONG-TERM CARE | DRG: 536 | End: 2025-01-07
Attending: INTERNAL MEDICINE | Admitting: STUDENT IN AN ORGANIZED HEALTH CARE EDUCATION/TRAINING PROGRAM
Payer: MEDICARE

## 2025-01-02 VITALS
DIASTOLIC BLOOD PRESSURE: 77 MMHG | TEMPERATURE: 99 F | HEART RATE: 104 BPM | SYSTOLIC BLOOD PRESSURE: 119 MMHG | OXYGEN SATURATION: 94 % | RESPIRATION RATE: 18 BRPM

## 2025-01-02 DIAGNOSIS — S72.001A FRACTURE OF UNSPECIFIED PART OF NECK OF RIGHT FEMUR, INITIAL ENCOUNTER FOR CLOSED FRACTURE: ICD-10-CM

## 2025-01-02 DIAGNOSIS — Z90.710 ACQUIRED ABSENCE OF BOTH CERVIX AND UTERUS: Chronic | ICD-10-CM

## 2025-01-02 LAB
ALBUMIN SERPL ELPH-MCNC: 4.6 G/DL — SIGNIFICANT CHANGE UP (ref 3.5–5.2)
ALP SERPL-CCNC: 78 U/L — SIGNIFICANT CHANGE UP (ref 30–115)
ALT FLD-CCNC: 15 U/L — SIGNIFICANT CHANGE UP (ref 0–41)
ANION GAP SERPL CALC-SCNC: 15 MMOL/L — HIGH (ref 7–14)
APTT BLD: 31 SEC — SIGNIFICANT CHANGE UP (ref 27–39.2)
AST SERPL-CCNC: 23 U/L — SIGNIFICANT CHANGE UP (ref 0–41)
BILIRUB SERPL-MCNC: 0.5 MG/DL — SIGNIFICANT CHANGE UP (ref 0.2–1.2)
BUN SERPL-MCNC: 15 MG/DL — SIGNIFICANT CHANGE UP (ref 10–20)
CALCIUM SERPL-MCNC: 8.9 MG/DL — SIGNIFICANT CHANGE UP (ref 8.4–10.5)
CHLORIDE SERPL-SCNC: 98 MMOL/L — SIGNIFICANT CHANGE UP (ref 98–110)
CO2 SERPL-SCNC: 24 MMOL/L — SIGNIFICANT CHANGE UP (ref 17–32)
CREAT SERPL-MCNC: 0.8 MG/DL — SIGNIFICANT CHANGE UP (ref 0.7–1.5)
EGFR: 82 ML/MIN/1.73M2 — SIGNIFICANT CHANGE UP
GLUCOSE SERPL-MCNC: 185 MG/DL — HIGH (ref 70–99)
HCT VFR BLD CALC: 45.4 % — SIGNIFICANT CHANGE UP (ref 37–47)
HGB BLD-MCNC: 15.5 G/DL — SIGNIFICANT CHANGE UP (ref 12–16)
INR BLD: 1.13 RATIO — SIGNIFICANT CHANGE UP (ref 0.65–1.3)
MCHC RBC-ENTMCNC: 32.9 PG — HIGH (ref 27–31)
MCHC RBC-ENTMCNC: 34.1 G/DL — SIGNIFICANT CHANGE UP (ref 32–37)
MCV RBC AUTO: 96.4 FL — SIGNIFICANT CHANGE UP (ref 81–99)
NRBC # BLD: 0 /100 WBCS — SIGNIFICANT CHANGE UP (ref 0–0)
PLATELET # BLD AUTO: 145 K/UL — SIGNIFICANT CHANGE UP (ref 130–400)
PMV BLD: 9.8 FL — SIGNIFICANT CHANGE UP (ref 7.4–10.4)
POTASSIUM SERPL-MCNC: 3.8 MMOL/L — SIGNIFICANT CHANGE UP (ref 3.5–5)
POTASSIUM SERPL-SCNC: 3.8 MMOL/L — SIGNIFICANT CHANGE UP (ref 3.5–5)
PROT SERPL-MCNC: 7.2 G/DL — SIGNIFICANT CHANGE UP (ref 6–8)
PROTHROM AB SERPL-ACNC: 13.4 SEC — HIGH (ref 9.95–12.87)
RBC # BLD: 4.71 M/UL — SIGNIFICANT CHANGE UP (ref 4.2–5.4)
RBC # FLD: 12.9 % — SIGNIFICANT CHANGE UP (ref 11.5–14.5)
SODIUM SERPL-SCNC: 137 MMOL/L — SIGNIFICANT CHANGE UP (ref 135–146)
WBC # BLD: 9.63 K/UL — SIGNIFICANT CHANGE UP (ref 4.8–10.8)
WBC # FLD AUTO: 9.63 K/UL — SIGNIFICANT CHANGE UP (ref 4.8–10.8)

## 2025-01-02 PROCEDURE — 72125 CT NECK SPINE W/O DYE: CPT | Mod: 26,MC

## 2025-01-02 PROCEDURE — 97116 GAIT TRAINING THERAPY: CPT | Mod: GP

## 2025-01-02 PROCEDURE — 97163 PT EVAL HIGH COMPLEX 45 MIN: CPT | Mod: GP

## 2025-01-02 PROCEDURE — 97166 OT EVAL MOD COMPLEX 45 MIN: CPT | Mod: GO

## 2025-01-02 PROCEDURE — C1776: CPT

## 2025-01-02 PROCEDURE — 84100 ASSAY OF PHOSPHORUS: CPT

## 2025-01-02 PROCEDURE — C9399: CPT

## 2025-01-02 PROCEDURE — 80307 DRUG TEST PRSMV CHEM ANLYZR: CPT

## 2025-01-02 PROCEDURE — 82306 VITAMIN D 25 HYDROXY: CPT

## 2025-01-02 PROCEDURE — 36415 COLL VENOUS BLD VENIPUNCTURE: CPT

## 2025-01-02 PROCEDURE — 83036 HEMOGLOBIN GLYCOSYLATED A1C: CPT

## 2025-01-02 PROCEDURE — 88305 TISSUE EXAM BY PATHOLOGIST: CPT

## 2025-01-02 PROCEDURE — 74177 CT ABD & PELVIS W/CONTRAST: CPT | Mod: 26,MC

## 2025-01-02 PROCEDURE — 88311 DECALCIFY TISSUE: CPT

## 2025-01-02 PROCEDURE — 71045 X-RAY EXAM CHEST 1 VIEW: CPT | Mod: 26

## 2025-01-02 PROCEDURE — 72170 X-RAY EXAM OF PELVIS: CPT

## 2025-01-02 PROCEDURE — 70450 CT HEAD/BRAIN W/O DYE: CPT | Mod: 26,MC

## 2025-01-02 PROCEDURE — 99223 1ST HOSP IP/OBS HIGH 75: CPT

## 2025-01-02 PROCEDURE — 99285 EMERGENCY DEPT VISIT HI MDM: CPT

## 2025-01-02 PROCEDURE — 85027 COMPLETE CBC AUTOMATED: CPT

## 2025-01-02 PROCEDURE — 85025 COMPLETE CBC W/AUTO DIFF WBC: CPT

## 2025-01-02 PROCEDURE — 73590 X-RAY EXAM OF LOWER LEG: CPT | Mod: 26,RT

## 2025-01-02 PROCEDURE — 82962 GLUCOSE BLOOD TEST: CPT

## 2025-01-02 PROCEDURE — 80053 COMPREHEN METABOLIC PANEL: CPT

## 2025-01-02 PROCEDURE — 97110 THERAPEUTIC EXERCISES: CPT | Mod: GP

## 2025-01-02 PROCEDURE — 73502 X-RAY EXAM HIP UNI 2-3 VIEWS: CPT | Mod: 26,RT

## 2025-01-02 PROCEDURE — 99222 1ST HOSP IP/OBS MODERATE 55: CPT | Mod: 57,GC

## 2025-01-02 PROCEDURE — 97530 THERAPEUTIC ACTIVITIES: CPT | Mod: GP

## 2025-01-02 PROCEDURE — 71260 CT THORAX DX C+: CPT | Mod: 26,MC

## 2025-01-02 PROCEDURE — 83735 ASSAY OF MAGNESIUM: CPT

## 2025-01-02 PROCEDURE — 73552 X-RAY EXAM OF FEMUR 2/>: CPT | Mod: 26,RT

## 2025-01-02 PROCEDURE — 93280 PM DEVICE PROGR EVAL DUAL: CPT

## 2025-01-02 RX ORDER — DEXTROSE MONOHYDRATE 25 G/50ML
15 INJECTION, SOLUTION INTRAVENOUS ONCE
Refills: 0 | Status: DISCONTINUED | OUTPATIENT
Start: 2025-01-02 | End: 2025-01-03

## 2025-01-02 RX ORDER — DEXTROSE MONOHYDRATE 25 G/50ML
25 INJECTION, SOLUTION INTRAVENOUS ONCE
Refills: 0 | Status: DISCONTINUED | OUTPATIENT
Start: 2025-01-02 | End: 2025-01-03

## 2025-01-02 RX ORDER — ACETAMINOPHEN 80 MG/.8ML
650 SOLUTION/ DROPS ORAL EVERY 6 HOURS
Refills: 0 | Status: DISCONTINUED | OUTPATIENT
Start: 2025-01-02 | End: 2025-01-03

## 2025-01-02 RX ORDER — SODIUM CHLORIDE 9 MG/ML
1000 INJECTION, SOLUTION INTRAVENOUS
Refills: 0 | Status: DISCONTINUED | OUTPATIENT
Start: 2025-01-02 | End: 2025-01-02

## 2025-01-02 RX ORDER — POTASSIUM CHLORIDE 600 MG/1
40 TABLET, FILM COATED, EXTENDED RELEASE ORAL EVERY 4 HOURS
Refills: 0 | Status: COMPLETED | OUTPATIENT
Start: 2025-01-02 | End: 2025-01-03

## 2025-01-02 RX ORDER — GLUCAGON INJECTION, SOLUTION 0.5 MG/.1ML
1 INJECTION, SOLUTION SUBCUTANEOUS ONCE
Refills: 0 | Status: DISCONTINUED | OUTPATIENT
Start: 2025-01-02 | End: 2025-01-03

## 2025-01-02 RX ORDER — SODIUM CHLORIDE 9 MG/ML
1000 INJECTION, SOLUTION INTRAVENOUS
Refills: 0 | Status: DISCONTINUED | OUTPATIENT
Start: 2025-01-02 | End: 2025-01-03

## 2025-01-02 RX ORDER — ENOXAPARIN SODIUM 60 MG/.6ML
40 INJECTION INTRAVENOUS; SUBCUTANEOUS ONCE
Refills: 0 | Status: COMPLETED | OUTPATIENT
Start: 2025-01-02 | End: 2025-01-02

## 2025-01-02 RX ORDER — MORPHINE SULFATE 15 MG
2 TABLET, EXTENDED RELEASE ORAL EVERY 6 HOURS
Refills: 0 | Status: DISCONTINUED | OUTPATIENT
Start: 2025-01-02 | End: 2025-01-03

## 2025-01-02 RX ORDER — INSULIN LISPRO 100/ML
VIAL (ML) SUBCUTANEOUS
Refills: 0 | Status: DISCONTINUED | OUTPATIENT
Start: 2025-01-02 | End: 2025-01-03

## 2025-01-02 RX ORDER — OXYCODONE HCL 15 MG
5 TABLET ORAL EVERY 6 HOURS
Refills: 0 | Status: DISCONTINUED | OUTPATIENT
Start: 2025-01-02 | End: 2025-01-03

## 2025-01-02 RX ORDER — INFLUENZA A VIRUS A/WISCONSIN/588/2019 (H1N1) RECOMBINANT HEMAGGLUTININ ANTIGEN, INFLUENZA A VIRUS A/DARWIN/6/2021 (H3N2) RECOMBINANT HEMAGGLUTININ ANTIGEN, INFLUENZA B VIRUS B/AUSTRIA/1359417/2021 RECOMBINANT HEMAGGLUTININ ANTIGEN, AND INFLUENZA B VIRUS B/PHUKET/3073/2013 RECOMBINANT HEMAGGLUTININ ANTIGEN 45; 45; 45; 45 UG/.5ML; UG/.5ML; UG/.5ML; UG/.5ML
0.5 INJECTION INTRAMUSCULAR ONCE
Refills: 0 | Status: DISCONTINUED | OUTPATIENT
Start: 2025-01-02 | End: 2025-01-07

## 2025-01-02 RX ORDER — CALCIUM ACETATE 667 MG/1
667 CAPSULE ORAL
Refills: 0 | Status: DISCONTINUED | OUTPATIENT
Start: 2025-01-02 | End: 2025-01-03

## 2025-01-02 RX ORDER — LIDOCAINE 50 MG/G
1 OINTMENT TOPICAL DAILY
Refills: 0 | Status: DISCONTINUED | OUTPATIENT
Start: 2025-01-02 | End: 2025-01-03

## 2025-01-02 RX ORDER — DEXTROSE MONOHYDRATE 25 G/50ML
12.5 INJECTION, SOLUTION INTRAVENOUS ONCE
Refills: 0 | Status: DISCONTINUED | OUTPATIENT
Start: 2025-01-02 | End: 2025-01-03

## 2025-01-02 RX ORDER — SODIUM CHLORIDE 9 MG/ML
2000 INJECTION, SOLUTION INTRAVENOUS ONCE
Refills: 0 | Status: COMPLETED | OUTPATIENT
Start: 2025-01-02 | End: 2025-01-02

## 2025-01-02 RX ORDER — POTASSIUM CHLORIDE 600 MG/1
20 TABLET, FILM COATED, EXTENDED RELEASE ORAL
Refills: 0 | Status: DISCONTINUED | OUTPATIENT
Start: 2025-01-02 | End: 2025-01-02

## 2025-01-02 RX ORDER — POLYETHYLENE GLYCOL 3350 17 G/DOSE
17 POWDER (GRAM) ORAL
Refills: 0 | Status: DISCONTINUED | OUTPATIENT
Start: 2025-01-02 | End: 2025-01-03

## 2025-01-02 RX ADMIN — ACETAMINOPHEN 650 MILLIGRAM(S): 80 SOLUTION/ DROPS ORAL at 21:46

## 2025-01-02 RX ADMIN — SODIUM CHLORIDE 1000 MILLILITER(S): 9 INJECTION, SOLUTION INTRAVENOUS at 18:28

## 2025-01-02 RX ADMIN — ENOXAPARIN SODIUM 40 MILLIGRAM(S): 60 INJECTION INTRAVENOUS; SUBCUTANEOUS at 21:43

## 2025-01-02 RX ADMIN — POTASSIUM CHLORIDE 50 MILLIEQUIVALENT(S): 600 TABLET, FILM COATED, EXTENDED RELEASE ORAL at 23:01

## 2025-01-02 NOTE — H&P ADULT - ATTENDING COMMENTS
Patient seen and examined at bedside independently of the residents. I read the resident's note and agree with the plan with the additions and corrections as noted below. My note supersedes the resident's note.     REVIEW OF SYSTEMS:  negative except in HPI.     PMH: Paroxysmal A.fib (eliquis), CHB s/p PPM, HTN, DM II, Schizophrenia    FHx: Reviewed. No fhx of asthma/copd, No fhx of liver and pulmonary disease. No fhx of hematological disorder.     Physical Exam:  GEN: No acute distress. Awake, Alert and oriented x 3.   Head: Atraumatic, Normocephalic.   Eye: PEERLA. No sclera icterus. EOMI.   ENT: Normal oropharynx, no thyromegaly, no mass, no lymphadenopathy.   LUNGS: Clear to auscultation bilaterally. No wheeze/rales/crackles.   HEART: Normal. S1/S2 present. RRR. No murmur/gallops.   ABD: Soft, non-tender, non-distended. Bowel sounds present.   EXT: No pitting edema. No erythema. No tenderness.  Integumentary: No rash, No sore, No petechia.   NEURO: CN III-XII intact. Strength: 5/5 b/l ULE. Sensory intact b/l ULE.     Vital Signs Last 24 Hrs  T(C): 37.6 (2025 22:39), Max: 37.8 (2025 21:15)  T(F): 99.6 (2025 22:39), Max: 100.1 (2025 21:15)  HR: 99 (2025 22:39) (89 - 104)  BP: 157/71 (2025 22:39) (119/77 - 157/71)  BP(mean): --  RR: 18 (2025 22:39) (18 - 18)  SpO2: 92% (2025 22:39) (92% - 94%)    Parameters below as of 2025 22:39  Patient On (Oxygen Delivery Method): room air      Please see the above notes for Labs and radiology.     Assessment and Plan:     63 yo F with hx of Paroxysmal A.fib (eliquis), CHB s/p PPM, HTN, DM II, Schizophrenia presents to ED for right hip pain after unwitnessed fall.    # Right displaced subcapital femoral neck fracture   # Right nondisplaced anterior 6-7th ribs fracture  - CT abdomen shows Acute, displaced right femoral neck fracture.  - CT chest shows Acute, nondisplaced right anterior 6-7th rib fracture; no pneumothorax.  - s/p eval by Ortho in ED --> right hip hemiarthroplasty tomorrow.  - NPO after MN with IVF gentle hydration  - Pre-Op labs  - Hold eliquis and start on heparin SC for DVT ppx  - Keep 2u PRBC on hold for OR  - F/u Ortho.   - EP consult for PPM interrogation.   - monitor on Telemetry for now.     Pre-Op risk stratification  - MET > 4, RCRI score 0 (Class 1 risk). Due to advanced age and comorbidities, patient is moderate risk for moderate risk procedure.     Paroxysmal A.fib - hold eliquis for now. Ok to give lovenox SC overnight. May resume if Ok by ortho after surgery.   HTN - c/w home med.   DM II - monitor FS AC HS. insulin sliding scale.   Schizophrenia - c/w home med.    DVT ppx: Lovenox SC  GI ppx: PPI  Diet: NPO after MN  Activity: as tolerated.     Date seen by the attendin2025  I have spent 75 minutes of time on the encounter which excludes teaching and/or separately reported services.

## 2025-01-02 NOTE — H&P ADULT - ASSESSMENT
64-year-old F with PMH:   - schizophrenia, currently living at [facility] on Eliquis,     presenting to the ED after an unwitnessed ground-level fall.      Patient is coming by her , states that she came to pick her up for a PMD visit and found her outside of her apartment laying on the ground for an unknown period of time.  Patient states that she tripped and fell landing on her right hip which is causing her significant pain.  She denies LOC, denies head strike.  Of note, patient was seen in her room C 3 days ago also for ground-level fall but eloped prior to receiving any medical treatment.   states that patient had a similar episode, however patient states that at that time she had lost control of both of her legs which caused her fall.  Today, she denies shortness of breath, chest pain, abdominal pain, nausea, vomiting.  Denies recent fever, chills    In the ED   Vitals   · BP Systolic	119 mm Hg  · BP Diastolic	77 mm Hg  · Heart Rate	 104 /min  · Respiration Rate (breaths/min)	18 /min  · Temp (C)	37 Degrees C  · SpO2 (%)	94 % room air        - OP f up with endocrinology to r/o osteoporosis (osteoporotic fracture)  - start calcium and vitamin D supplementation   - check vitamin D level    64-year-old F with PMH:   - schizophrenia, currently living at [facility]  - CHB with narrow escape rhythm s/p DC PPM implant SJM  - A-fib on Eliquis  - Mild-to-moderate mitral regurgitation  - Post hysterectomy  - HTN   - DM       presenting to the ED after an unwitnessed ground-level fall; after tripping.     Patient is coming by her , states that she came to pick her up for a PMD visit and found her outside of her apartment laying on the ground for an unknown period of time.      Patient states that she tripped and fell landing on her right hip which is causing her significant pain.      She denies LOC, denies head strike.      Of note, patient was seen in her room C 3 days ago also for ground-level fall but eloped prior to receiving any medical treatment.     states that patient had a similar episode, however patient states that at that time she had lost control of both of her legs which caused her fall.      Today, she denies shortness of breath, chest pain, abdominal pain, nausea, vomiting.    Denies recent fever, chills    In the ED   Vitals   · BP Systolic	119 mm Hg  · BP Diastolic	77 mm Hg  · Heart Rate	 104 /min  · Respiration Rate (breaths/min)	18 /min  · Temp (C)	37 Degrees C  · SpO2 (%)	94 % room air    CT HEAD:  No acute intracranial findings.    Stable mild-moderate chronic microvascular ischemic changes.    CT CERVICAL SPINE:  No acute cervical fracture or facet subluxation.      CT abdomen and pelvis w IV contrast:  Acute, displaced right femoral neck fracture.  Acute, nondisplaced right anterior 6-7th rib fracture; no pneumothorax.    Xray tibia fibula: TIBIA/FIBULA: No acute displaced fracture. Osteopenia.    TTE 2023:   1. Left ventricular ejection fraction, by visual estimation, is 65 to 70%.   2. Normal global left ventricular systolic function.   3. The left ventricular diastolic function could not be assessed in this study.   4. Mildly enlarged left atrium.   5. Mild-to-moderate mitral regurgitation.   6. Mild tricuspid regurgitation.          64 y.o lady presenting for after a fall found to have acute, displaced right femoral neck fracture and acute, nondisplaced right anterior 6-7th rib fracture    #Fall, reportedly mechanical  #Acute, displaced right femoral neck fracture.  #Acute, nondisplaced right anterior 6-7th rib fracture; no pneumothorax.  - CBC, CMP within normal limits  - OP f up with endocrinology to r/o osteoporosis (osteoporotic fracture)  - start calcium and vitamin D supplementation   - check vitamin D level   - incentive neymar   - pain management:      * tylenol 650 mg [id number] standing     * lidocaine patch     * oxycodone IR 5 mg every 6 hours PRN for moderate pain     * morphine 2 mg IV every 6 hours PRN for severe pain  - bowel regimen   - NPO after MN   - D5W/LR 50 cc per hour after midnight   - DVT: SCD LLE; one dose of lovenox 40 mg; re-dose tomorrow if cleared by ortho    #CHB with narrow escape rhythm s/p DC PPM implant SJM  #A-fib on Eliquis  - EP for device interrogation (2 falls in one week)    # Bilateral subsegmental atelectasis.  #Smoking with emphysematous changes   - seen on CT   - incentive neymar    #Diet: DASH, CC; NPO after MN   #DVT pro: SCD LLE, one dose of lovenox 40 mg; re-dose tomorrow if cleared by ortho  #GI pro: pantoprazole  #Activity: as tolerated  #Dispo: med  #Med rec:   #Code status:    64 y.o lady presenting for after a fall found to have acute, displaced right femoral neck fracture and acute, nondisplaced right anterior 6-7th rib fracture    Patient doesn't know the list of her medications. she gets them from [facility].  Kindly call pharmacy in AM; currently it is closed ([phone])    #Fall, reportedly mechanical  #Acute, displaced right femoral neck fracture.  #Acute, nondisplaced right anterior 6-7th rib fracture; no pneumothorax.  - CBC, CMP within normal limits  - OP f up with endocrinology to r/o osteoporosis (osteoporotic fracture)  - start calcium and vitamin D supplementation   - check vitamin D level   - incentive neymar   - pain management:      * tylenol 650 mg [id number] standing     * lidocaine patch     * oxycodone IR 5 mg every 6 hours PRN for moderate pain     * morphine 2 mg IV every 6 hours PRN for severe pain  - bowel regimen   - NPO after MN   - D5W/LR 50 cc per hour after midnight   - DVT: SCD LLE; one dose of lovenox 40 mg; re-dose tomorrow if cleared by ortho  - patient lives alone so likely will need discharge to rehab     #CHB with narrow escape rhythm s/p DC PPM implant SJM  #A-fib on Eliquis  - ECG: V paced  - EP for device interrogation (2 falls in one week)    # Bilateral subsegmental atelectasis.  #Smoking with emphysematous changes   - seen on CT   - incentive neymar    #Smoking (nicotine)  - 1 pack per day   - offered nicotine patch -> didn't want it at this point    #DM   - sliding scale    #Diet: DASH, CC; NPO after MN   #DVT pro: SCD LLE, one dose of lovenox 40 mg; re-dose tomorrow if cleared by ortho  #GI pro: pantoprazole  #Activity: as tolerated  #Dispo: med  #Code status: full

## 2025-01-02 NOTE — ED ADULT NURSE NOTE - OBJECTIVE STATEMENT
Presented to ed from home for c/o trip and fall x3 days ago,  went to check on pt and stated she couldn't walk.

## 2025-01-02 NOTE — ED PROVIDER NOTE - CLINICAL SUMMARY MEDICAL DECISION MAKING FREE TEXT BOX
The MDM was documented by me personally, Dr. Yaakov Leonardo, supervising attending and serves as my attending contribution to the care of the patient. I have personally performed a history and physical exam on this patient and personally directed the management of the patient.    the pt is a 63 yo F with a hx of schizophrenia, a fib on eliquis, pacemaker, who lives at [facility]. pt was an unwitnessed fall. the pt states she tripped and landed on her right hip. rest of history above.    Vitals: HD stable, O2 stable  Gen: appropriate affect, no acute distress  Neuro: no focal defects, no sensory deficits  HEENT: EOMI, no JVD, normocephalic, atraumatic, no scleral icterus  Cardio: RRR, S1 S2 present, no murmurs, rubs, or gallops  Resp: lungs b/l CTA, chest wall intact  Abd: soft, nondistended, nontender  MSK: no gross joint abnormalities, no obvious swelling, pt with no external rotation of right hip or obvious appearance of leg shortening.  Skin: no rashes, no wounds  heme: no ecchymosis or petechiae      ekg reviewed by me personally, Dr. Yaakov Leonardo, EKG ventricular paced rhythm, no arrhythmias, no acute ischemic changes, no STEMI     CTH, CTCspine, CTCAP performed in light of pt being unwitnessed fall  pt found to have:   Acute, displaced right femoral neck fracture.  Acute, nondisplaced right anterior 6-7th rib fracture; no pneumothorax    labs otherwise unremarkable    orthopedics notified - they will do right hip arthroplasty during admission. trauma also evaluated pt. no acute trauma intervention, recommend incentive spirometry and pain control.   pt will be admitted to medicine and is stable for admission.

## 2025-01-02 NOTE — PATIENT PROFILE ADULT - VISION (WITH CORRECTIVE LENSES IF THE PATIENT USUALLY WEARS THEM):
Additional Notes: Patient consent was obtained to proceed with the visit and recommended plan of care after discussion of all risks and benefits, including the risks of COVID-19 exposure.
Detail Level: Simple
Normal vision: sees adequately in most situations; can see medication labels, newsprint

## 2025-01-02 NOTE — ED ADULT NURSE NOTE - NSFALLUNIVINTERV_ED_ALL_ED
Bed/Stretcher in lowest position, wheels locked, appropriate side rails in place/Call bell, personal items and telephone in reach/Instruct patient to call for assistance before getting out of bed/chair/stretcher/Non-slip footwear applied when patient is off stretcher/[place] to call system/Physically safe environment - no spills, clutter or unnecessary equipment/Purposeful proactive rounding/Room/bathroom lighting operational, light cord in reach

## 2025-01-02 NOTE — ED PROVIDER NOTE - OBJECTIVE STATEMENT
64-year-old F with PMH schizophrenia, A-fib on Eliquis, currently living at [facility] who presents to the ED after an unwitnessed ground-level fall.  Patient is coming by her , states that she came to pick her up for a PMD visit and found her outside of her apartment laying on the ground for an unknown period of time.  Patient states that she tripped and fell landing on her right hip which is causing her significant pain.  She denies LOC, denies head strike.  Of note, patient was seen in her room C 3 days ago also for ground-level fall but eloped prior to receiving any medical treatment.   states that patient had a similar episode, however patient states that at that time she had lost control of both of her legs which caused her fall.  Today, she denies shortness of breath, chest pain, abdominal pain, nausea, vomiting.  Denies recent fever, chills.

## 2025-01-02 NOTE — CONSULT NOTE ADULT - SUBJECTIVE AND OBJECTIVE BOX
ORTHOPAEDIC CONSULT NOTE    64F w/ schizophrenia, Afib on eliquis residing at [facility] presenting after unwitnessed fall. Patient was found down for unknown period of time after tripping. Pain in right hip, unable to ambulate. Denies constitutional symptoms, numbness/tingling. Ambulates with a cane at baseline, on Eliquis, no antecedent pain.    PE:  Resting comfortably    RLE:  Skin intact  TTP at groin/hip  compartments soft and compressible  Firing EHL/FHL  Sensation grossly intact  foot wwp    Imaging:   - displaced subcapital femoral neck fracture   - R ribs 6-7 fractures    A/P: 64F with a right femoral neck fracture. Discussed injury with the patient and R/B/A of treatment options. Patient agreed and will plan with operative management for her femoral neck fracture.    - NWB RLE  - Labs: CBC, CMP Type + screen, Coags  - EKG, CXR  - Medicine clearance  - diet ok, please keep NPO + IVF at midnight   - DVT: SCD, chemical ppx as per primary  - Incentive spirometry  - continue home meds   ORTHOPAEDIC CONSULT NOTE    64F w/ schizophrenia, Afib on eliquis residing at [facility] presenting after unwitnessed fall. Patient was found down for unknown period of time after tripping earlier today. Pain in right hip, unable to ambulate. Denies constitutional symptoms, numbness/tingling. Ambulates with a cane at baseline, on Eliquis, no antecedent pain.     PE:  Resting comfortably    RLE:  Skin intact  Superficial abrasion over the right hip  TTP at groin/hip  compartments soft and compressible  Firing EHL/FHL  Sensation grossly intact  foot wwp    BL UE, LLE:  skin intact  no obvious deformities  NV intact    Imaging:   - displaced subcapital femoral neck fracture   - R ribs 6-7 fractures    A/P: 64F with a right femoral neck fracture. Discussed injury with the patient and R/B/A of treatment options. Patient agreed and will plan with operative management for her femoral neck fracture.    - tentative plan for right hip hemiarthroplasty FRIDAY 1/3/25, PENDING MEDICAL CLEARANCE  - NWB RLE  - Labs: CBC, CMP Type + screen, Coags  - please keep 2 units pRBC on hold for OR  - EKG, CXR  - Medicine clearance  - diet ok, please keep NPO + IVF at midnight   - DVT: SCD, chemical ppx as per primary  - Incentive spirometry  - continue home meds

## 2025-01-02 NOTE — H&P ADULT - HISTORY OF PRESENT ILLNESS
64-year-old F with PMH:   - schizophrenia  - CHB with narrow escape rhythm s/p DC PPM implant SJM  - A-fib on Eliquis  - Mild-to-moderate mitral regurgitation  - Post hysterectomy  - HTN   - DM     presenting to the ED after an unwitnessed ground-level fall; after tripping.   Patient is coming by her , states that she came to pick her up for a PMD visit and found her outside of her apartment laying on the ground for an unknown period of time.    Patient states that she tripped and fell landing on her right hip which is causing her significant pain.    She denies LOC, denies head strike.      Of note, patient had a fall 1 week ago, also ground-level fall but eloped prior to receiving any medical treatment.  Back then she reports that she had lost control of both of her legs which caused her fall.      Today, she denies shortness of breath, chest pain, abdominal pain, nausea, vomiting.    Denies recent fever, chills    Patient is not presenting from [facility]; patient denied it and I called the [facility] and they confirmed that she is not currently admitted there.     In the ED   Vitals   · BP Systolic	119 mm Hg  · BP Diastolic	77 mm Hg  · Heart Rate	 104 /min  · Respiration Rate (breaths/min)	18 /min  · Temp (C)	37 Degrees C  · SpO2 (%)	94 % room air    CT HEAD:  No acute intracranial findings.    Stable mild-moderate chronic microvascular ischemic changes.    CT CERVICAL SPINE:  No acute cervical fracture or facet subluxation.      CT abdomen and pelvis w IV contrast:  Acute, displaced right femoral neck fracture.  Acute, nondisplaced right anterior 6-7th rib fracture; no pneumothorax.    Xray tibia fibula: TIBIA/FIBULA: No acute displaced fracture. Osteopenia.    TTE 2023:   1. Left ventricular ejection fraction, by visual estimation, is 65 to 70%.   2. Normal global left ventricular systolic function.   3. The left ventricular diastolic function could not be assessed in this study.   4. Mildly enlarged left atrium.   5. Mild-to-moderate mitral regurgitation.   6. Mild tricuspid regurgitation.

## 2025-01-02 NOTE — ED PROVIDER NOTE - PROGRESS NOTE DETAILS
[place]: Imaging shows a right femoral neck fracture and 2 right rib fractures. Rest of labs, imaging unremarkable. Patient does not want pain medication at this time. Will admit to medicine, orthopedic surgery and trauma have been consulted.

## 2025-01-02 NOTE — ED PROVIDER NOTE - PHYSICAL EXAMINATION
CONSTITUTIONAL: Disheveled, unkept   HEAD: Normocephalic; atraumatic.   EYES: PERRL; EOM intact. Conjunctiva normal B/L.   ENT: Normal pharynx with no tonsillar hypertrophy. MMM.  NECK: Non-tender C-spine   CHEST: Normal chest excursion with respiration. Non-tender to palpation.   CARDIOVASCULAR: Normal S1, S2; no murmurs, rubs, or gallops.   RESPIRATORY: Normal chest excursion with respiration; breath sounds clear and equal bilaterally; no wheezes, rhonchi, or rales.  GI/: Soft; non-distended; non-tender.  BACK: No evidence of trauma or deformity. Non-tender to palpation.   EXT: Decreased ROM at R hip joint. Full ROM at knee with pain. Palpable DP pulses b/l. Sensation intact in RLE. Rest of extremities unremarkable.   SKIN: Normal for age and race; warm; dry; good turgor.  NEURO: A & O x 3

## 2025-01-02 NOTE — CONSULT NOTE ADULT - SUBJECTIVE AND OBJECTIVE BOX
TRAUMA ACTIVATION LEVEL:  CODE / ALERT  / CONSULT  ACTIVATED BY: EMS**  /  ED**  INTUBATED: YES** / NO**      MECHANISM OF INJURY:   [] Blunt     [] MVC	  [X] Fall	  [] Pedestrian Struck	  [] Motorcycle     [] Assault     [] Bicycle collision    [] Sports injury    [] Penetrating    [] Gun Shot Wound      [] Stab Wound    GCS: 15 	E: 4	V: 5	M: 6    HPI:    64yF w/ PMHx of Afib on Eliquis, Schizophrenia seen as Trauma Consult s/p found down for unknown period of time after tripping earlier today. The patient reports that she has pain in the right hip. The patient reports no prior LOC or dizziness preceding the fall..  Trauma assessment in ED: ABCs intact , GCS 15 , AAOx3.     PAST MEDICAL & SURGICAL HISTORY:  Diabetes mellitus      Bipolar disorder in partial remission, most recent episode unspecified type  Last episodes six years ago      History of hysterectomy for malignancy          Allergies    penicillin (Other)    Intolerances        Home Medications:  atorvastatin 80 mg oral tablet: 1 tab(s) orally once a day (at bedtime) (07 Dec 2023 14:02)  melatonin 3 mg oral tablet: 1 tab(s) orally once a day (at bedtime) As needed Insomnia (07 Dec 2023 14:02)  Prolixin Decanoate 25 mg/mL injectable solution: 37.5 milligram(s) intramuscularly every 2 weeks (04 Dec 2023 12:50)      ROS: 10-system review is otherwise negative except HPI above.      Primary Survey:    A - airway intact  B - bilateral breath sounds and good chest rise  C - palpable pulses in all extremities  D - GCS 15 on arrival, MORELOS  Exposure obtained    Vital Signs Last 24 Hrs  T(C): 37 (02 Jan 2025 11:54), Max: 37 (02 Jan 2025 11:54)  T(F): 98.6 (02 Jan 2025 11:54), Max: 98.6 (02 Jan 2025 11:54)  HR: 104 (02 Jan 2025 11:54) (104 - 104)  BP: 119/77 (02 Jan 2025 11:54) (119/77 - 119/77)  BP(mean): --  RR: 18 (02 Jan 2025 11:54) (18 - 18)  SpO2: 94% (02 Jan 2025 11:54) (94% - 94%)    Parameters below as of 02 Jan 2025 11:54  Patient On (Oxygen Delivery Method): room air        Secondary Survey:   General: NAD  HEENT: Normocephalic, atraumatic, EOMI, PEERLA. no scalp lacerations   Neck: Soft, midline trachea. no c-spine tenderness  Chest: No chest wall tenderness, no subcutaneous emphysema   Cardiac: S1, S2, RRR  Respiratory: Bilateral breath sounds, clear and equal bilaterally  Abdomen: Soft, non-distended, non-tender, no rebound, no guarding.  Groin: Normal appearing, pelvis stable   Ext:  Moving b/l upper and lower extremities. Palpable Radial b/l UE, b/l DP palpable in LE.   Back: No T/L/S spine tenderness, No palpable runoff/stepoff/deformity  Rectal:  good tone    Labs:  CAPILLARY BLOOD GLUCOSE                              15.5   9.63  )-----------( 145      ( 02 Jan 2025 14:11 )             45.4         01-02    137  |  98  |  15  ----------------------------<  185[H]  3.8   |  24  |  0.8      Calcium: 8.9 mg/dL (01-02-25 @ 14:11)      LFTs:             7.2  | 0.5  | 23       ------------------[78      ( 02 Jan 2025 14:11 )  4.6  | x    | 15          Lipase:x      Amylase:x             Coags:     13.40  ----< 1.13    ( 02 Jan 2025 14:11 )     31.0                Urinalysis Basic - ( 02 Jan 2025 14:11 )    Color: x / Appearance: x / SG: x / pH: x  Gluc: 185 mg/dL / Ketone: x  / Bili: x / Urobili: x   Blood: x / Protein: x / Nitrite: x   Leuk Esterase: x / RBC: x / WBC x   Sq Epi: x / Non Sq Epi: x / Bacteria: x          RADIOLOGY & ADDITIONAL STUDIES:  ---------------------------------------------------------------------------------------  < from: CT Cervical Spine No Cont (01.02.25 @ 14:16) >    CT HEAD:  No acute intracranial findings.    Stable mild-moderate chronic microvascular ischemic changes.    CT CERVICAL SPINE:  No acute cervical fracture or facet subluxation.    < end of copied text >  < from: CT Chest w/ IV Cont (01.02.25 @ 14:16) >  IMPRESSION:  Acute, displaced right femoral neck fracture.  Acute, nondisplaced right anterior 6-7th rib fracture; no pneumothorax.    < end of copied text >   TRAUMA ACTIVATION LEVEL:  CODE / ALERT  / CONSULT  ACTIVATED BY: EMS**  /  ED**  INTUBATED: YES** / NO**      MECHANISM OF INJURY:   [] Blunt     [] MVC	  [X] Fall	  [] Pedestrian Struck	  [] Motorcycle     [] Assault     [] Bicycle collision    [] Sports injury    [] Penetrating    [] Gun Shot Wound      [] Stab Wound    GCS: 15 	E: 4	V: 5	M: 6    HPI:    64yF w/ PMHx of Afib on Eliquis, Schizophrenia seen as Trauma Consult s/p found down for unknown period of time after tripping earlier today. The patient reports that she has pain in the right hip. The patient reports no prior LOC or dizziness preceding the fall..  Trauma assessment in ED: ABCs intact , GCS 15 , AAOx3.     PAST MEDICAL & SURGICAL HISTORY:  Diabetes mellitus      Bipolar disorder in partial remission, most recent episode unspecified type  Last episodes six years ago      History of hysterectomy for malignancy          Allergies    penicillin (Other)    Intolerances        Home Medications:  atorvastatin 80 mg oral tablet: 1 tab(s) orally once a day (at bedtime) (07 Dec 2023 14:02)  melatonin 3 mg oral tablet: 1 tab(s) orally once a day (at bedtime) As needed Insomnia (07 Dec 2023 14:02)  Prolixin Decanoate 25 mg/mL injectable solution: 37.5 milligram(s) intramuscularly every 2 weeks (04 Dec 2023 12:50)      ROS: 10-system review is otherwise negative except HPI above.      Primary Survey:    A - airway intact  B - bilateral breath sounds and good chest rise  C - palpable pulses in all extremities  D - GCS 15 on arrival, MORELOS  Exposure obtained    Vital Signs Last 24 Hrs  T(C): 37 (02 Jan 2025 11:54), Max: 37 (02 Jan 2025 11:54)  T(F): 98.6 (02 Jan 2025 11:54), Max: 98.6 (02 Jan 2025 11:54)  HR: 104 (02 Jan 2025 11:54) (104 - 104)  BP: 119/77 (02 Jan 2025 11:54) (119/77 - 119/77)  BP(mean): --  RR: 18 (02 Jan 2025 11:54) (18 - 18)  SpO2: 94% (02 Jan 2025 11:54) (94% - 94%)    Parameters below as of 02 Jan 2025 11:54  Patient On (Oxygen Delivery Method): room air        Secondary Survey:   General: NAD  HEENT: Normocephalic, atraumatic, EOMI, PEERLA. no scalp lacerations   Neck: Soft, midline trachea. no c-spine tenderness  Chest: No chest wall tenderness, no subcutaneous emphysema   Cardiac: Extremities well perfused  Respiratory: Bilateral breath sounds, clear and equal bilaterally  Abdomen: Soft, non-distended, non-tender, no rebound, no guarding.  Groin: Normal appearing, pelvis stable   Ext:  Palpable Radial b/l UE, b/l DP palpable in LE. Decreased ROM in RLE 2/2 pain, R hip tenderness  Back: No T/L/S spine tenderness, No palpable runoff/stepoff/deformity      Labs:  CAPILLARY BLOOD GLUCOSE                              15.5   9.63  )-----------( 145      ( 02 Jan 2025 14:11 )             45.4         01-02    137  |  98  |  15  ----------------------------<  185[H]  3.8   |  24  |  0.8      Calcium: 8.9 mg/dL (01-02-25 @ 14:11)      LFTs:             7.2  | 0.5  | 23       ------------------[78      ( 02 Jan 2025 14:11 )  4.6  | x    | 15          Lipase:x      Amylase:x             Coags:     13.40  ----< 1.13    ( 02 Jan 2025 14:11 )     31.0                Urinalysis Basic - ( 02 Jan 2025 14:11 )    Color: x / Appearance: x / SG: x / pH: x  Gluc: 185 mg/dL / Ketone: x  / Bili: x / Urobili: x   Blood: x / Protein: x / Nitrite: x   Leuk Esterase: x / RBC: x / WBC x   Sq Epi: x / Non Sq Epi: x / Bacteria: x          RADIOLOGY & ADDITIONAL STUDIES:  ---------------------------------------------------------------------------------------  < from: CT Cervical Spine No Cont (01.02.25 @ 14:16) >    CT HEAD:  No acute intracranial findings.    Stable mild-moderate chronic microvascular ischemic changes.    CT CERVICAL SPINE:  No acute cervical fracture or facet subluxation.    < end of copied text >  < from: CT Chest w/ IV Cont (01.02.25 @ 14:16) >  IMPRESSION:  Acute, displaced right femoral neck fracture.  Acute, nondisplaced right anterior 6-7th rib fracture; no pneumothorax.    < end of copied text >

## 2025-01-02 NOTE — H&P ADULT - NSHPPHYSICALEXAM_GEN_ALL_CORE
LOS:     VITALS:   T(C): 37 (01-02-25 @ 11:54), Max: 37 (01-02-25 @ 11:54)  HR: 104 (01-02-25 @ 11:54) (104 - 104)  BP: 119/77 (01-02-25 @ 11:54) (119/77 - 119/77)  RR: 18 (01-02-25 @ 11:54) (18 - 18)  SpO2: 94% (01-02-25 @ 11:54) (94% - 94%)    GENERAL: NAD, lying in bed comfortably  HEAD:  Atraumatic, Normocephalic  EYES: EOMI, PERRLA, conjunctiva and sclera clear  ENT: Moist mucous membranes  NECK: Supple, No JVD  CHEST/LUNG: Clear to auscultation bilaterally; No rales, rhonchi, wheezing, or rubs. Unlabored respirations  HEART: Regular rate and rhythm; No murmurs, rubs, or gallops  ABDOMEN: BSx4; Soft, nontender, nondistended  EXTREMITIES:   RLE:  Skin intact  Superficial abrasion over the right hip  TTP at groin/hip  compartments soft and compressible  foot wwp    NERVOUS SYSTEM:  A&Ox3, no focal deficits   SKIN: No rashes or lesions

## 2025-01-02 NOTE — CONSULT NOTE ADULT - ATTENDING COMMENTS
orthopedic trauma attending  Patient seen and examined.  PMHx, Psurg Hx, Medications and Allergies reviewed.  X-rays and CT reviewed.  Agree with findings, assessment and plan. impression: displaced femoral neck.  unreliable patient.  recommend hemiarthroplasty.    Risks, benefits and alternative to surgical management of the patient's fracture were again reviewed with patient, her questions answered to her satisfaction and she wishes to proceed with proposed surgery today.

## 2025-01-03 ENCOUNTER — RESULT REVIEW (OUTPATIENT)
Age: 65
End: 2025-01-03

## 2025-01-03 LAB
24R-OH-CALCIDIOL SERPL-MCNC: 7 NG/ML — LOW (ref 30–80)
A1C WITH ESTIMATED AVERAGE GLUCOSE RESULT: 6.1 % — HIGH (ref 4–5.6)
ALBUMIN SERPL ELPH-MCNC: 4 G/DL — SIGNIFICANT CHANGE UP (ref 3.5–5.2)
ALP SERPL-CCNC: 61 U/L — SIGNIFICANT CHANGE UP (ref 30–115)
ALT FLD-CCNC: 12 U/L — SIGNIFICANT CHANGE UP (ref 0–41)
ANION GAP SERPL CALC-SCNC: 11 MMOL/L — SIGNIFICANT CHANGE UP (ref 7–14)
AST SERPL-CCNC: 23 U/L — SIGNIFICANT CHANGE UP (ref 0–41)
BASOPHILS # BLD AUTO: 0.02 K/UL — SIGNIFICANT CHANGE UP (ref 0–0.2)
BASOPHILS NFR BLD AUTO: 0.2 % — SIGNIFICANT CHANGE UP (ref 0–1)
BILIRUB SERPL-MCNC: 0.4 MG/DL — SIGNIFICANT CHANGE UP (ref 0.2–1.2)
BUN SERPL-MCNC: 11 MG/DL — SIGNIFICANT CHANGE UP (ref 10–20)
CALCIUM SERPL-MCNC: 8.5 MG/DL — SIGNIFICANT CHANGE UP (ref 8.4–10.5)
CHLORIDE SERPL-SCNC: 104 MMOL/L — SIGNIFICANT CHANGE UP (ref 98–110)
CO2 SERPL-SCNC: 24 MMOL/L — SIGNIFICANT CHANGE UP (ref 17–32)
CREAT SERPL-MCNC: 0.6 MG/DL — LOW (ref 0.7–1.5)
EGFR: 100 ML/MIN/1.73M2 — SIGNIFICANT CHANGE UP
EOSINOPHIL # BLD AUTO: 0 K/UL — SIGNIFICANT CHANGE UP (ref 0–0.7)
EOSINOPHIL NFR BLD AUTO: 0 % — SIGNIFICANT CHANGE UP (ref 0–8)
ESTIMATED AVERAGE GLUCOSE: 128 MG/DL — HIGH (ref 68–114)
ETHANOL SERPL-MCNC: <10 MG/DL — SIGNIFICANT CHANGE UP
GLUCOSE BLDC GLUCOMTR-MCNC: 114 MG/DL — HIGH (ref 70–99)
GLUCOSE BLDC GLUCOMTR-MCNC: 142 MG/DL — HIGH (ref 70–99)
GLUCOSE BLDC GLUCOMTR-MCNC: 145 MG/DL — HIGH (ref 70–99)
GLUCOSE BLDC GLUCOMTR-MCNC: 146 MG/DL — HIGH (ref 70–99)
GLUCOSE BLDC GLUCOMTR-MCNC: 158 MG/DL — HIGH (ref 70–99)
GLUCOSE SERPL-MCNC: 169 MG/DL — HIGH (ref 70–99)
HCT VFR BLD CALC: 40.2 % — SIGNIFICANT CHANGE UP (ref 37–47)
HCT VFR BLD CALC: 44.1 % — SIGNIFICANT CHANGE UP (ref 37–47)
HGB BLD-MCNC: 13.7 G/DL — SIGNIFICANT CHANGE UP (ref 12–16)
HGB BLD-MCNC: 14.6 G/DL — SIGNIFICANT CHANGE UP (ref 12–16)
IMM GRANULOCYTES NFR BLD AUTO: 0.3 % — SIGNIFICANT CHANGE UP (ref 0.1–0.3)
LYMPHOCYTES # BLD AUTO: 1.08 K/UL — LOW (ref 1.2–3.4)
LYMPHOCYTES # BLD AUTO: 12.5 % — LOW (ref 20.5–51.1)
MAGNESIUM SERPL-MCNC: 2.1 MG/DL — SIGNIFICANT CHANGE UP (ref 1.8–2.4)
MCHC RBC-ENTMCNC: 32.4 PG — HIGH (ref 27–31)
MCHC RBC-ENTMCNC: 32.5 PG — HIGH (ref 27–31)
MCHC RBC-ENTMCNC: 33.1 G/DL — SIGNIFICANT CHANGE UP (ref 32–37)
MCHC RBC-ENTMCNC: 34.1 G/DL — SIGNIFICANT CHANGE UP (ref 32–37)
MCV RBC AUTO: 95.5 FL — SIGNIFICANT CHANGE UP (ref 81–99)
MCV RBC AUTO: 97.8 FL — SIGNIFICANT CHANGE UP (ref 81–99)
MONOCYTES # BLD AUTO: 0.95 K/UL — HIGH (ref 0.1–0.6)
MONOCYTES NFR BLD AUTO: 11 % — HIGH (ref 1.7–9.3)
NEUTROPHILS # BLD AUTO: 6.53 K/UL — HIGH (ref 1.4–6.5)
NEUTROPHILS NFR BLD AUTO: 76 % — HIGH (ref 42.2–75.2)
NRBC # BLD: 0 /100 WBCS — SIGNIFICANT CHANGE UP (ref 0–0)
NRBC # BLD: 0 /100 WBCS — SIGNIFICANT CHANGE UP (ref 0–0)
PHOSPHATE SERPL-MCNC: 2.4 MG/DL — SIGNIFICANT CHANGE UP (ref 2.1–4.9)
PLATELET # BLD AUTO: 132 K/UL — SIGNIFICANT CHANGE UP (ref 130–400)
PLATELET # BLD AUTO: 141 K/UL — SIGNIFICANT CHANGE UP (ref 130–400)
PMV BLD: 10.1 FL — SIGNIFICANT CHANGE UP (ref 7.4–10.4)
PMV BLD: 9.5 FL — SIGNIFICANT CHANGE UP (ref 7.4–10.4)
POTASSIUM SERPL-MCNC: 4.4 MMOL/L — SIGNIFICANT CHANGE UP (ref 3.5–5)
POTASSIUM SERPL-SCNC: 4.4 MMOL/L — SIGNIFICANT CHANGE UP (ref 3.5–5)
PROT SERPL-MCNC: 6.2 G/DL — SIGNIFICANT CHANGE UP (ref 6–8)
RBC # BLD: 4.21 M/UL — SIGNIFICANT CHANGE UP (ref 4.2–5.4)
RBC # BLD: 4.51 M/UL — SIGNIFICANT CHANGE UP (ref 4.2–5.4)
RBC # FLD: 13 % — SIGNIFICANT CHANGE UP (ref 11.5–14.5)
RBC # FLD: 13.1 % — SIGNIFICANT CHANGE UP (ref 11.5–14.5)
SODIUM SERPL-SCNC: 139 MMOL/L — SIGNIFICANT CHANGE UP (ref 135–146)
WBC # BLD: 8.61 K/UL — SIGNIFICANT CHANGE UP (ref 4.8–10.8)
WBC # BLD: 8.98 K/UL — SIGNIFICANT CHANGE UP (ref 4.8–10.8)
WBC # FLD AUTO: 8.61 K/UL — SIGNIFICANT CHANGE UP (ref 4.8–10.8)
WBC # FLD AUTO: 8.98 K/UL — SIGNIFICANT CHANGE UP (ref 4.8–10.8)

## 2025-01-03 PROCEDURE — 93280 PM DEVICE PROGR EVAL DUAL: CPT | Mod: 26

## 2025-01-03 PROCEDURE — 99233 SBSQ HOSP IP/OBS HIGH 50: CPT

## 2025-01-03 PROCEDURE — 88311 DECALCIFY TISSUE: CPT | Mod: 26

## 2025-01-03 PROCEDURE — 88305 TISSUE EXAM BY PATHOLOGIST: CPT | Mod: 26

## 2025-01-03 PROCEDURE — 27236 TREAT THIGH FRACTURE: CPT | Mod: RT

## 2025-01-03 PROCEDURE — 72170 X-RAY EXAM OF PELVIS: CPT | Mod: 26

## 2025-01-03 PROCEDURE — 99223 1ST HOSP IP/OBS HIGH 75: CPT

## 2025-01-03 RX ORDER — GLUCAGON INJECTION, SOLUTION 0.5 MG/.1ML
1 INJECTION, SOLUTION SUBCUTANEOUS ONCE
Refills: 0 | Status: DISCONTINUED | OUTPATIENT
Start: 2025-01-03 | End: 2025-01-07

## 2025-01-03 RX ORDER — POLYETHYLENE GLYCOL 3350 17 G/DOSE
17 POWDER (GRAM) ORAL
Refills: 0 | Status: DISCONTINUED | OUTPATIENT
Start: 2025-01-03 | End: 2025-01-07

## 2025-01-03 RX ORDER — DEXTROSE MONOHYDRATE 25 G/50ML
15 INJECTION, SOLUTION INTRAVENOUS ONCE
Refills: 0 | Status: DISCONTINUED | OUTPATIENT
Start: 2025-01-03 | End: 2025-01-07

## 2025-01-03 RX ORDER — DEXTROSE MONOHYDRATE 25 G/50ML
25 INJECTION, SOLUTION INTRAVENOUS ONCE
Refills: 0 | Status: DISCONTINUED | OUTPATIENT
Start: 2025-01-03 | End: 2025-01-07

## 2025-01-03 RX ORDER — CALCIUM ACETATE 667 MG/1
667 CAPSULE ORAL
Refills: 0 | Status: DISCONTINUED | OUTPATIENT
Start: 2025-01-03 | End: 2025-01-05

## 2025-01-03 RX ORDER — SODIUM CHLORIDE 9 MG/ML
1000 INJECTION, SOLUTION INTRAVENOUS
Refills: 0 | Status: DISCONTINUED | OUTPATIENT
Start: 2025-01-03 | End: 2025-01-07

## 2025-01-03 RX ORDER — OXYCODONE HCL 15 MG
5 TABLET ORAL EVERY 6 HOURS
Refills: 0 | Status: DISCONTINUED | OUTPATIENT
Start: 2025-01-03 | End: 2025-01-07

## 2025-01-03 RX ORDER — DEXTROSE MONOHYDRATE 25 G/50ML
12.5 INJECTION, SOLUTION INTRAVENOUS ONCE
Refills: 0 | Status: DISCONTINUED | OUTPATIENT
Start: 2025-01-03 | End: 2025-01-07

## 2025-01-03 RX ORDER — SODIUM CHLORIDE 9 MG/ML
1000 INJECTION, SOLUTION INTRAVENOUS
Refills: 0 | Status: DISCONTINUED | OUTPATIENT
Start: 2025-01-03 | End: 2025-01-04

## 2025-01-03 RX ORDER — CLINDAMYCIN HYDROCHLORIDE 300 MG/1
600 CAPSULE ORAL EVERY 8 HOURS
Refills: 0 | Status: COMPLETED | OUTPATIENT
Start: 2025-01-03 | End: 2025-01-04

## 2025-01-03 RX ORDER — ERGOCALCIFEROL 1.25 MG/1
50000 CAPSULE ORAL
Refills: 0 | Status: DISCONTINUED | OUTPATIENT
Start: 2025-01-03 | End: 2025-01-07

## 2025-01-03 RX ORDER — INSULIN LISPRO 100/ML
VIAL (ML) SUBCUTANEOUS
Refills: 0 | Status: DISCONTINUED | OUTPATIENT
Start: 2025-01-03 | End: 2025-01-07

## 2025-01-03 RX ORDER — ACETAMINOPHEN 80 MG/.8ML
650 SOLUTION/ DROPS ORAL EVERY 6 HOURS
Refills: 0 | Status: DISCONTINUED | OUTPATIENT
Start: 2025-01-03 | End: 2025-01-07

## 2025-01-03 RX ORDER — HYDROMORPHONE HCL 4 MG
0.5 TABLET ORAL
Refills: 0 | Status: DISCONTINUED | OUTPATIENT
Start: 2025-01-03 | End: 2025-01-04

## 2025-01-03 RX ORDER — MORPHINE SULFATE 15 MG
2 TABLET, EXTENDED RELEASE ORAL EVERY 6 HOURS
Refills: 0 | Status: DISCONTINUED | OUTPATIENT
Start: 2025-01-03 | End: 2025-01-07

## 2025-01-03 RX ORDER — LIDOCAINE 50 MG/G
1 OINTMENT TOPICAL DAILY
Refills: 0 | Status: DISCONTINUED | OUTPATIENT
Start: 2025-01-03 | End: 2025-01-07

## 2025-01-03 RX ORDER — ERGOCALCIFEROL 1.25 MG/1
50000 CAPSULE ORAL
Refills: 0 | Status: DISCONTINUED | OUTPATIENT
Start: 2025-01-03 | End: 2025-01-03

## 2025-01-03 RX ADMIN — SODIUM CHLORIDE 50 MILLILITER(S): 9 INJECTION, SOLUTION INTRAVENOUS at 08:39

## 2025-01-03 RX ADMIN — ACETAMINOPHEN 650 MILLIGRAM(S): 80 SOLUTION/ DROPS ORAL at 00:06

## 2025-01-03 RX ADMIN — ACETAMINOPHEN 650 MILLIGRAM(S): 80 SOLUTION/ DROPS ORAL at 05:25

## 2025-01-03 RX ADMIN — CALCIUM ACETATE 667 MILLIGRAM(S): 667 CAPSULE ORAL at 08:38

## 2025-01-03 RX ADMIN — POTASSIUM CHLORIDE 40 MILLIEQUIVALENT(S): 600 TABLET, FILM COATED, EXTENDED RELEASE ORAL at 02:16

## 2025-01-03 RX ADMIN — SODIUM CHLORIDE 50 MILLILITER(S): 9 INJECTION, SOLUTION INTRAVENOUS at 01:10

## 2025-01-03 RX ADMIN — SODIUM CHLORIDE 75 MILLILITER(S): 9 INJECTION, SOLUTION INTRAVENOUS at 23:37

## 2025-01-03 RX ADMIN — POTASSIUM CHLORIDE 40 MILLIEQUIVALENT(S): 600 TABLET, FILM COATED, EXTENDED RELEASE ORAL at 00:06

## 2025-01-03 RX ADMIN — Medication 17 GRAM(S): at 05:25

## 2025-01-03 RX ADMIN — LIDOCAINE 1 PATCH: 50 OINTMENT TOPICAL at 11:42

## 2025-01-03 NOTE — CONSULT NOTE ADULT - SUBJECTIVE AND OBJECTIVE BOX
Patient is a 64y old  Female who presents with falls    HPI: 64 year old female with the past medical history of diabetes mellitus, bipolar disorder, atrial fibrillation on Eliquis presented to the ED after an unwitnessed ground-level fall; after tripping. Patient is coming by her , states that she came to pick her up for a PMD visit and found her outside of her apartment laying on the ground for an unknown period of time.    Patient states that she tripped and fell landing on her right hip which is causing her significant pain.    She denies LOC, denies head strike.      Of note, patient had a fall 1 week ago, also ground-level fall but eloped prior to receiving any medical treatment.  Back then she reports that she had lost control of both of her legs which caused her fall.           PAST MEDICAL & SURGICAL HISTORY:  Diabetes mellitus  Bipolar disorder in partial remission, most recent episode unspecified type  Last episodes six years ago  History of hysterectomy for malignancy      PREVIOUS DIAGNOSTIC TESTING:      CT HEAD:  No acute intracranial findings.    Stable mild-moderate chronic microvascular ischemic changes.    CT CERVICAL SPINE:  No acute cervical fracture or facet subluxation.      CT abdomen and pelvis w IV contrast:  Acute, displaced right femoral neck fracture.  Acute, nondisplaced right anterior 6-7th rib fracture; no pneumothorax.    Xray tibia fibula: TIBIA/FIBULA: No acute displaced fracture. Osteopenia.    TTE 2023:   1. Left ventricular ejection fraction, by visual estimation, is 65 to 70%.   2. Normal global left ventricular systolic function.   3. The left ventricular diastolic function could not be assessed in this study.   4. Mildly enlarged left atrium.   5. Mild-to-moderate mitral regurgitation.   6. Mild tricuspid regurgitation.PULMONARY ANGIO with IV Contrast:  FINDINGS:    MEDICATIONS  (STANDING):  acetaminophen     Tablet .. 650 milliGRAM(s) Oral every 6 hours  calcium acetate 667 milliGRAM(s) Oral three times a day with meals  dextrose 5% + lactated ringers. 1000 milliLiter(s) (50 mL/Hr) IV Continuous <Continuous>  dextrose 5%. 1000 milliLiter(s) (50 mL/Hr) IV Continuous <Continuous>  dextrose 5%. 1000 milliLiter(s) (100 mL/Hr) IV Continuous <Continuous>  dextrose 50% Injectable 25 Gram(s) IV Push once  dextrose 50% Injectable 12.5 Gram(s) IV Push once  dextrose 50% Injectable 25 Gram(s) IV Push once  glucagon  Injectable 1 milliGRAM(s) IntraMuscular once  influenza   Vaccine 0.5 milliLiter(s) IntraMuscular once  insulin lispro (ADMELOG) corrective regimen sliding scale   SubCutaneous three times a day before meals  lidocaine   4% Patch 1 Patch Transdermal daily  polyethylene glycol 3350 17 Gram(s) Oral two times a day    MEDICATIONS  (PRN):  dextrose Oral Gel 15 Gram(s) Oral once PRN Blood Glucose LESS THAN 70 milliGRAM(s)/deciliter  morphine  - Injectable 2 milliGRAM(s) IV Push every 6 hours PRN Severe Pain (7 - 10)  oxyCODONE    IR 5 milliGRAM(s) Oral every 6 hours PRN Moderate Pain (4 - 6)      FAMILY HISTORY:      SOCIAL HISTORY:    CIGARETTES:    ALCOHOL:    Past Surgical History:    Allergies:    penicillin (Other)      REVIEW OF SYSTEMS:    CONSTITUTIONAL: No fever, weight loss, chills, shakes, or fatigue  EYES: No eye pain, visual disturbances, or discharge  ENMT:  No difficulty hearing, tinnitus, vertigo; No sinus or throat pain  NECK: No pain or stiffness  BREASTS: No pain, masses, or nipple discharge  RESPIRATORY: No cough, wheezing, hemoptysis, or shortness of breath  CARDIOVASCULAR: No chest pain, dyspnea, palpitations, dizziness, syncope, paroxysmal nocturnal dyspnea, orthopnea, or arm or leg swelling  GASTROINTESTINAL: No abdominal  or epigastric pain, nausea, vomiting, hematemesis, diarrhea, constipation, melena or bright red blood.  GENITOURINARY: No dysuria, nocturia, hematuria, or urinary incontinence  NEUROLOGICAL: No headaches, memory loss, slurred speech, limb weakness, loss of strength, numbness, or tremors  SKIN: No itching, burning, rashes, or lesions   LYMPH NODES: No enlarged glands  ENDOCRINE: No heat or cold intolerance, or hair loss  MUSCULOSKELETAL: No joint pain or swelling, muscle, back, or extremity pain  PSYCHIATRIC: No depression, anxiety, or difficulty sleeping  HEME/LYMPH: No easy bruising or bleeding gums  ALLERY AND IMMUNOLOGIC: No hives or rash.      Vital Signs Last 24 Hrs  T(C): 36.7 (03 Jan 2025 05:09), Max: 37.8 (02 Jan 2025 21:15)  T(F): 98.1 (03 Jan 2025 05:09), Max: 100.1 (02 Jan 2025 21:15)  HR: 80 (03 Jan 2025 05:09) (80 - 104)  BP: 148/64 (03 Jan 2025 05:09) (119/77 - 157/71)  BP(mean): --  RR: 18 (03 Jan 2025 05:09) (18 - 18)  SpO2: 94% (03 Jan 2025 05:09) (92% - 94%)    Parameters below as of 03 Jan 2025 05:09  Patient On (Oxygen Delivery Method): room air        PHYSICAL EXAM:        GENERAL: In no apparent distress, well nourished, and hydrated.  HEAD:  Atraumatic, Normocephalic  EYES: EOMI, PERRLA, conjunctiva and sclera clear  ENMT: No tonsillar erythema, exudates, or enlargements; ist mucous membranes, Good dentition, No lesions  NECK: Supple and normal thyroid.  No JVD or carotid bruit.  Carotid pulse is 2+ bilaterally.  HEART: Regular rate and rhythm; No murmurs, rubs, or gallops.  PULMONARY: Clear to auscultation and perfusion.  No rales, wheezing, or rhonchi bilaterally.  ABDOMEN: Soft, Nontender, Nondistended; Bowel sounds present  EXTREMITIES:  2+ Peripheral Pulses, No clubbing, cyanosis, or edema  LYMPH: No lymphadenopathy noted  NEUROLOGICAL: Grossly nonfocal      INTERPRETATION OF TELEMETRY:    ECG:    I&O's Detail      LABS:                        14.6   8.61  )-----------( 141      ( 03 Jan 2025 07:50 )             44.1     01-03    139  |  104  |  11  ----------------------------<  169[H]  4.4   |  24  |  0.6[L]    Ca    8.5      03 Jan 2025 07:50  Phos  2.4     01-03  Mg     2.1     01-03    TPro  6.2  /  Alb  4.0  /  TBili  0.4  /  DBili  x   /  AST  23  /  ALT  12  /  AlkPhos  61  01-03        PT/INR - ( 02 Jan 2025 14:11 )   PT: 13.40 sec;   INR: 1.13 ratio         PTT - ( 02 Jan 2025 14:11 )  PTT:31.0 sec  Urinalysis Basic - ( 03 Jan 2025 07:50 )    Color: x / Appearance: x / SG: x / pH: x  Gluc: 169 mg/dL / Ketone: x  / Bili: x / Urobili: x   Blood: x / Protein: x / Nitrite: x   Leuk Esterase: x / RBC: x / WBC x   Sq Epi: x / Non Sq Epi: x / Bacteria: x      BNP  I&O's Detail    Daily Height in cm: 167.64 (02 Jan 2025 22:39)    Daily     RADIOLOGY & ADDITIONAL STUDIES: Patient is a 64y old  Female who presents with falls    HPI: 64 year old female with the past medical history of diabetes mellitus, bipolar disorder, atrial fibrillation on Eliquis presented to the ED after an unwitnessed ground-level fall; after tripping. Per H&P her , states that she came to pick her up for a PMD visit and found her outside of her apartment laying on the ground for an unknown period of time.  Patient states that she tripped and fell landing on her right hip which is causing her significant pain. She denies LOC, denies head strike. She had a fall 1 week ago, also ground-level fall but eloped prior to receiving any medical treatment.  Back then she reports that she had lost control of both of her legs which caused her fall. Orthopaedics evaluated the patient who is tentatively plan for right hip hemiarthroplasty. Electrophysiology was consulted for a permanent pacemaker interrogation. The patient has known atrial fibrillation however considering multiple falls the patient may benefit from a left atrial closure device implantation.       PAST MEDICAL & SURGICAL HISTORY:  Diabetes mellitus  Bipolar disorder in partial remission, most recent episode unspecified type  Last episodes six years ago  History of hysterectomy for malignancy    PREVIOUS DIAGNOSTIC TESTING:      CT HEAD:  No acute intracranial findings.    Stable mild-moderate chronic microvascular ischemic changes.    CT CERVICAL SPINE:  No acute cervical fracture or facet subluxation.      CT abdomen and pelvis w IV contrast:  Acute, displaced right femoral neck fracture.  Acute, nondisplaced right anterior 6-7th rib fracture; no pneumothorax.    Xray tibia fibula: TIBIA/FIBULA: No acute displaced fracture. Osteopenia.    TTE 2023:   1. Left ventricular ejection fraction, by visual estimation, is 65 to 70%.   2. Normal global left ventricular systolic function.   3. The left ventricular diastolic function could not be assessed in this study.   4. Mildly enlarged left atrium.   5. Mild-to-moderate mitral regurgitation.   6. Mild tricuspid regurgitation.PULMONARY ANGIO with IV Contrast:  FINDINGS:    MEDICATIONS  (STANDING):  acetaminophen     Tablet .. 650 milliGRAM(s) Oral every 6 hours  calcium acetate 667 milliGRAM(s) Oral three times a day with meals  dextrose 5% + lactated ringers. 1000 milliLiter(s) (50 mL/Hr) IV Continuous <Continuous>  dextrose 5%. 1000 milliLiter(s) (50 mL/Hr) IV Continuous <Continuous>  dextrose 5%. 1000 milliLiter(s) (100 mL/Hr) IV Continuous <Continuous>  dextrose 50% Injectable 25 Gram(s) IV Push once  dextrose 50% Injectable 12.5 Gram(s) IV Push once  dextrose 50% Injectable 25 Gram(s) IV Push once  glucagon  Injectable 1 milliGRAM(s) IntraMuscular once  influenza   Vaccine 0.5 milliLiter(s) IntraMuscular once  insulin lispro (ADMELOG) corrective regimen sliding scale   SubCutaneous three times a day before meals  lidocaine   4% Patch 1 Patch Transdermal daily  polyethylene glycol 3350 17 Gram(s) Oral two times a day    MEDICATIONS  (PRN):  dextrose Oral Gel 15 Gram(s) Oral once PRN Blood Glucose LESS THAN 70 milliGRAM(s)/deciliter  morphine  - Injectable 2 milliGRAM(s) IV Push every 6 hours PRN Severe Pain (7 - 10)  oxyCODONE    IR 5 milliGRAM(s) Oral every 6 hours PRN Moderate Pain (4 - 6)    FAMILY HISTORY: as per H&P    SOCIAL HISTORY: as per H&P    CIGARETTES: as per H&P    ALCOHOL: as per H&P    Past Surgical History: as above    Allergies: penicillin (Other)      REVIEW OF SYSTEMS:  CONSTITUTIONAL: No fever, weight loss, chills, shakes, or fatigue  EYES: No eye pain, visual disturbances, or discharge  ENMT:  No difficulty hearing, tinnitus, vertigo; No sinus or throat pain  NECK: No pain or stiffness  BREASTS: No pain, masses, or nipple discharge  RESPIRATORY: No cough, wheezing, hemoptysis, or shortness of breath  CARDIOVASCULAR: No chest pain, dyspnea, palpitations, dizziness, syncope, paroxysmal nocturnal dyspnea, orthopnea, or arm or leg swelling  GASTROINTESTINAL: No abdominal  or epigastric pain, nausea, vomiting, hematemesis, diarrhea, constipation, melena or bright red blood.  GENITOURINARY: No dysuria, nocturia, hematuria, or urinary incontinence  NEUROLOGICAL: No headaches, memory loss, slurred speech, limb weakness, loss of strength, numbness, or tremors  SKIN: No itching, burning, rashes, or lesions   LYMPH NODES: No enlarged glands  ENDOCRINE: No heat or cold intolerance, or hair loss  MUSCULOSKELETAL: (+) extremity pain  PSYCHIATRIC: No depression, anxiety, or difficulty sleeping  HEME/LYMPH: No easy bruising or bleeding gums  ALLERY AND IMMUNOLOGIC: No hives or rash.    Vital Signs Last 24 Hrs  T(C): 36.7 (03 Jan 2025 05:09), Max: 37.8 (02 Jan 2025 21:15)  T(F): 98.1 (03 Jan 2025 05:09), Max: 100.1 (02 Jan 2025 21:15)  HR: 80 (03 Jan 2025 05:09) (80 - 104)  BP: 148/64 (03 Jan 2025 05:09) (119/77 - 157/71)  BP(mean): --  RR: 18 (03 Jan 2025 05:09) (18 - 18)  SpO2: 94% (03 Jan 2025 05:09) (92% - 94%)    Parameters below as of 03 Jan 2025 05:09  Patient On (Oxygen Delivery Method): room air    PHYSICAL EXAM:  GENERAL: In no apparent distress, well nourished, and hydrated.  HEAD:  Atraumatic, Normocephalic  EYES: EOMI, PERRLA, conjunctiva and sclera clear  ENMT: No tonsillar erythema, exudates, or enlargements; ist mucous membranes, Good dentition, No lesions  NECK: Supple and normal thyroid.  No JVD or carotid bruit.  Carotid pulse is 2+ bilaterally.  HEART: Regular rate and rhythm; No murmurs, rubs, or gallops.  PULMONARY: Clear to auscultation and perfusion.  No rales, wheezing, or rhonchi bilaterally.  ABDOMEN: Soft, Nontender, Nondistended; Bowel sounds present  EXTREMITIES:  2+ Peripheral Pulses, No clubbing, cyanosis, or edema  LYMPH: No lymphadenopathy noted  NEUROLOGICAL: Grossly nonfocal    INTERPRETATION OF TELEMETRY: sinus rhythm     ECG:    Ventricular Rate 84 BPM    Atrial Rate 84 BPM    P-R Interval 142 ms    QRS Duration 146 ms    Q-T Interval 414 ms    QTC Calculation(Bazett) 489 ms    P Axis 65 degrees    R Axis -55 degrees    T Axis 131 degrees    Diagnosis Line Atrial-sensed ventricular-paced rhythm  Abnormal ECG    Confirmed by Phill Mcdonald ([id number]) on 1/2/2025 6:35:44 PM      I&O's Detail      LABS:                        14.6   8.61  )-----------( 141      ( 03 Jan 2025 07:50 )             44.1     01-03    139  |  104  |  11  ----------------------------<  169[H]  4.4   |  24  |  0.6[L]    Ca    8.5      03 Jan 2025 07:50  Phos  2.4     01-03  Mg     2.1     01-03    TPro  6.2  /  Alb  4.0  /  TBili  0.4  /  DBili  x   /  AST  23  /  ALT  12  /  AlkPhos  61  01-03        PT/INR - ( 02 Jan 2025 14:11 )   PT: 13.40 sec;   INR: 1.13 ratio         PTT - ( 02 Jan 2025 14:11 )  PTT:31.0 sec  Urinalysis Basic - ( 03 Jan 2025 07:50 )    Color: x / Appearance: x / SG: x / pH: x  Gluc: 169 mg/dL / Ketone: x  / Bili: x / Urobili: x   Blood: x / Protein: x / Nitrite: x   Leuk Esterase: x / RBC: x / WBC x   Sq Epi: x / Non Sq Epi: x / Bacteria: x      BNP  I&O's Detail    Daily Height in cm: 167.64 (02 Jan 2025 22:39)    Daily     RADIOLOGY & ADDITIONAL STUDIES:

## 2025-01-03 NOTE — PROGRESS NOTE ADULT - SUBJECTIVE AND OBJECTIVE BOX
Patient is a 64y old  Female who presents with a chief complaint of   INTERVAL HPI/OVERNIGHT EVENTS: Patient was examined and seen at bedside. This morning pt is resting comfortably in bed and reports no new issues or overnight events. No complaints, feels ok. Pain is controlled.  ROS: Denies CP, SOB, AP, new weakness  All other systems reviewed and are within normal limits.  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   Patient is coming by her , states that she came to pick her up for a PMD visit and found her outside of her apartment laying on the ground for an unknown period of time.    Patient states that she tripped and fell landing on her right hip which is causing her significant pain.    She denies LOC, denies head strike.      Of note, patient had a fall 1 week ago, also ground-level fall but eloped prior to receiving any medical treatment.  Back then she reports that she had lost control of both of her legs which caused her fall.      Today, she denies shortness of breath, chest pain, abdominal pain, nausea, vomiting.    Denies recent fever, chills    Patient is not presenting from [facility]; patient denied it and I called the [facility] and they confirmed that she is not currently admitted there.     In the ED   Vitals   · BP Systolic	119 mm Hg  · BP Diastolic	77 mm Hg  · Heart Rate	 104 /min  · Respiration Rate (breaths/min)	18 /min  · Temp (C)	37 Degrees C  · SpO2 (%)	94 % room air    CT HEAD:  No acute intracranial findings.    Stable mild-moderate chronic microvascular ischemic changes.    CT CERVICAL SPINE:  No acute cervical fracture or facet subluxation.      CT abdomen and pelvis w IV contrast:  Acute, displaced right femoral neck fracture.  Acute, nondisplaced right anterior 6-7th rib fracture; no pneumothorax.    Xray tibia fibula: TIBIA/FIBULA: No acute displaced fracture. Osteopenia.    TTE 2023:   1. Left ventricular ejection fraction, by visual estimation, is 65 to 70%.   2. Normal global left ventricular systolic function.   3. The left ventricular diastolic function could not be assessed in this study.   4. Mildly enlarged left atrium.   5. Mild-to-moderate mitral regurgitation.   6. Mild tricuspid regurgitation.   (02 Jan 2025 20:34)    PAST MEDICAL & SURGICAL HISTORY:  Diabetes mellitus      Bipolar disorder in partial remission, most recent episode unspecified type  Last episodes six years ago      History of hysterectomy for malignancy          General: NAD, AAO3  HEENT:  EOMI, no LAD  CV: S1 S2  Resp: decreased breath sounds at bases  GI: NT/ND/S +BS  MS: no clubbing/cyanosis/edema, + pulses b/l  Neuro: nonfocal, +reflexes thruout    MEDICATIONS  (STANDING):  acetaminophen     Tablet .. 650 milliGRAM(s) Oral every 6 hours  calcium acetate 667 milliGRAM(s) Oral three times a day with meals  dextrose 5% + lactated ringers. 1000 milliLiter(s) (50 mL/Hr) IV Continuous <Continuous>  dextrose 5%. 1000 milliLiter(s) (50 mL/Hr) IV Continuous <Continuous>  dextrose 5%. 1000 milliLiter(s) (100 mL/Hr) IV Continuous <Continuous>  dextrose 50% Injectable 25 Gram(s) IV Push once  dextrose 50% Injectable 12.5 Gram(s) IV Push once  dextrose 50% Injectable 25 Gram(s) IV Push once  glucagon  Injectable 1 milliGRAM(s) IntraMuscular once  influenza   Vaccine 0.5 milliLiter(s) IntraMuscular once  insulin lispro (ADMELOG) corrective regimen sliding scale   SubCutaneous three times a day before meals  lidocaine   4% Patch 1 Patch Transdermal daily  polyethylene glycol 3350 17 Gram(s) Oral two times a day    MEDICATIONS  (PRN):  dextrose Oral Gel 15 Gram(s) Oral once PRN Blood Glucose LESS THAN 70 milliGRAM(s)/deciliter  morphine  - Injectable 2 milliGRAM(s) IV Push every 6 hours PRN Severe Pain (7 - 10)  oxyCODONE    IR 5 milliGRAM(s) Oral every 6 hours PRN Moderate Pain (4 - 6)    Vital Signs Last 24 Hrs  T(C): 36.5 (03 Jan 2025 13:08), Max: 37.8 (02 Jan 2025 21:15)  T(F): 97.7 (03 Jan 2025 13:08), Max: 100.1 (02 Jan 2025 21:15)  HR: 88 (03 Jan 2025 13:08) (80 - 99)  BP: 182/80 (03 Jan 2025 13:08) (145/64 - 182/80)  BP(mean): 114 (03 Jan 2025 13:08) (114 - 114)  RR: 18 (03 Jan 2025 05:09) (18 - 18)  SpO2: 95% (03 Jan 2025 13:08) (92% - 95%)    Parameters below as of 03 Jan 2025 13:08  Patient On (Oxygen Delivery Method): room air      CAPILLARY BLOOD GLUCOSE      POCT Blood Glucose.: 146 mg/dL (03 Jan 2025 11:14)  POCT Blood Glucose.: 158 mg/dL (03 Jan 2025 07:52)                          14.6   8.61  )-----------( 141      ( 03 Jan 2025 07:50 )             44.1     01-03    139  |  104  |  11  ----------------------------<  169[H]  4.4   |  24  |  0.6[L]    Ca    8.5      03 Jan 2025 07:50  Phos  2.4     01-03  Mg     2.1     01-03    TPro  6.2  /  Alb  4.0  /  TBili  0.4  /  DBili  x   /  AST  23  /  ALT  12  /  AlkPhos  61  01-03    LIVER FUNCTIONS - ( 03 Jan 2025 07:50 )  Alb: 4.0 g/dL / Pro: 6.2 g/dL / ALK PHOS: 61 U/L / ALT: 12 U/L / AST: 23 U/L / GGT: x               PT/INR - ( 02 Jan 2025 14:11 )   PT: 13.40 sec;   INR: 1.13 ratio         PTT - ( 02 Jan 2025 14:11 )  PTT:31.0 sec  Urinalysis Basic - ( 03 Jan 2025 07:50 )    Color: x / Appearance: x / SG: x / pH: x  Gluc: 169 mg/dL / Ketone: x  / Bili: x / Urobili: x   Blood: x / Protein: x / Nitrite: x   Leuk Esterase: x / RBC: x / WBC x   Sq Epi: x / Non Sq Epi: x / Bacteria: x              Chart, Consultant(s) Notes Reviewed:  [x ] YES  [ ] NO  Care Discussed with Consultants/Other Providers/ Housestaff [ x] YES  [ ] NO  Radiology, labs, old available records personally reviewed.

## 2025-01-03 NOTE — BRIEF OPERATIVE NOTE - OPERATION/FINDINGS
above fracture; post op WBAT with anterolateral hip precautions, Abx and DVT ppx per protocol  Dict:  Implants: Actis size 6 standard femoral stem; +5/28/56 Bipolar head

## 2025-01-03 NOTE — PROGRESS NOTE ADULT - SUBJECTIVE AND OBJECTIVE BOX
ORTHOPEDIC POST-OP CHECK    Subjective: POD0 s/p R hip hemiarthroplasty. Seen and examined at bedside. Doing well, pain controlled.     MEDICATIONS  (STANDING):  clindamycin IVPB 600 milliGRAM(s) IV Intermittent every 8 hours  influenza   Vaccine 0.5 milliLiter(s) IntraMuscular once  lactated ringers. 1000 milliLiter(s) (75 mL/Hr) IV Continuous <Continuous>    MEDICATIONS  (PRN):  HYDROmorphone  Injectable 0.5 milliGRAM(s) IV Push every 10 minutes PRN Moderate Pain (4 - 6)      Objective:  T(C): 37.2 (01-03-25 @ 21:45), Max: 37.7 (01-03-25 @ 20:45)  HR: 74 (01-03-25 @ 21:45) (67 - 99)  BP: 150/65 (01-03-25 @ 21:45) (142/63 - 192/81)  RR: 15 (01-03-25 @ 21:30) (14 - 18)  SpO2: 94% (01-03-25 @ 21:45) (92% - 98%)    Physical Exam:    RLE:  Dressing c/d/i  SILT s/s/sp/dp/t  Motor intact TA/EHL/FHL/GS  Digits wwp    Labs:                        14.6   8.61  )-----------( 141      ( 03 Jan 2025 07:50 )             44.1     01-03    139  |  104  |  11  ----------------------------<  169[H]  4.4   |  24  |  0.6[L]    Ca    8.5      03 Jan 2025 07:50  Phos  2.4     01-03  Mg     2.1     01-03    TPro  6.2  /  Alb  4.0  /  TBili  0.4  /  DBili  x   /  AST  23  /  ALT  12  /  AlkPhos  61  01-03      A/P: 64yFemale POD0 s/p R hip hemiarthroplasty on 1/3, doing well.    - Activity: WBAT RLE  - Abx: Clindamycin q8hr x3 doses for a total of 24hrs post-operatively  - DVT PPx: Resume chemoprophylaxis in AM.   - Pain control  - IS encouraged  - AM labs  - PT/Rehab  - D/C planning    - If discharged, patient should follow up with Dr. Meng at [address] [facility] phone [phone].  - Patient should be discharged on appropriate DVT prophylaxis for 6 weeks (from surgery date). Orthopaedic preference is Aspirin 81mg twice daily for DVT prophylaxis unless contraindication or all ready on home anticoagulation for which they may continue instead as their mobility/function/ambulation will be decreased due to lower extremity orthopaedic surgery. ORTHOPEDIC POST-OP CHECK    Subjective: POD0 s/p R hip hemiarthroplasty. Seen and examined at bedside. Doing well, pain controlled.     MEDICATIONS  (STANDING):  clindamycin IVPB 600 milliGRAM(s) IV Intermittent every 8 hours  influenza   Vaccine 0.5 milliLiter(s) IntraMuscular once  lactated ringers. 1000 milliLiter(s) (75 mL/Hr) IV Continuous <Continuous>    MEDICATIONS  (PRN):  HYDROmorphone  Injectable 0.5 milliGRAM(s) IV Push every 10 minutes PRN Moderate Pain (4 - 6)      Objective:  T(C): 37.2 (01-03-25 @ 21:45), Max: 37.7 (01-03-25 @ 20:45)  HR: 74 (01-03-25 @ 21:45) (67 - 99)  BP: 150/65 (01-03-25 @ 21:45) (142/63 - 192/81)  RR: 15 (01-03-25 @ 21:30) (14 - 18)  SpO2: 94% (01-03-25 @ 21:45) (92% - 98%)    Physical Exam:    RLE:  Dressing c/d/i  SILT s/s/sp/dp/t  Motor intact TA/EHL/FHL/GS  Digits wwp    Labs:                        14.6   8.61  )-----------( 141      ( 03 Jan 2025 07:50 )             44.1     01-03    139  |  104  |  11  ----------------------------<  169[H]  4.4   |  24  |  0.6[L]    Ca    8.5      03 Jan 2025 07:50  Phos  2.4     01-03  Mg     2.1     01-03    TPro  6.2  /  Alb  4.0  /  TBili  0.4  /  DBili  x   /  AST  23  /  ALT  12  /  AlkPhos  61  01-03      A/P: 64yFemale POD0 s/p R hip hemiarthroplasty on 1/3, doing well.    Recommend obtaining urinalysis post op. Patient was febrile at time of induction of anesthesia     - Activity: WBAT RLE  - Abx: Clindamycin q8hr x3 doses for a total of 24hrs post-operatively  - DVT PPx: Resume chemoprophylaxis in AM.   - Pain control  - IS encouraged  - AM labs  - PT/Rehab  - D/C planning    - If discharged, patient should follow up with Dr. Meng at [address] [facility] phone [phone].  - Patient should be discharged on appropriate DVT prophylaxis for 6 weeks (from surgery date). Orthopaedic preference is Aspirin 81mg twice daily for DVT prophylaxis unless contraindication or all ready on home anticoagulation for which they may continue instead as their mobility/function/ambulation will be decreased due to lower extremity orthopaedic surgery.

## 2025-01-03 NOTE — CHART NOTE - NSCHARTNOTEFT_GEN_A_CORE
ANESTHESIA to PACU NOTE      ____ Intubated  TV:______       Rate: ______      FiO2: ______    __x__ Patent Airway    __x__ Full return of protective reflexes    ____ Full recovery from anesthesia / sedation to baseline status    Vitals:  HR 85  /81  RR 15  O2sat. 97%  Temp: 37.7C      Mental Status:  __x__ Awake   __x___ Alert   _____ Drowsy   _____ Sedated    Nausea/Vomiting: ____ Yes, See Post - Op Orders      __x__ No    Pain Scale (0-10): _____    Treatment: _x___ None    ____ See Post - Op/PCA Orders    Post - Operative Fluids:   ____ Oral   __x__ See Post - Op Orders    Plan:  Discharge to:   ____Home       _x____Floor      _____Critical Care    _____ Other:_________________    Comments: s/p general anesthesia with ETT. Pt was extubated post procedure to n/c at 2L/min. No anesthesia complications. Pt's condition is stable in PACU. Full report is given to PACU RN.
Device: Assurity MRI 2272    Indication: complete heart block   Interrogation complete. Device functioning normally.     Mode: DDD/60/125/180/150  Dependent: Yes  AT/AF burden: <1%  MRI compatible: Yes  Battery: 7.8-8.2 years  Underlying Rhythm: complete heart block     Events:   - 12/31/24 5:18PM short episode of atrial fibrillation for 2 minutes 6 seconds    Recommendations:   - Routine follow up as outpatient with Mark Talbot MD   - Continue anticoagulation if no contraindications exist  - Out patient discussion regarding a left atrial closure device due to frequent falls    EPS [id number]
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   Patient is coming by her , states that she came to pick her up for a PMD visit and found her outside of her apartment laying on the ground for an unknown period of time.    Patient states that she tripped and fell landing on her right hip which is causing her significant pain.    She denies LOC, denies head strike.      Of note, patient had a fall 1 week ago, also ground-level fall but eloped prior to receiving any medical treatment.  Back then she reports that she had lost control of both of her legs which caused her fall.      Today, she denies shortness of breath, chest pain, abdominal pain, nausea, vomiting.    Denies recent fever, chills    Patient is not presenting from [facility]; patient denied it and I called the [facility] and they confirmed that she is not currently admitted there.     In the ED   Vitals   · BP Systolic	119 mm Hg  · BP Diastolic	77 mm Hg  · Heart Rate	 104 /min  · Respiration Rate (breaths/min)	18 /min  · Temp (C)	37 Degrees C  · SpO2 (%)	94 % room air    CT HEAD:  No acute intracranial findings.    Stable mild-moderate chronic microvascular ischemic changes.    CT CERVICAL SPINE:  No acute cervical fracture or facet subluxation.      CT abdomen and pelvis w IV contrast:  Acute, displaced right femoral neck fracture.  Acute, nondisplaced right anterior 6-7th rib fracture; no pneumothorax.    Xray tibia fibula: TIBIA/FIBULA: No acute displaced fracture. Osteopenia.    TTE 2023:   1. Left ventricular ejection fraction, by visual estimation, is 65 to 70%.   2. Normal global left ventricular systolic function.   3. The left ventricular diastolic function could not be assessed in this study.   4. Mildly enlarged left atrium.   5. Mild-to-moderate mitral regurgitation.   6. Mild tricuspid regurgitation.   (02 Jan 2025 20:34)    Patient seen and examined.     PHYSICAL EXAM:  General: NAD  HEENT: Normocephalic, atraumatic, EOMI, PEERLA. no scalp lacerations   Neck: Soft, midline trachea. no c-spine tenderness  Chest: No chest wall tenderness, no subcutaneous emphysema   Cardiac: Extremities well perfused  Respiratory: Bilateral breath sounds, clear and equal bilaterally  Abdomen: Soft, non-distended, non-tender, no rebound, no guarding.  Ext:  Palpable Radial b/l UE, b/l DP palpable in LE. Decreased ROM in RLE 2/2 pain, R hip tenderness  Back: No T/L/S spine tenderness, No palpable runoff/stepoff/deformity      Vital Signs Last 24 Hrs  T(C): 36.7 (03 Jan 2025 05:09), Max: 37.8 (02 Jan 2025 21:15)  T(F): 98.1 (03 Jan 2025 05:09), Max: 100.1 (02 Jan 2025 21:15)  HR: 80 (03 Jan 2025 05:09) (80 - 99)  BP: 148/64 (03 Jan 2025 05:09) (145/64 - 157/71)  BP(mean): --  RR: 18 (03 Jan 2025 05:09) (18 - 18)  SpO2: 94% (03 Jan 2025 05:09) (92% - 94%)    Parameters below as of 03 Jan 2025 05:09  Patient On (Oxygen Delivery Method): room air        Labs:  CAPILLARY BLOOD GLUCOSE      POCT Blood Glucose.: 146 mg/dL (03 Jan 2025 11:14)  POCT Blood Glucose.: 158 mg/dL (03 Jan 2025 07:52)                          14.6   8.61  )-----------( 141      ( 03 Jan 2025 07:50 )             44.1       Auto Neutrophil %: 76.0 % (01-03-25 @ 07:50)  Auto Immature Granulocyte %: 0.3 % (01-03-25 @ 07:50)    01-03    139  |  104  |  11  ----------------------------<  169[H]  4.4   |  24  |  0.6[L]      Calcium: 8.5 mg/dL (01-03-25 @ 07:50)      LFTs:             6.2  | 0.4  | 23       ------------------[61      ( 03 Jan 2025 07:50 )  4.0  | x    | 12          Lipase:x      Amylase:x             Coags:     13.40  ----< 1.13    ( 02 Jan 2025 14:11 )     31.0              Alcohol, Blood: <10 mg/dL (01-03-25 @ 07:50)    Urinalysis Basic - ( 03 Jan 2025 07:50 )    Color: x / Appearance: x / SG: x / pH: x  Gluc: 169 mg/dL / Ketone: x  / Bili: x / Urobili: x   Blood: x / Protein: x / Nitrite: x   Leuk Esterase: x / RBC: x / WBC x   Sq Epi: x / Non Sq Epi: x / Bacteria: x        Alcohol, Blood: <10 mg/dL (01-03-25 @ 07:50)    RADIOLOGICAL FINDINGS REVIEW:    CXR:    Pelvis Films:     C-Spine Films:    T/L/S Spine Films:    Extremity Films:    Head CT:    CT CERVICAL SPINE:    There is straightening of the cervical spine without spondylolisthesis.    There is no evidence of acute fracture, compression deformity or facet   subluxation of the cervical spine.    The atlantoaxial relationships are maintained. The posterior elements are   intact. There is no significant prevertebral soft tissue swelling. The   visualized paraspinal soft tissues are unremarkable.    Mild multilevel endplate degenerative changes with marginal osteophyte   formation, uncovertebral spurring, loss of normal disc space height as   well as facet joint space compartment narrowing.    There is no high-grade spinal canal stenosis. Please note spinal canal   contents are suboptimally evaluated inherent to CT technique.    The visualized lung apices are within normal limits.    FINDINGS:    CHEST:  FINDINGS:    Support devices: Left-sided permanent pacemaker.    Cardiac/mediastinum/hilum: Stable.    Lung parenchyma/Pleura: No focal parenchymal opacities, pleural   effusions, or pneumothorax.    Skeleton/soft tissues: Stable.      LUNGS, PLEURA, AIRWAYS: No lobar consolidation, mass, effusion, or   pneumothorax.No evidence of central endobronchial obstruction. No   bronchiectasis or honeycombing. No suspicious pulmonary nodule. Mild   paraseptal and centrilobular emphysema. Bilateral subsegmental   atelectasis.    THORACIC NODES: No mediastinal, hilar, supraclavicular, or axillary   lymphadenopathy.    MEDIASTINUM/GREAT VESSELS: No pericardial effusion. Heart size is within   normal limits. The aorta and main pulmonary artery are of normal caliber.   Left cardiac pacing device.    ABDOMEN/PELVIS:    HEPATOBILIARY: Unremarkable.    SPLEEN: Unremarkable.    PANCREAS: Unremarkable.    ADRENAL GLANDS: Unremarkable.    KIDNEYS: Symmetric pattern of renal enhancement. No hydronephrosis   bilaterally.    ABDOMINOPELVIC NODES: No lymphadenopathy.    PELVIC ORGANS: Post hysterectomy. Urinary bladder unremarkable.    PERITONEUM/MESENTERY/BOWEL: No bowel obstruction. No ascites or   pneumoperitoneum.    BONES/SOFT TISSUES: Acute, displaced right femoral neck fracture. Acute,   nondisplaced right anterior 6-7th rib fracture; no pneumothorax.    VASCULAR : Vascular calcifications..    OTHER:  FINDINGS /  IMPRESSION:    FEMUR: Redemonstrated displaced right femoral neck fracture. Excreted   contrast overlies the urinary bladder.    TIBIA/FIBULA: No acute displaced fracture. Osteopenia.    ASSESSMENT:    64yF w/ PMHx of Afib on Eliquis, Schizophrenia seen as Trauma Consult s/p found down for unknown period of time after tripping earlier today.   Trauma assessment in ED: ABCs intact , GCS 15 , AAOx3.     PLAN:   -No further traumatic workup indicated at this time  -Recommend PT/Phys/OT  -Recommend Incentive Spirometer  -As pateint's medical burden outweigh those of traumatic surgical injuries, patient may remain on medical service    - All images/reports reviewed. No further traumatic work-up warranted.        TRAUMA SURGERY SPECTRA: [id number]

## 2025-01-03 NOTE — CONSULT NOTE ADULT - NS ATTEND AMEND GEN_ALL_CORE FT
CHB s/p DC-PPM  Paroxysmal AF  Mechanical Fall    - Resume OAC when ok with surgery  - Patient will benefit from LAAC. Undecided at this moment. Continued discussion as OP  - Patient ok to proceed for hip surgery from EP standpoint of view. No routine pacemaker interrogation is needed  - OP f-up in 1 month

## 2025-01-03 NOTE — PROGRESS NOTE ADULT - SUBJECTIVE AND OBJECTIVE BOX
NITIN DEL TORO 64y Female  MRN#: [id number]     Hospital Day: 1d    Pt is currently admitted with the primary diagnosis of rt hip fx    Overnight events   -No major overnight events  - ortho for hip repair today                                          ----------------------------------------------------------  OBJECTIVE  PAST MEDICAL & SURGICAL HISTORY  Diabetes mellitus    Bipolar disorder in partial remission, most recent episode unspecified type  Last episodes six years ago    History of hysterectomy for malignancy                                              -----------------------------------------------------------  MEDICATIONS:  STANDING MEDICATIONS  acetaminophen     Tablet .. 650 milliGRAM(s) Oral every 6 hours  calcium acetate 667 milliGRAM(s) Oral three times a day with meals  dextrose 5% + lactated ringers. 1000 milliLiter(s) IV Continuous <Continuous>  dextrose 5%. 1000 milliLiter(s) IV Continuous <Continuous>  dextrose 5%. 1000 milliLiter(s) IV Continuous <Continuous>  dextrose 50% Injectable 25 Gram(s) IV Push once  dextrose 50% Injectable 12.5 Gram(s) IV Push once  dextrose 50% Injectable 25 Gram(s) IV Push once  glucagon  Injectable 1 milliGRAM(s) IntraMuscular once  influenza   Vaccine 0.5 milliLiter(s) IntraMuscular once  insulin lispro (ADMELOG) corrective regimen sliding scale   SubCutaneous three times a day before meals  lidocaine   4% Patch 1 Patch Transdermal daily  polyethylene glycol 3350 17 Gram(s) Oral two times a day    PRN MEDICATIONS  dextrose Oral Gel 15 Gram(s) Oral once PRN  morphine  - Injectable 2 milliGRAM(s) IV Push every 6 hours PRN  oxyCODONE    IR 5 milliGRAM(s) Oral every 6 hours PRN                                            ------------------------------------------------------------  VITAL SIGNS: Last 24 Hours  T(C): 36.7 (03 Jan 2025 05:09), Max: 37.8 (02 Jan 2025 21:15)  T(F): 98.1 (03 Jan 2025 05:09), Max: 100.1 (02 Jan 2025 21:15)  HR: 80 (03 Jan 2025 05:09) (80 - 104)  BP: 148/64 (03 Jan 2025 05:09) (119/77 - 157/71)  BP(mean): --  RR: 18 (03 Jan 2025 05:09) (18 - 18)  SpO2: 94% (03 Jan 2025 05:09) (92% - 94%)      01-03-25 @ 07:01  -  01-03-25 @ 11:07  --------------------------------------------------------  IN: 0 mL / OUT: 400 mL / NET: -400 mL                                             --------------------------------------------------------------  LABS:                        14.6   8.61  )-----------( 141      ( 03 Jan 2025 07:50 )             44.1     01-03    139  |  104  |  11  ----------------------------<  169[H]  4.4   |  24  |  0.6[L]    Ca    8.5      03 Jan 2025 07:50  Phos  2.4     01-03  Mg     2.1     01-03    TPro  6.2  /  Alb  4.0  /  TBili  0.4  /  DBili  x   /  AST  23  /  ALT  12  /  AlkPhos  61  01-03    PT/INR - ( 02 Jan 2025 14:11 )   PT: 13.40 sec;   INR: 1.13 ratio         PTT - ( 02 Jan 2025 14:11 )  PTT:31.0 sec  Urinalysis Basic - ( 03 Jan 2025 07:50 )    Color: x / Appearance: x / SG: x / pH: x  Gluc: 169 mg/dL / Ketone: x  / Bili: x / Urobili: x   Blood: x / Protein: x / Nitrite: x   Leuk Esterase: x / RBC: x / WBC x   Sq Epi: x / Non Sq Epi: x / Bacteria: x                                            --------------------------------------------------------------  PHYSICAL EXAM:  GENERAL: Awake, alert, oriented to person, place, time, situation. Well-nourished, laying in bed appearing in no acute distress  HEENT: No FNDs, atraumatic, normocephalic  LUNGS: Clear to auscultation bilaterally  HEART: S1/S2. No heaves or thrills  ABD: Soft, non-tender, non-distended.  EXT/NEURO: Strength, sensation and ROM grossly intact.  SKIN: No edema    PLAN:  64 y.o lady presenting for after a fall found to have acute, displaced right femoral neck fracture and acute, nondisplaced right anterior 6-7th rib fracture    Patient doesn't know the list of her medications. she gets them from [facility].  Kindly call pharmacy in AM; currently it is closed ([phone])    #Fall, reportedly mechanical  #Acute, displaced right femoral neck fracture.  #Acute, nondisplaced right anterior 6-7th rib fracture; no pneumothorax.  - CBC, CMP within normal limits  - OP f up with endocrinology to r/o osteoporosis (osteoporotic fracture)  - start calcium and vitamin D supplementation   - check vitamin D level   - incentive neymar   - pain management:      * tylenol 650 mg [id number] standing     * lidocaine patch     * oxycodone IR 5 mg every 6 hours PRN for moderate pain     * morphine 2 mg IV every 6 hours PRN for severe pain  - bowel regimen   - NPO after MN   - D5W/LR 50 cc per hour after midnight   - DVT: SCD LLE; one dose of lovenox 40 mg; re-dose tomorrow if cleared by ortho  - patient lives alone so likely will need discharge to rehab   - Ortho for repair today; f/u post op care, resume AC  - PT after repair    #CHB with narrow escape rhythm s/p DC PPM implant SJM  #A-fib on Eliquis  - ECG: V paced  - EP for device interrogation - short run of a fib 12/31l; device working properly. f/u OP    # Bilateral subsegmental atelectasis.  #Smoking with emphysematous changes   - seen on CT   - incentive neymar    #Smoking (nicotine)  - 1 pack per day   - offered nicotine patch -> didn't want it at this point    #DM   - sliding scale    #Diet: DASH, CC; NPO after MN   #DVT pro: SCD LLE, one dose of lovenox 40 mg; re-dose tomorrow if cleared by ortho  #GI pro: pantoprazole  #Activity: as tolerated  #Dispo: med  #Code status: full

## 2025-01-04 LAB
GLUCOSE BLDC GLUCOMTR-MCNC: 122 MG/DL — HIGH (ref 70–99)
GLUCOSE BLDC GLUCOMTR-MCNC: 153 MG/DL — HIGH (ref 70–99)
GLUCOSE BLDC GLUCOMTR-MCNC: 160 MG/DL — HIGH (ref 70–99)

## 2025-01-04 PROCEDURE — 99231 SBSQ HOSP IP/OBS SF/LOW 25: CPT

## 2025-01-04 RX ORDER — ENOXAPARIN SODIUM 60 MG/.6ML
40 INJECTION INTRAVENOUS; SUBCUTANEOUS ONCE
Refills: 0 | Status: COMPLETED | OUTPATIENT
Start: 2025-01-04 | End: 2025-01-04

## 2025-01-04 RX ADMIN — ACETAMINOPHEN 650 MILLIGRAM(S): 80 SOLUTION/ DROPS ORAL at 12:16

## 2025-01-04 RX ADMIN — CALCIUM ACETATE 667 MILLIGRAM(S): 667 CAPSULE ORAL at 17:50

## 2025-01-04 RX ADMIN — LIDOCAINE 1 PATCH: 50 OINTMENT TOPICAL at 20:52

## 2025-01-04 RX ADMIN — ACETAMINOPHEN 650 MILLIGRAM(S): 80 SOLUTION/ DROPS ORAL at 17:50

## 2025-01-04 RX ADMIN — CLINDAMYCIN HYDROCHLORIDE 100 MILLIGRAM(S): 300 CAPSULE ORAL at 15:32

## 2025-01-04 RX ADMIN — LIDOCAINE 1 PATCH: 50 OINTMENT TOPICAL at 00:20

## 2025-01-04 RX ADMIN — CLINDAMYCIN HYDROCHLORIDE 100 MILLIGRAM(S): 300 CAPSULE ORAL at 21:40

## 2025-01-04 RX ADMIN — ERGOCALCIFEROL 50000 UNIT(S): 1.25 CAPSULE ORAL at 15:32

## 2025-01-04 RX ADMIN — CLINDAMYCIN HYDROCHLORIDE 100 MILLIGRAM(S): 300 CAPSULE ORAL at 05:33

## 2025-01-04 RX ADMIN — LIDOCAINE 1 PATCH: 50 OINTMENT TOPICAL at 12:16

## 2025-01-04 NOTE — PROGRESS NOTE ADULT - SUBJECTIVE AND OBJECTIVE BOX
NITIN DEL TORO 64y Female  MRN#: [id number]     Hospital Day: 2d    Pt is currently admitted with the primary diagnosis of rt hip fx    Overnight events   -No major overnight events  -pt feeling well post repair                                          ----------------------------------------------------------  OBJECTIVE  PAST MEDICAL & SURGICAL HISTORY  Diabetes mellitus    Bipolar disorder in partial remission, most recent episode unspecified type  Last episodes six years ago    History of hysterectomy for malignancy                                              -----------------------------------------------------------  MEDICATIONS:  STANDING MEDICATIONS  acetaminophen     Tablet .. 650 milliGRAM(s) Oral every 6 hours  calcium acetate 667 milliGRAM(s) Oral three times a day with meals  clindamycin IVPB 600 milliGRAM(s) IV Intermittent every 8 hours  dextrose 5%. 1000 milliLiter(s) IV Continuous <Continuous>  dextrose 5%. 1000 milliLiter(s) IV Continuous <Continuous>  dextrose 50% Injectable 25 Gram(s) IV Push once  dextrose 50% Injectable 12.5 Gram(s) IV Push once  dextrose 50% Injectable 25 Gram(s) IV Push once  ergocalciferol 30091 Unit(s) Oral every week  glucagon  Injectable 1 milliGRAM(s) IntraMuscular once  influenza   Vaccine 0.5 milliLiter(s) IntraMuscular once  insulin lispro (ADMELOG) corrective regimen sliding scale   SubCutaneous three times a day before meals  lidocaine   4% Patch 1 Patch Transdermal daily  polyethylene glycol 3350 17 Gram(s) Oral two times a day    PRN MEDICATIONS  dextrose Oral Gel 15 Gram(s) Oral once PRN  morphine  - Injectable 2 milliGRAM(s) IV Push every 6 hours PRN  oxyCODONE    IR 5 milliGRAM(s) Oral every 6 hours PRN                                            ------------------------------------------------------------  VITAL SIGNS: Last 24 Hours  T(C): 37.2 (03 Jan 2025 21:45), Max: 37.7 (03 Jan 2025 20:45)  T(F): 99 (03 Jan 2025 21:45), Max: 99.9 (03 Jan 2025 20:45)  HR: 74 (03 Jan 2025 22:15) (67 - 97)  BP: 129/59 (03 Jan 2025 22:15) (129/59 - 192/81)  BP(mean): 114 (03 Jan 2025 17:41) (114 - 114)  RR: 15 (03 Jan 2025 22:15) (14 - 16)  SpO2: 92% (03 Jan 2025 22:15) (92% - 98%)      01-03-25 @ 07:01  -  01-04-25 @ 07:00  --------------------------------------------------------  IN: 375 mL / OUT: 400 mL / NET: -25 mL    01-04-25 @ 07:01  -  01-04-25 @ 12:08  --------------------------------------------------------  IN: 0 mL / OUT: 300 mL / NET: -300 mL                                             --------------------------------------------------------------  LABS:                        13.7   8.98  )-----------( 132      ( 03 Jan 2025 22:50 )             40.2     01-03    139  |  104  |  11  ----------------------------<  169[H]  4.4   |  24  |  0.6[L]    Ca    8.5      03 Jan 2025 07:50  Phos  2.4     01-03  Mg     2.1     01-03    TPro  6.2  /  Alb  4.0  /  TBili  0.4  /  DBili  x   /  AST  23  /  ALT  12  /  AlkPhos  61  01-03    PT/INR - ( 02 Jan 2025 14:11 )   PT: 13.40 sec;   INR: 1.13 ratio         PTT - ( 02 Jan 2025 14:11 )  PTT:31.0 sec  Urinalysis Basic - ( 03 Jan 2025 07:50 )    Color: x / Appearance: x / SG: x / pH: x  Gluc: 169 mg/dL / Ketone: x  / Bili: x / Urobili: x   Blood: x / Protein: x / Nitrite: x   Leuk Esterase: x / RBC: x / WBC x   Sq Epi: x / Non Sq Epi: x / Bacteria: x                                                            --------------------------------------------------------------  PHYSICAL EXAM:  GENERAL: A&Ox2, NAD  HEENT: atraumatic, normocephalic  LUNGS: Clear to auscultation bilaterally  HEART: S1/S2. No heaves or thrills  ABD: Soft, non-tender, non-distended.  EXT/NEURO: restricted motion in rt hip post repair  SKIN: No edema, no erythema  or strike through rt hip    PLAN:  64 y.o lady presenting for after a fall found to have acute, displaced right femoral neck fracture and acute, nondisplaced right anterior 6-7th rib fracture    Patient doesn't know the list of her medications. she gets them from [facility].  Kindly call pharmacy in AM; currently it is closed ([phone])    #Fall, reportedly mechanical  #Acute, displaced right femoral neck fracture.  #Acute, nondisplaced right anterior 6-7th rib fracture; no pneumothorax.  - CBC, CMP within normal limits  - OP f up with endocrinology to r/o osteoporosis (osteoporotic fracture)  - start calcium and vitamin D supplementation   - check vitamin D level   - incentive neymar   - pain management:      * tylenol 650 mg q8uwdyz standing     * lidocaine patch     * oxycodone IR 5 mg every 6 hours PRN for moderate pain     * morphine 2 mg IV every 6 hours PRN for severe pain  - bowel regimen   - NPO after MN   - D5W/LR 50 cc per hour after midnight   - DVT: SCD LLE; one dose of lovenox 40 mg; re-dose tomorrow if cleared by ortho  - patient lives alone so likely will need discharge to rehab   - Ortho for repair today; f/u post op care, resume AC  - PT + PMNR eval   -Clindamycin q8hr x3 doses for a total of 24hrs post-operatively per ortho  - resume AC this AM    #CHB with narrow escape rhythm s/p DC PPM implant SJM  #A-fib on Eliquis  - ECG: V paced  - EP for device interrogation - short run of a fib 12/31l; device working properly. f/u OP    # Bilateral subsegmental atelectasis.  #Smoking with emphysematous changes   - seen on CT   - incentive neymar    #Smoking (nicotine)  - 1 pack per day   - offered nicotine patch -> didn't want it at this point    #DM   - sliding scale    #Diet: DASH   #DVT pro: lovenox 40 mg;  #GI pro: pantoprazole  #Activity: as tolerated  #Dispo: med  #Code status: full

## 2025-01-04 NOTE — PHYSICAL THERAPY INITIAL EVALUATION ADULT - CRITERIA FOR SKILLED THERAPEUTIC INTERVENTIONS
impairments found Melolabial Transposition Flap Text: The defect edges were debeveled with a #15 scalpel blade.  Given the location of the defect and the proximity to free margins a melolabial flap was deemed most appropriate.  Using a sterile surgical marker, an appropriate melolabial transposition flap was drawn incorporating the defect.    The area thus outlined was incised deep to adipose tissue with a #15 scalpel blade.  The skin margins were undermined to an appropriate distance in all directions utilizing iris scissors.

## 2025-01-04 NOTE — PHYSICAL THERAPY INITIAL EVALUATION ADULT - PERTINENT HX OF CURRENT PROBLEM, REHAB EVAL
64 Year old F presenting to the ED after an unwitnessed ground-level fall; after tripping.   Patient is coming by her , states that she came to pick her up for a PMD visit and found her outside of her apartment laying on the ground for an unknown period of time.    Patient states that she tripped and fell landing on her right hip which is causing her significant pain.    She denies LOC, denies head strike.      Of note, patient had a fall 1 week ago, also ground-level fall but eloped prior to receiving any medical treatment.  Back then she reports that she had lost control of both of her legs which caused her fall.

## 2025-01-04 NOTE — PROGRESS NOTE ADULT - ATTENDING COMMENTS
patient seen and examined earlier today on rounds with resident and nurse.  slight pain right hip but in general pain is controlled.  seen later by physiatry and may be a candidate for acute rehab.  seen by ortho - recommended to resume DVT prophylaxis - patient on apixaban at home for atrial fibrillation.  given one dose enoxaparin today so tomorrow will start back on apixaban.  otherwise assessment and plan as noted in nidia boss.
rehab  OOB   discharge planning

## 2025-01-04 NOTE — PROGRESS NOTE ADULT - SUBJECTIVE AND OBJECTIVE BOX
ORTHOPAEDIC SURGERY PROGRESS NOTE    Interval History:  Patient seen and examined at bedside.  Pain is controlled.  No complaints this AM    MEDICATIONS  (STANDING):  acetaminophen     Tablet .. 650 milliGRAM(s) Oral every 6 hours  calcium acetate 667 milliGRAM(s) Oral three times a day with meals  clindamycin IVPB 600 milliGRAM(s) IV Intermittent every 8 hours  dextrose 5%. 1000 milliLiter(s) (50 mL/Hr) IV Continuous <Continuous>  dextrose 5%. 1000 milliLiter(s) (100 mL/Hr) IV Continuous <Continuous>  dextrose 50% Injectable 25 Gram(s) IV Push once  dextrose 50% Injectable 12.5 Gram(s) IV Push once  dextrose 50% Injectable 25 Gram(s) IV Push once  ergocalciferol 01701 Unit(s) Oral every week  glucagon  Injectable 1 milliGRAM(s) IntraMuscular once  influenza   Vaccine 0.5 milliLiter(s) IntraMuscular once  insulin lispro (ADMELOG) corrective regimen sliding scale   SubCutaneous three times a day before meals  lidocaine   4% Patch 1 Patch Transdermal daily  polyethylene glycol 3350 17 Gram(s) Oral two times a day    MEDICATIONS  (PRN):  dextrose Oral Gel 15 Gram(s) Oral once PRN Blood Glucose LESS THAN 70 milliGRAM(s)/deciliter  morphine  - Injectable 2 milliGRAM(s) IV Push every 6 hours PRN Severe Pain (7 - 10)  oxyCODONE    IR 5 milliGRAM(s) Oral every 6 hours PRN Moderate Pain (4 - 6)      Vital Signs Last 24 Hrs  T(C): 37.2 (03 Jan 2025 21:45), Max: 37.7 (03 Jan 2025 20:45)  T(F): 99 (03 Jan 2025 21:45), Max: 99.9 (03 Jan 2025 20:45)  HR: 74 (03 Jan 2025 22:15) (67 - 97)  BP: 129/59 (03 Jan 2025 22:15) (129/59 - 192/81)  BP(mean): 114 (03 Jan 2025 17:41) (114 - 114)  RR: 15 (03 Jan 2025 22:15) (14 - 16)  SpO2: 92% (03 Jan 2025 22:15) (92% - 98%)    Physical Exam:   RLE  Dressing c/d/i  SILT s/s/sp/dp/t  Motor intact TA/EHL/FHL/GS  Digits wwp                          13.7   8.98  )-----------( 132      ( 03 Jan 2025 22:50 )             40.2     01-03    139  |  104  |  11  ----------------------------<  169[H]  4.4   |  24  |  0.6[L]    Ca    8.5      03 Jan 2025 07:50  Phos  2.4     01-03  Mg     2.1     01-03    TPro  6.2  /  Alb  4.0  /  TBili  0.4  /  DBili  x   /  AST  23  /  ALT  12  /  AlkPhos  61  01-03    PT/INR - ( 02 Jan 2025 14:11 )   PT: 13.40 sec;   INR: 1.13 ratio         PTT - ( 02 Jan 2025 14:11 )  PTT:31.0 sec  Urinalysis Basic - ( 03 Jan 2025 07:50 )    Color: x / Appearance: x / SG: x / pH: x  Gluc: 169 mg/dL / Ketone: x  / Bili: x / Urobili: x   Blood: x / Protein: x / Nitrite: x   Leuk Esterase: x / RBC: x / WBC x   Sq Epi: x / Non Sq Epi: x / Bacteria: x        01-03-25 @ 07:01  -  01-04-25 @ 07:00  --------------------------------------------------------  IN: 375 mL / OUT: 400 mL / NET: -25 mL        A/P: 64yFemale POD1 s/p R hip hemiarthroplasty on 1/3, doing well.    Recommend obtaining urinalysis post op. Patient was febrile at time of induction of anesthesia     - Activity: WBAT RLE  - Abx: Clindamycin q8hr x3 doses for a total of 24hrs post-operatively  - DVT PPx: Resume chemoprophylaxis this AM.   - Pain control  - IS encouraged  - AM labs  - PT/Rehab  - D/C planning    - If discharged, patient should follow up with Dr. Meng at [address] [facility] phone [phone].  - Patient should be discharged on appropriate DVT prophylaxis for 6 weeks (from surgery date). Orthopaedic preference is Aspirin 81mg twice daily for DVT prophylaxis unless contraindication or all ready on home anticoagulation for which they may continue instead as their mobility/function/ambulation will be decreased due to lower extremity orthopaedic surgery.

## 2025-01-04 NOTE — CONSULT NOTE ADULT - SUBJECTIVE AND OBJECTIVE BOX
HPI:  64-year-old F with PMH:   - schizophrenia  - CHB with narrow escape rhythm s/p DC PPM implant SJM  - A-fib on Eliquis  - Mild-to-moderate mitral regurgitation  - Post hysterectomy  - HTN   - DM     presenting to the ED after an unwitnessed ground-level fall; after tripping.   Patient is coming by her , states that she came to pick her up for a PMD visit and found her outside of her apartment laying on the ground for an unknown period of time.    Patient states that she tripped and fell landing on her right hip which is causing her significant pain.    She denies LOC, denies head strike.      Of note, patient had a fall 1 week ago, also ground-level fall but eloped prior to receiving any medical treatment.  Back then she reports that she had lost control of both of her legs which caused her fall.      Today, she denies shortness of breath, chest pain, abdominal pain, nausea, vomiting.    Denies recent fever, chills    Patient is not presenting from [facility]; patient denied it and I called the [facility] and they confirmed that she is not currently admitted there.     CT HEAD:  No acute intracranial findings.    Stable mild-moderate chronic microvascular ischemic changes.    CT CERVICAL SPINE:  No acute cervical fracture or facet subluxation.      CT abdomen and pelvis w IV contrast:  Acute, displaced right femoral neck fracture.  Acute, nondisplaced right anterior 6-7th rib fracture; no pneumothorax.    Xray tibia fibula: TIBIA/FIBULA: No acute displaced fracture. Osteopenia.    TTE 2023:   1. Left ventricular ejection fraction, by visual estimation, is 65 to 70%.   2. Normal global left ventricular systolic function.   3. The left ventricular diastolic function could not be assessed in this study.   4. Mildly enlarged left atrium.   5. Mild-to-moderate mitral regurgitation.   6. Mild tricuspid regurgitation.      S/p R hip hemiarthroplasty on 1/3/25,- Activity: WBAT RLE as per ortho         PAST MEDICAL & SURGICAL HISTORY:  Diabetes mellitus      Bipolar disorder in partial remission, most recent episode unspecified type  Last episodes six years ago      History of hysterectomy for malignancy          Hospital Course:    TODAY'S SUBJECTIVE & REVIEW OF SYMPTOMS:     Constitutional WNL   Cardio WNL   Resp WNL   GI WNL  Heme WNL  Endo WNL  Skin WNL  MSK pain   Neuro WNL  Cognitive WNL  Psych WNL      MEDICATIONS  (STANDING):  acetaminophen     Tablet .. 650 milliGRAM(s) Oral every 6 hours  calcium acetate 667 milliGRAM(s) Oral three times a day with meals  clindamycin IVPB 600 milliGRAM(s) IV Intermittent every 8 hours  dextrose 5%. 1000 milliLiter(s) (50 mL/Hr) IV Continuous <Continuous>  dextrose 5%. 1000 milliLiter(s) (100 mL/Hr) IV Continuous <Continuous>  dextrose 50% Injectable 25 Gram(s) IV Push once  dextrose 50% Injectable 12.5 Gram(s) IV Push once  dextrose 50% Injectable 25 Gram(s) IV Push once  ergocalciferol 31546 Unit(s) Oral every week  glucagon  Injectable 1 milliGRAM(s) IntraMuscular once  influenza   Vaccine 0.5 milliLiter(s) IntraMuscular once  insulin lispro (ADMELOG) corrective regimen sliding scale   SubCutaneous three times a day before meals  lidocaine   4% Patch 1 Patch Transdermal daily  polyethylene glycol 3350 17 Gram(s) Oral two times a day    MEDICATIONS  (PRN):  dextrose Oral Gel 15 Gram(s) Oral once PRN Blood Glucose LESS THAN 70 milliGRAM(s)/deciliter  morphine  - Injectable 2 milliGRAM(s) IV Push every 6 hours PRN Severe Pain (7 - 10)  oxyCODONE    IR 5 milliGRAM(s) Oral every 6 hours PRN Moderate Pain (4 - 6)      FAMILY HISTORY:      Allergies    penicillin (Other)    Intolerances        SOCIAL HISTORY:    [  ] Etoh  [  ] Smoking  [  ] Substance abuse     Home Environment:  [ x  ] Home Alone  [   ] Lives with Family  [   ] Home Health Aid    Dwelling:  [ x  ] Apartment  [   ] Private House  [   ] Adult Home  [   ] Skilled Nursing Facility      [   ] Short Term  [   ] Long Term  [   ] Stairs       Elevator [  x ]    FUNCTIONAL STATUS PTA: (Check all that apply)  Ambulation: [   x ]Independent    [   ] Dependent     [   ] Non-Ambulatory  Assistive Device: [   ] SA Cane  [   ]  Q Cane  [   ] Walker  [   ]  Wheelchair  ADL : [  x ] Independent  [    ]  Dependent       Vital Signs Last 24 Hrs  T(C): 37.2 (03 Jan 2025 21:45), Max: 37.7 (03 Jan 2025 20:45)  T(F): 99 (03 Jan 2025 21:45), Max: 99.9 (03 Jan 2025 20:45)  HR: 74 (03 Jan 2025 22:15) (67 - 97)  BP: 129/59 (03 Jan 2025 22:15) (129/59 - 192/81)  BP(mean): 114 (03 Jan 2025 17:41) (114 - 114)  RR: 15 (03 Jan 2025 22:15) (14 - 16)  SpO2: 92% (03 Jan 2025 22:15) (92% - 98%)    Parameters below as of 03 Jan 2025 20:45  Patient On (Oxygen Delivery Method): nasal cannula  O2 Flow (L/min): 3        PHYSICAL EXAM: Awake & Alert  GENERAL: NAD  HEAD:  Normocephalic  CHEST/LUNG: Clear   HEART: S1S2+  ABDOMEN: Soft, Nontender  EXTREMITIES:  no calf tenderness, chico knee abrasions     NERVOUS SYSTEM:  Cranial Nerves 2-12 intact [   ] Abnormal  [   ]  ROM: WFL all extremities [   ]  Abnormal [  x ]limited RLE   Motor Strength: WFL all extremities  [   ]  Abnormal [  x ]limited chico LE  Sensation: intact to light touch [ x  ] Abnormal [   ]    FUNCTIONAL STATUS:  Bed Mobility: Independent [   ]  Supervision [   ]  Needs Assistance [x   ]  N/A [   ]  Transfers: Independent [   ]  Supervision [   ]  Needs Assistance [   ]  N/A [   ]   Ambulation: Independent [   ]  Supervision [   ]  Needs Assistance [   ]  N/A [   ]  ADL: Independent [   ] Requires Assistance [   ] N/A [   ]      LABS:                        13.7   8.98  )-----------( 132      ( 03 Jan 2025 22:50 )             40.2     01-03    139  |  104  |  11  ----------------------------<  169[H]  4.4   |  24  |  0.6[L]    Ca    8.5      03 Jan 2025 07:50  Phos  2.4     01-03  Mg     2.1     01-03    TPro  6.2  /  Alb  4.0  /  TBili  0.4  /  DBili  x   /  AST  23  /  ALT  12  /  AlkPhos  61  01-03    PT/INR - ( 02 Jan 2025 14:11 )   PT: 13.40 sec;   INR: 1.13 ratio         PTT - ( 02 Jan 2025 14:11 )  PTT:31.0 sec  Urinalysis Basic - ( 03 Jan 2025 07:50 )    Color: x / Appearance: x / SG: x / pH: x  Gluc: 169 mg/dL / Ketone: x  / Bili: x / Urobili: x   Blood: x / Protein: x / Nitrite: x   Leuk Esterase: x / RBC: x / WBC x   Sq Epi: x / Non Sq Epi: x / Bacteria: x        RADIOLOGY & ADDITIONAL STUDIES:

## 2025-01-04 NOTE — CONSULT NOTE ADULT - ASSESSMENT
ASSESSMENT:    64yF w/ PMHx of Afib on Eliquis, Schizophrenia seen as Trauma Consult s/p found down for unknown period of time after tripping earlier today.   Trauma assessment in ED: ABCs intact , GCS 15 , AAOx3.     PLAN:     -No further traumatic workup indicated at this time  -Recommend PT/Phys/OT  -Orthopedic Surgery consult  -Recommend Incentive Spirometer  -As pateint's medical burden outweigh those of traumatic surgical injuries, patient may remain on medical service with surgical team to perform a tertiary survey tomorrow    Trauma [id number]     Disposition pending results of above labs and imaging  Above plan discussed with Trauma attending, Dr. Whitley  , patient, patient family, and ED team  --------------------------------------------------------------------------------------  
IMPRESSION: Rehab of multitrauma / s/p fall with right hip displaced femoral neck fx, s/p ORIF, R 6-7 rib fx, chico knee abrasion, no head trauma, no LOC  / Afib on Eliquis, Schizophrenia, HTN,  DM,  CHB with narrow escape rhythm s/p DC PPM implant SJM    PRECAUTIONS: [   ] Cardiac  [   ] Respiratory  [   ] Seizures [   ] Contact Isolation  [   ] Droplet Isolation  [   ] Other    Weight Bearing Status: WBAT RLE    RECOMMENDATION:    Out of Bed to Chair     DVT/Decubiti Prophylaxis    REHAB PLAN:     [  x  ] Bedside P/T 3-5 times a week   [  x  ]   Bedside O/T  2-3 times a week             [    ] Speech Therapy               [    ]  No Rehab Therapy Indicated   Conditioning/ROM                                    ADL  Bed Mobility                                               Conditioning/ROM  Transfers                                                     Bed Mobility  Sitting /Standing Balance                         Transfers                                        Gait Training                                               Sitting/Standing Balance  Stair Training [   ]Applicable                    Home equipment Eval                                                                        Splinting  [   ] Only      GOALS:   ADL   [ x ]   Independent                    Transfers  [ x   ] Independent                          Ambulation  [  x  ] Independent     [   x  ] With device                            [    ]  CG                                                         [    ]  CG                                                                  [    ] CG                            [    ] Min A                                                   [    ] Min A                                                              [    ] Min  A          DISCHARGE PLAN:   [    ]  Good candidate for Intensive Rehabilitation/Hospital based                                             Will tolerate 3hrs Intensive Rehab Daily                                       [     ]  Short Term Rehab in Skilled Nursing Facility                                       [     ]  Home with Outpatient or  services                                         [  x   ]  Possible Candidate for Intensive Hospital based Rehab                                        
64 year old female with the past medical history of diabetes mellitus, bipolar disorder, atrial fibrillation on Eliquis presented to the ED after an unwitnessed ground-level fall; after tripping. Per H&P her , states that she came to pick her up for a PMD visit and found her outside of her apartment laying on the ground for an unknown period of time.  Patient states that she tripped and fell landing on her right hip which is causing her significant pain. She denies LOC, denies head strike. She had a fall 1 week ago, also ground-level fall but eloped prior to receiving any medical treatment.  Back then she reports that she had lost control of both of her legs which caused her fall. Orthopaedics evaluated the patient who is tentatively plan for right hip hemiarthroplasty. Electrophysiology was consulted for a permanent pacemaker interrogation. The patient has known atrial fibrillation however considering multiple falls the patient may benefit from a left atrial closure device implantation.     Left atrial closure device implant will require anticoagulation for 45 days after implant following which a KEM will be repeated. If there is no leak paravalvular lead were noted the patient will remain on aspirin and Plavix for next month, and then ASA only. There is approximately 1-2% chance of any major cardiovascular complication to occur. Complications include, but are not limited to infection, bleeding, damage to the vessels, hole in the heart, stroke, death and heart attack. The patient understands the risk and would like to proceed with the procedure. All questions were answered to the patients satisfaction.     [facility] ([id number]) Permanent pacemaker is functioning normally.     Plan:   - The patient can follow up with electrophysiology as out patient for further discussion regarding left atrial closure device implantation  - She has a [id number] of 3 and should be continued on anticoagulation if no contraindications exist.   - No contraindication from an electrophysiology perspective for planned orthopaedic surgery.

## 2025-01-04 NOTE — PHYSICAL THERAPY INITIAL EVALUATION ADULT - LEVEL OF INDEPENDENCE: GAIT, REHAB EVAL
Patient declined. After standing up, patient sat back down immediately and declined continuing further with OOB transfer or ambulation. Patient was educated on importance of OOB activities and participating in early ambulation. However patient continued to decline.

## 2025-01-04 NOTE — PHYSICAL THERAPY INITIAL EVALUATION ADULT - IMPAIRED TRANSFERS: SIT/STAND, REHAB EVAL
Patient c/o light headedness upon standing. BP in sittin/80, HR: 83/impaired balance/decreased strength

## 2025-01-05 LAB
ALBUMIN SERPL ELPH-MCNC: 3.3 G/DL — LOW (ref 3.5–5.2)
ALP SERPL-CCNC: 50 U/L — SIGNIFICANT CHANGE UP (ref 30–115)
ALT FLD-CCNC: 14 U/L — SIGNIFICANT CHANGE UP (ref 0–41)
ANION GAP SERPL CALC-SCNC: 9 MMOL/L — SIGNIFICANT CHANGE UP (ref 7–14)
AST SERPL-CCNC: 40 U/L — SIGNIFICANT CHANGE UP (ref 0–41)
BASOPHILS # BLD AUTO: 0.01 K/UL — SIGNIFICANT CHANGE UP (ref 0–0.2)
BASOPHILS NFR BLD AUTO: 0.1 % — SIGNIFICANT CHANGE UP (ref 0–1)
BILIRUB SERPL-MCNC: 0.3 MG/DL — SIGNIFICANT CHANGE UP (ref 0.2–1.2)
BUN SERPL-MCNC: 18 MG/DL — SIGNIFICANT CHANGE UP (ref 10–20)
CALCIUM SERPL-MCNC: 7.9 MG/DL — LOW (ref 8.4–10.5)
CHLORIDE SERPL-SCNC: 101 MMOL/L — SIGNIFICANT CHANGE UP (ref 98–110)
CO2 SERPL-SCNC: 26 MMOL/L — SIGNIFICANT CHANGE UP (ref 17–32)
CREAT SERPL-MCNC: 0.7 MG/DL — SIGNIFICANT CHANGE UP (ref 0.7–1.5)
EGFR: 97 ML/MIN/1.73M2 — SIGNIFICANT CHANGE UP
EOSINOPHIL # BLD AUTO: 0.02 K/UL — SIGNIFICANT CHANGE UP (ref 0–0.7)
EOSINOPHIL NFR BLD AUTO: 0.2 % — SIGNIFICANT CHANGE UP (ref 0–8)
GLUCOSE BLDC GLUCOMTR-MCNC: 132 MG/DL — HIGH (ref 70–99)
GLUCOSE BLDC GLUCOMTR-MCNC: 161 MG/DL — HIGH (ref 70–99)
GLUCOSE BLDC GLUCOMTR-MCNC: 174 MG/DL — HIGH (ref 70–99)
GLUCOSE BLDC GLUCOMTR-MCNC: 174 MG/DL — HIGH (ref 70–99)
GLUCOSE SERPL-MCNC: 133 MG/DL — HIGH (ref 70–99)
HCT VFR BLD CALC: 38 % — SIGNIFICANT CHANGE UP (ref 37–47)
HGB BLD-MCNC: 12.5 G/DL — SIGNIFICANT CHANGE UP (ref 12–16)
IMM GRANULOCYTES NFR BLD AUTO: 0.5 % — HIGH (ref 0.1–0.3)
LYMPHOCYTES # BLD AUTO: 1.34 K/UL — SIGNIFICANT CHANGE UP (ref 1.2–3.4)
LYMPHOCYTES # BLD AUTO: 16.7 % — LOW (ref 20.5–51.1)
MAGNESIUM SERPL-MCNC: 2 MG/DL — SIGNIFICANT CHANGE UP (ref 1.8–2.4)
MCHC RBC-ENTMCNC: 31.9 PG — HIGH (ref 27–31)
MCHC RBC-ENTMCNC: 32.9 G/DL — SIGNIFICANT CHANGE UP (ref 32–37)
MCV RBC AUTO: 96.9 FL — SIGNIFICANT CHANGE UP (ref 81–99)
MONOCYTES # BLD AUTO: 0.67 K/UL — HIGH (ref 0.1–0.6)
MONOCYTES NFR BLD AUTO: 8.4 % — SIGNIFICANT CHANGE UP (ref 1.7–9.3)
NEUTROPHILS # BLD AUTO: 5.94 K/UL — SIGNIFICANT CHANGE UP (ref 1.4–6.5)
NEUTROPHILS NFR BLD AUTO: 74.1 % — SIGNIFICANT CHANGE UP (ref 42.2–75.2)
NRBC # BLD: 0 /100 WBCS — SIGNIFICANT CHANGE UP (ref 0–0)
PHOSPHATE SERPL-MCNC: 2.3 MG/DL — SIGNIFICANT CHANGE UP (ref 2.1–4.9)
PLATELET # BLD AUTO: 141 K/UL — SIGNIFICANT CHANGE UP (ref 130–400)
PMV BLD: 10.9 FL — HIGH (ref 7.4–10.4)
POTASSIUM SERPL-MCNC: 4.4 MMOL/L — SIGNIFICANT CHANGE UP (ref 3.5–5)
POTASSIUM SERPL-SCNC: 4.4 MMOL/L — SIGNIFICANT CHANGE UP (ref 3.5–5)
PROT SERPL-MCNC: 5.6 G/DL — LOW (ref 6–8)
RBC # BLD: 3.92 M/UL — LOW (ref 4.2–5.4)
RBC # FLD: 13.3 % — SIGNIFICANT CHANGE UP (ref 11.5–14.5)
SODIUM SERPL-SCNC: 136 MMOL/L — SIGNIFICANT CHANGE UP (ref 135–146)
WBC # BLD: 8.02 K/UL — SIGNIFICANT CHANGE UP (ref 4.8–10.8)
WBC # FLD AUTO: 8.02 K/UL — SIGNIFICANT CHANGE UP (ref 4.8–10.8)

## 2025-01-05 PROCEDURE — 99232 SBSQ HOSP IP/OBS MODERATE 35: CPT

## 2025-01-05 RX ORDER — CALCIUM CARBONATE 750 MG/1
1 TABLET, CHEWABLE ORAL
Refills: 0 | Status: DISCONTINUED | OUTPATIENT
Start: 2025-01-05 | End: 2025-01-07

## 2025-01-05 RX ORDER — APIXABAN 5 MG/1
5 TABLET, FILM COATED ORAL
Refills: 0 | Status: DISCONTINUED | OUTPATIENT
Start: 2025-01-05 | End: 2025-01-05

## 2025-01-05 RX ORDER — ASPIRIN 81 MG
81 TABLET, DELAYED RELEASE (ENTERIC COATED) ORAL
Refills: 0 | Status: DISCONTINUED | OUTPATIENT
Start: 2025-01-05 | End: 2025-01-07

## 2025-01-05 RX ORDER — SODIUM CHLORIDE 9 MG/ML
500 INJECTION, SOLUTION INTRAVENOUS ONCE
Refills: 0 | Status: COMPLETED | OUTPATIENT
Start: 2025-01-05 | End: 2025-01-05

## 2025-01-05 RX ADMIN — SODIUM CHLORIDE 500 MILLILITER(S): 9 INJECTION, SOLUTION INTRAVENOUS at 12:50

## 2025-01-05 RX ADMIN — ACETAMINOPHEN 650 MILLIGRAM(S): 80 SOLUTION/ DROPS ORAL at 12:49

## 2025-01-05 RX ADMIN — LIDOCAINE 1 PATCH: 50 OINTMENT TOPICAL at 12:02

## 2025-01-05 RX ADMIN — ACETAMINOPHEN 650 MILLIGRAM(S): 80 SOLUTION/ DROPS ORAL at 17:28

## 2025-01-05 RX ADMIN — LIDOCAINE 1 PATCH: 50 OINTMENT TOPICAL at 20:31

## 2025-01-05 RX ADMIN — LIDOCAINE 1 PATCH: 50 OINTMENT TOPICAL at 00:00

## 2025-01-05 RX ADMIN — CALCIUM ACETATE 667 MILLIGRAM(S): 667 CAPSULE ORAL at 12:02

## 2025-01-05 NOTE — PROGRESS NOTE ADULT - SUBJECTIVE AND OBJECTIVE BOX
patient seen and examined.  has pain right side at site of hip surgery but reports tolerable with pain medication.  had episode of low BP while sitting and so didn't participate in PT.  to get 500 cc lactated ringers and monitor.  no bleeding.  eating but not eating much.  Vital Signs Last 24 Hrs  T(C): 36.5 (05 Jan 2025 12:59), Max: 36.9 (04 Jan 2025 20:19)  T(F): 97.7 (05 Jan 2025 12:59), Max: 98.4 (04 Jan 2025 20:19)  HR: 81 (05 Jan 2025 12:59) (81 - 92)  BP: 126/71 (05 Jan 2025 12:59) (98/59 - 126/71)  BP(mean): 89 (05 Jan 2025 12:59) (72 - 89)  RR: 18 (04 Jan 2025 20:19) (18 - 18)  SpO2: 97% (05 Jan 2025 12:59) (91% - 97%)    Parameters below as of 05 Jan 2025 12:59  Patient On (Oxygen Delivery Method): room air  conj pink, no jaundice  neck supple. no JVD  left chest wall pos. pacemaker. not red or tender  heart regular rate and rhythm. no murmur or gallop  lungs clear to auscultation. no crackles no wheezing  abd BS pos. soft nontender  extr no edema. right hip has dressing over surgical site. no eccymoses, blood, discharge noted around the dressing.    Labs:                        12.5   8.02  )-----------( 141      ( 05 Jan 2025 06:18 )             38.0   01-05    136  |  101  |  18  ----------------------------<  133[H]  4.4   |  26  |  0.7    Ca    7.9[L]      05 Jan 2025 06:18  Phos  2.3     01-05  Mg     2.0     01-05    TPro  5.6[L]  /  Alb  3.3[L]  /  TBili  0.3  /  DBili  x   /  AST  40  /  ALT  14  /  AlkPhos  50  01-05    CAPILLARY BLOOD GLUCOSE      POCT Blood Glucose.: 132 mg/dL (05 Jan 2025 11:19)  POCT Blood Glucose.: 161 mg/dL (05 Jan 2025 07:34)  POCT Blood Glucose.: 160 mg/dL (04 Jan 2025 21:16)  POCT Blood Glucose.: 122 mg/dL (04 Jan 2025 16:28)      MEDICATIONS  (STANDING):  acetaminophen     Tablet .. 650 milliGRAM(s) Oral every 6 hours  apixaban 5 milliGRAM(s) Oral two times a day  calcium acetate 667 milliGRAM(s) Oral three times a day with meals  dextrose 5%. 1000 milliLiter(s) (50 mL/Hr) IV Continuous <Continuous>  dextrose 5%. 1000 milliLiter(s) (100 mL/Hr) IV Continuous <Continuous>  dextrose 50% Injectable 25 Gram(s) IV Push once  dextrose 50% Injectable 12.5 Gram(s) IV Push once  dextrose 50% Injectable 25 Gram(s) IV Push once  ergocalciferol 08502 Unit(s) Oral every week  glucagon  Injectable 1 milliGRAM(s) IntraMuscular once  influenza   Vaccine 0.5 milliLiter(s) IntraMuscular once  insulin lispro (ADMELOG) corrective regimen sliding scale   SubCutaneous three times a day before meals  lidocaine   4% Patch 1 Patch Transdermal daily  polyethylene glycol 3350 17 Gram(s) Oral two times a day

## 2025-01-05 NOTE — PROGRESS NOTE ADULT - SUBJECTIVE AND OBJECTIVE BOX
Orthopaedic Surgery Progress Note    S&E at bedside this AM. Pain well controlled. Denies fever/chills/CP/SOB. No other complaints    T(C): 36.9 (01-04-25 @ 20:19), Max: 36.9 (01-04-25 @ 12:55)  HR: 92 (01-04-25 @ 20:19) (90 - 92)  BP: 98/59 (01-04-25 @ 20:19) (98/59 - 132/67)  RR: 18 (01-04-25 @ 20:19) (18 - 18)  SpO2: 91% (01-04-25 @ 20:19) (91% - 96%)    P/E:  Alert, NAD  Nonlabored breathing    RLE  Dressing c/d/i  SILT s/s/sp/dp/t  Motor intact TA/EHL/FHL/GS  Digits [facility]  Labs                        13.7   8.98  )-----------( 132      ( 03 Jan 2025 22:50 )             40.2     01-03    139  |  104  |  11  ----------------------------<  169[H]  4.4   |  24  |  0.6[L]    Ca    8.5      03 Jan 2025 07:50  Phos  2.4     01-03  Mg     2.1     01-03    TPro  6.2  /  Alb  4.0  /  TBili  0.4  /  DBili  x   /  AST  23  /  ALT  12  /  AlkPhos  61  01-03    LIVER FUNCTIONS - ( 03 Jan 2025 07:50 )  Alb: 4.0 g/dL / Pro: 6.2 g/dL / ALK PHOS: 61 U/L / ALT: 12 U/L / AST: 23 U/L / GGT: x           A/P:   64yFemale POD2 s/p R hip hemiarthroplasty on 1/3, doing well.    Recommend obtaining urinalysis post op. Patient was febrile at time of induction of anesthesia     - Activity: WBAT RLE  - Abx: Clindamycin q8hr x3 doses for a total of 24hrs post-operatively  - DVT PPx: Lovenox  - Pain control  - IS encouraged  - AM labs  - PT/Rehab  - D/C planning    - If discharged, patient should follow up with Dr. Meng at [address] [facility] [address] phone [phone].  - Patient should be discharged on appropriate DVT prophylaxis for 6 weeks (from surgery date). Orthopaedic preference is Aspirin 81mg twice daily for DVT prophylaxis unless contraindication or all ready on home anticoagulation for which they may continue instead as their mobility/function/ambulation will be decreased due to lower extremity orthopaedic surgery.

## 2025-01-06 ENCOUNTER — TRANSCRIPTION ENCOUNTER (OUTPATIENT)
Age: 65
End: 2025-01-06

## 2025-01-06 LAB
ALBUMIN SERPL ELPH-MCNC: 3.1 G/DL — LOW (ref 3.5–5.2)
ALP SERPL-CCNC: 55 U/L — SIGNIFICANT CHANGE UP (ref 30–115)
ALT FLD-CCNC: 14 U/L — SIGNIFICANT CHANGE UP (ref 0–41)
ANION GAP SERPL CALC-SCNC: 10 MMOL/L — SIGNIFICANT CHANGE UP (ref 7–14)
AST SERPL-CCNC: 29 U/L — SIGNIFICANT CHANGE UP (ref 0–41)
BASOPHILS # BLD AUTO: 0 K/UL — SIGNIFICANT CHANGE UP (ref 0–0.2)
BASOPHILS NFR BLD AUTO: 0 % — SIGNIFICANT CHANGE UP (ref 0–1)
BILIRUB SERPL-MCNC: 0.3 MG/DL — SIGNIFICANT CHANGE UP (ref 0.2–1.2)
BUN SERPL-MCNC: 14 MG/DL — SIGNIFICANT CHANGE UP (ref 10–20)
CALCIUM SERPL-MCNC: 8 MG/DL — LOW (ref 8.4–10.4)
CHLORIDE SERPL-SCNC: 104 MMOL/L — SIGNIFICANT CHANGE UP (ref 98–110)
CO2 SERPL-SCNC: 26 MMOL/L — SIGNIFICANT CHANGE UP (ref 17–32)
CREAT SERPL-MCNC: 0.7 MG/DL — SIGNIFICANT CHANGE UP (ref 0.7–1.5)
EGFR: 97 ML/MIN/1.73M2 — SIGNIFICANT CHANGE UP
EOSINOPHIL # BLD AUTO: 0.24 K/UL — SIGNIFICANT CHANGE UP (ref 0–0.7)
EOSINOPHIL NFR BLD AUTO: 2.7 % — SIGNIFICANT CHANGE UP (ref 0–8)
GLUCOSE BLDC GLUCOMTR-MCNC: 112 MG/DL — HIGH (ref 70–99)
GLUCOSE BLDC GLUCOMTR-MCNC: 124 MG/DL — HIGH (ref 70–99)
GLUCOSE BLDC GLUCOMTR-MCNC: 136 MG/DL — HIGH (ref 70–99)
GLUCOSE BLDC GLUCOMTR-MCNC: 158 MG/DL — HIGH (ref 70–99)
GLUCOSE SERPL-MCNC: 114 MG/DL — HIGH (ref 70–99)
HCT VFR BLD CALC: 39 % — SIGNIFICANT CHANGE UP (ref 37–47)
HGB BLD-MCNC: 12.7 G/DL — SIGNIFICANT CHANGE UP (ref 12–16)
LYMPHOCYTES # BLD AUTO: 2.51 K/UL — SIGNIFICANT CHANGE UP (ref 1.2–3.4)
LYMPHOCYTES # BLD AUTO: 27.7 % — SIGNIFICANT CHANGE UP (ref 20.5–51.1)
MAGNESIUM SERPL-MCNC: 2.1 MG/DL — SIGNIFICANT CHANGE UP (ref 1.8–2.4)
MCHC RBC-ENTMCNC: 32 PG — HIGH (ref 27–31)
MCHC RBC-ENTMCNC: 32.6 G/DL — SIGNIFICANT CHANGE UP (ref 32–37)
MCV RBC AUTO: 98.2 FL — SIGNIFICANT CHANGE UP (ref 81–99)
MONOCYTES # BLD AUTO: 0.81 K/UL — HIGH (ref 0.1–0.6)
MONOCYTES NFR BLD AUTO: 8.9 % — SIGNIFICANT CHANGE UP (ref 1.7–9.3)
NEUTROPHILS # BLD AUTO: 5.09 K/UL — SIGNIFICANT CHANGE UP (ref 1.4–6.5)
NEUTROPHILS NFR BLD AUTO: 56.2 % — SIGNIFICANT CHANGE UP (ref 42.2–75.2)
NRBC # BLD: SIGNIFICANT CHANGE UP /100 WBCS (ref 0–0)
PHOSPHATE SERPL-MCNC: 2.5 MG/DL — SIGNIFICANT CHANGE UP (ref 2.1–4.9)
PLATELET # BLD AUTO: 174 K/UL — SIGNIFICANT CHANGE UP (ref 130–400)
PMV BLD: 10.6 FL — HIGH (ref 7.4–10.4)
POTASSIUM SERPL-MCNC: 4.5 MMOL/L — SIGNIFICANT CHANGE UP (ref 3.5–5)
POTASSIUM SERPL-SCNC: 4.5 MMOL/L — SIGNIFICANT CHANGE UP (ref 3.5–5)
PROT SERPL-MCNC: 5.7 G/DL — LOW (ref 6–8)
RBC # BLD: 3.97 M/UL — LOW (ref 4.2–5.4)
RBC # FLD: 13.4 % — SIGNIFICANT CHANGE UP (ref 11.5–14.5)
SODIUM SERPL-SCNC: 140 MMOL/L — SIGNIFICANT CHANGE UP (ref 135–146)
WBC # BLD: 9.06 K/UL — SIGNIFICANT CHANGE UP (ref 4.8–10.8)
WBC # FLD AUTO: 9.06 K/UL — SIGNIFICANT CHANGE UP (ref 4.8–10.8)

## 2025-01-06 PROCEDURE — 99232 SBSQ HOSP IP/OBS MODERATE 35: CPT

## 2025-01-06 RX ORDER — APIXABAN 5 MG/1
5 TABLET, FILM COATED ORAL EVERY 12 HOURS
Refills: 0 | Status: DISCONTINUED | OUTPATIENT
Start: 2025-01-06 | End: 2025-01-06

## 2025-01-06 RX ORDER — ACETAMINOPHEN 80 MG/.8ML
2 SOLUTION/ DROPS ORAL
Qty: 0 | Refills: 0 | DISCHARGE
Start: 2025-01-06

## 2025-01-06 RX ORDER — CALCIUM CARBONATE 750 MG/1
1 TABLET, CHEWABLE ORAL
Qty: 0 | Refills: 0 | DISCHARGE
Start: 2025-01-06

## 2025-01-06 RX ORDER — ERGOCALCIFEROL 1.25 MG/1
1 CAPSULE ORAL
Qty: 4 | Refills: 0
Start: 2025-01-06 | End: 2025-02-04

## 2025-01-06 RX ORDER — OXYCODONE HCL 15 MG
1 TABLET ORAL
Qty: 0 | Refills: 0 | DISCHARGE
Start: 2025-01-06

## 2025-01-06 RX ORDER — POLYETHYLENE GLYCOL 3350 17 G/DOSE
17 POWDER (GRAM) ORAL
Qty: 0 | Refills: 0 | DISCHARGE
Start: 2025-01-06

## 2025-01-06 RX ORDER — APIXABAN 5 MG/1
5 TABLET, FILM COATED ORAL EVERY 12 HOURS
Refills: 0 | Status: DISCONTINUED | OUTPATIENT
Start: 2025-01-06 | End: 2025-01-07

## 2025-01-06 RX ORDER — LIDOCAINE 50 MG/G
1 OINTMENT TOPICAL
Qty: 0 | Refills: 0 | DISCHARGE
Start: 2025-01-06

## 2025-01-06 RX ORDER — INSULIN LISPRO 100/ML
1 VIAL (ML) SUBCUTANEOUS
Qty: 0 | Refills: 0 | DISCHARGE
Start: 2025-01-06

## 2025-01-06 RX ORDER — MORPHINE SULFATE 15 MG
2 TABLET, EXTENDED RELEASE ORAL
Qty: 10 | Refills: 0
Start: 2025-01-06 | End: 2025-01-15

## 2025-01-06 RX ADMIN — CALCIUM CARBONATE 1 TABLET(S): 750 TABLET, CHEWABLE ORAL at 05:15

## 2025-01-06 RX ADMIN — LIDOCAINE 1 PATCH: 50 OINTMENT TOPICAL at 11:20

## 2025-01-06 RX ADMIN — LIDOCAINE 1 PATCH: 50 OINTMENT TOPICAL at 22:39

## 2025-01-06 RX ADMIN — ACETAMINOPHEN 650 MILLIGRAM(S): 80 SOLUTION/ DROPS ORAL at 11:21

## 2025-01-06 RX ADMIN — LIDOCAINE 1 PATCH: 50 OINTMENT TOPICAL at 19:48

## 2025-01-06 RX ADMIN — ACETAMINOPHEN 650 MILLIGRAM(S): 80 SOLUTION/ DROPS ORAL at 12:47

## 2025-01-06 RX ADMIN — ACETAMINOPHEN 650 MILLIGRAM(S): 80 SOLUTION/ DROPS ORAL at 17:30

## 2025-01-06 RX ADMIN — LIDOCAINE 1 PATCH: 50 OINTMENT TOPICAL at 02:15

## 2025-01-06 RX ADMIN — ACETAMINOPHEN 650 MILLIGRAM(S): 80 SOLUTION/ DROPS ORAL at 18:07

## 2025-01-06 RX ADMIN — ACETAMINOPHEN 650 MILLIGRAM(S): 80 SOLUTION/ DROPS ORAL at 06:38

## 2025-01-06 RX ADMIN — ACETAMINOPHEN 650 MILLIGRAM(S): 80 SOLUTION/ DROPS ORAL at 05:15

## 2025-01-06 RX ADMIN — CALCIUM CARBONATE 1 TABLET(S): 750 TABLET, CHEWABLE ORAL at 21:17

## 2025-01-06 NOTE — OCCUPATIONAL THERAPY INITIAL EVALUATION ADULT - TRANSFER TRAINING, PT EVAL
Pt will perform sit<>stand transfer using appropriate AD with CGA by dc. Pt will perform bed<>Chair transfer using appropriate AD with CGA by dc.

## 2025-01-06 NOTE — DISCHARGE NOTE PROVIDER - CARE PROVIDER_API CALL
Chaparro Meng  Orthopaedic Surgery  [address] [place] [address]  Phone: [phone]  Fax: [phone]  Established Patient  Follow Up Time: 1 week    Cici Lawrence  Endocrinology/Metab/Diabetes  [address] [place] [address]  Phone: [phone]  Fax: [phone]  Follow Up Time: 2 weeks

## 2025-01-06 NOTE — DISCHARGE NOTE PROVIDER - HOSPITAL COURSE
64 y.o lady presenting for after a fall found to have acute, displaced right femoral neck fracture and acute, nondisplaced right anterior 6-7th rib fracture    #Fall, reportedly mechanical  #Acute, displaced right femoral neck fracture, sp R Hip hemiarthroplasty  #Acute, nondisplaced right anterior 6-7th rib fracture; no pneumothorax.  - ortho following  - OP f up with endocrinology to r/o osteoporosis (osteoporotic fracture)  - start calcium and vitamin D supplementation   - incentive neymar   - pain management:      * tylenol 650 mg [id number] standing     * lidocaine patch     * oxycodone IR 5 mg every 6 hours PRN for moderate pain     * morphine 2 mg IV every 6 hours PRN for severe pain  -Clindamycin q8hr x3 doses for a total of 24hrs post-operatively per ortho, completed  - Will resume Eliquis on discharge, pt was on Lovenox for DVT prophylaxis during this hospital visit    #CHB with narrow escape rhythm s/p DC PPM implant SJM  #A-fib on Eliquis  - ECG: V paced  - EP for device interrogation - short run of a fib 12/31l; device working properly. f/u OP    # Bilateral subsegmental atelectasis.  #Smoking with emphysematous changes   - seen on CT   - incentive neymar    #Smoking (nicotine)  - 1 pack per day   - offered nicotine patch -> didn't want it at this point    #DM   - sliding scale    Pt stable to be discharged. pt discharge discussed and approved by Dr. Crump. Pt will need follow up with Ortho, endo, EP and PCP.  Awaiting call back from acute rehab to confirm bed avail.   64 y.o lady presenting for after a fall found to have acute, displaced right femoral neck fracture and acute, nondisplaced right anterior 6-7th rib fracture    #Fall, reportedly mechanical  #Acute, displaced right femoral neck fracture, sp R Hip hemiarthroplasty  #Acute, nondisplaced right anterior 6-7th rib fracture; no pneumothorax.  - ortho following  - OP f up with endocrinology to r/o osteoporosis (osteoporotic fracture)  - start calcium and vitamin D supplementation   - incentive neymar   - pain management:      * tylenol 650 mg [id number] standing     * lidocaine patch     * oxycodone IR 5 mg every 6 hours PRN for moderate pain     * morphine 2 mg IV every 6 hours PRN for severe pain  -Clindamycin q8hr x3 doses for a total of 24hrs post-operatively per ortho, completed  - Will resume Eliquis on discharge, pt was on Lovenox for DVT prophylaxis during this hospital visit    #CHB with narrow escape rhythm s/p DC PPM implant SJM  #A-fib on Eliquis  - ECG: V paced  - EP for device interrogation - short run of a fib 12/31l; device working properly. f/u OP    # Bilateral subsegmental atelectasis.  #Smoking with emphysematous changes   - seen on CT   - incentive neymar    #Smoking (nicotine)  - 1 pack per day   - offered nicotine patch -> didn't want it at this point    #DM   - sliding scale    Pt stable to be discharged. pt discharge discussed and approved by Dr. Crump. Pt will need follow up with Ortho, endo, EP and PCP.  Patient is stable for discharge to inpatient rehab. 64 y.o lady presenting for after a fall found to have acute, displaced right femoral neck fracture and acute, nondisplaced right anterior 6-7th rib fracture    #Fall, reportedly mechanical  #Acute, displaced right femoral neck fracture, sp R Hip hemiarthroplasty  #Acute, nondisplaced right anterior 6-7th rib fracture; no pneumothorax.  - ortho following  - OP f up with endocrinology to r/o osteoporosis (osteoporotic fracture)  - start calcium and vitamin D supplementation   - incentive neymar   - pain management:      * tylenol 650 mg [id number] standing     * lidocaine patch     * oxycodone IR 5 mg every 6 hours PRN for moderate pain     * morphine 2 mg IV every 6 hours PRN for severe pain  -Clindamycin q8hr x3 doses for a total of 24hrs post-operatively per ortho, completed  - Will resume Eliquis on discharge, pt was on Lovenox for DVT prophylaxis during this hospital visit    #CHB with narrow escape rhythm s/p DC PPM implant SJM  #A-fib on Eliquis  - ECG: V paced  - EP for device interrogation - short run of a fib 12/31l; device working properly. f/u OP    # Bilateral subsegmental atelectasis.  #Smoking with emphysematous changes   - seen on CT   - incentive neymar    #Smoking (nicotine)  - 1 pack per day   - offered nicotine patch -> didn't want it at this point    #DM   - sliding scale    #HTN  -restarted amlodipine     Pt stable to be discharged. pt discharge discussed and approved by Dr. Crump. Pt will need follow up with Ortho, endo, EP and PCP.  Patient is stable for discharge to inpatient rehab. 64 y.o lady presenting for after a fall found to have acute, displaced right femoral neck fracture and acute, nondisplaced right anterior 6-7th rib fracture    #Fall, reportedly mechanical  #Acute, displaced right femoral neck fracture, sp R Hip hemiarthroplasty  #Acute, nondisplaced right anterior 6-7th rib fracture; no pneumothorax.  - ortho following  - OP f up with endocrinology to r/o osteoporosis (osteoporotic fracture)  - start calcium and vitamin D supplementation   - incentive neymar   - pain management:      * tylenol 650 mg [id number] standing     * lidocaine patch     * oxycodone IR 5 mg every 6 hours PRN for moderate pain     * morphine 2 mg IV every 6 hours PRN for severe pain  -Clindamycin q8hr x3 doses for a total of 24hrs post-operatively per ortho, completed  - Will resume Eliquis on discharge, pt was on Lovenox for DVT prophylaxis during this hospital visit    #CHB with narrow escape rhythm s/p DC PPM implant SJM  #A-fib on Eliquis  - ECG: V paced  - EP for device interrogation - short run of a fib 12/31l; device working properly. f/u OP    # Bilateral subsegmental atelectasis.  #Smoking with emphysematous changes   - seen on CT   - incentive neymar    #Smoking (nicotine)  - 1 pack per day   - offered nicotine patch -> didn't want it at this point    #DM   - sliding scale    #HTN  -restarted coreg    Pt stable to be discharged. pt discharge discussed and approved by Dr. Crump. Pt will need follow up with Ortho, endo, EP and PCP.  Patient is stable for discharge to inpatient rehab.

## 2025-01-06 NOTE — OCCUPATIONAL THERAPY INITIAL EVALUATION ADULT - ADL RETRAINING, OT EVAL
Pt will perform UB dressing task with supervision by dc. Pt will perform LB dressing task using AEs with Luna by dc.

## 2025-01-06 NOTE — PROGRESS NOTE ADULT - SUBJECTIVE AND OBJECTIVE BOX
Orthopaedic Surgery Progress Note    S&E at bedside this AM. Pain well controlled. Denies fever/chills/CP/SOB. No other complaints    P/E:  Alert, NAD  Nonlabored breathing    RLE  Dressing c/d/i  SILT s/s/sp/dp/t  Motor intact TA/EHL/FHL/GS  Digits wwp  Labs      A/P:   64yFemale POD3 s/p R hip hemiarthroplasty on 1/3, doing well.    Recommend obtaining urinalysis post op. Patient was febrile at time of induction of anesthesia     - Activity: WBAT RLE  - Abx: Clindamycin q8hr x3 doses for a total of 24hrs post-operatively - COMPLETED   - DVT PPx: Lovenox  - Pain control  - IS encouraged  - AM labs  - PT/Rehab  - D/C planning    - If discharged, patient should follow up with Dr. Meng at [address] [facility] [address] phone [phone].  - Patient should be discharged on appropriate DVT prophylaxis for 6 weeks (from surgery date). Orthopaedic preference is Aspirin 81mg twice daily for DVT prophylaxis unless contraindication or all ready on home anticoagulation for which they may continue instead as their mobility/function/ambulation will be decreased due to lower extremity orthopaedic surgery.

## 2025-01-06 NOTE — DISCHARGE NOTE PROVIDER - NSDCFUSCHEDAPPT_GEN_ALL_CORE_FT
Hans Byrnes  [facility]  ELECTROPH [address]  Scheduled Appointment: 02/05/2025    [facility]  CARDIOLOGY [address]  Scheduled Appointment: 03/19/2025

## 2025-01-06 NOTE — OCCUPATIONAL THERAPY INITIAL EVALUATION ADULT - LIVES WITH, PROFILE
Pt lives in an elevator apt on 6th floor with elevator with ~1 step to enter with HRs. +bath tub with no grab bars and no shower chair/alone

## 2025-01-06 NOTE — PROGRESS NOTE ADULT - SUBJECTIVE AND OBJECTIVE BOX
Patient is a 64y old  Female who presents with a chief complaint of   INTERVAL HPI/OVERNIGHT EVENTS: Patient was examined and seen at bedside. This morning pt is resting comfortably in bed and reports no new issues or overnight events. No complaints, feels ok. Pain is controlled.  ROS: Denies CP, SOB, AP, new weakness  All other systems reviewed and are within normal limits.  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   Patient is coming by her , states that she came to pick her up for a PMD visit and found her outside of her apartment laying on the ground for an unknown period of time.    Patient states that she tripped and fell landing on her right hip which is causing her significant pain.    She denies LOC, denies head strike.      Of note, patient had a fall 1 week ago, also ground-level fall but eloped prior to receiving any medical treatment.  Back then she reports that she had lost control of both of her legs which caused her fall.      Today, she denies shortness of breath, chest pain, abdominal pain, nausea, vomiting.    Denies recent fever, chills    Patient is not presenting from [facility]; patient denied it and I called the [facility] and they confirmed that she is not currently admitted there.     In the ED   Vitals   · BP Systolic	119 mm Hg  · BP Diastolic	77 mm Hg  · Heart Rate	 104 /min  · Respiration Rate (breaths/min)	18 /min  · Temp (C)	37 Degrees C  · SpO2 (%)	94 % room air    CT HEAD:  No acute intracranial findings.    Stable mild-moderate chronic microvascular ischemic changes.    CT CERVICAL SPINE:  No acute cervical fracture or facet subluxation.      CT abdomen and pelvis w IV contrast:  Acute, displaced right femoral neck fracture.  Acute, nondisplaced right anterior 6-7th rib fracture; no pneumothorax.    Xray tibia fibula: TIBIA/FIBULA: No acute displaced fracture. Osteopenia.    TTE 2023:   1. Left ventricular ejection fraction, by visual estimation, is 65 to 70%.   2. Normal global left ventricular systolic function.   3. The left ventricular diastolic function could not be assessed in this study.   4. Mildly enlarged left atrium.   5. Mild-to-moderate mitral regurgitation.   6. Mild tricuspid regurgitation.   (02 Jan 2025 20:34)    PAST MEDICAL & SURGICAL HISTORY:  Diabetes mellitus      Bipolar disorder in partial remission, most recent episode unspecified type  Last episodes six years ago      History of hysterectomy for malignancy          General: NAD, AAO3  HEENT:  EOMI, no LAD  CV: S1 S2  Resp: decreased breath sounds at bases  GI: NT/ND/S +BS  MS: no clubbing/cyanosis/edema, + pulses b/l  Neuro: limited ROM RLE due to pain. C/d/i dsg          Home Medications:  acetaminophen 325 mg oral tablet: 2 tab(s) orally every 6 hours (06 Jan 2025 11:27)  calcium carbonate 1250 mg (500 mg elemental calcium) oral tablet: 1 tab(s) orally 2 times a day (06 Jan 2025 11:27)  Eliquis 5 mg oral tablet: 1 tab(s) orally 2 times a day (06 Jan 2025 11:27)  ezetimibe 10 mg oral tablet: 1 tab(s) orally once a day (06 Jan 2025 11:27)  insulin lispro 100 units/mL injectable solution: 1 unit(s) injectable 3 times a day (with meals) as needed for  hyperglycemia 2 Unit(s) if Glucose 151 - 200  4 Unit(s) if Glucose 201 - 250  6 Unit(s) if Glucose 251 - 300  8 Unit(s) if Glucose 301 - 350  10 Unit(s) if Glucose 351 - 400  12 Unit(s) if Glucose Greater Than 400 (06 Jan 2025 15:01)  melatonin 10 mg oral capsule: 1 cap(s) orally once a day (at bedtime) (06 Jan 2025 11:26)  oxyCODONE 5 mg oral tablet: 1 tab(s) orally every 6 hours As needed Moderate Pain (4 - 6) (06 Jan 2025 11:27)  polyethylene glycol 3350 oral powder for reconstitution: 17 gram(s) orally 2 times a day (06 Jan 2025 11:27)  Prolixin Decanoate 25 mg/mL injectable solution: 125 milligram(s) intramuscularly every 2 weeks (06 Jan 2025 11:02)    MEDICATIONS  (STANDING):  acetaminophen     Tablet .. 650 milliGRAM(s) Oral every 6 hours  apixaban 5 milliGRAM(s) Oral every 12 hours  aspirin  chewable 81 milliGRAM(s) Oral two times a day  calcium carbonate   1250 mG (OsCal) 1 Tablet(s) Oral two times a day  dextrose 5%. 1000 milliLiter(s) (50 mL/Hr) IV Continuous <Continuous>  dextrose 5%. 1000 milliLiter(s) (100 mL/Hr) IV Continuous <Continuous>  dextrose 50% Injectable 25 Gram(s) IV Push once  dextrose 50% Injectable 12.5 Gram(s) IV Push once  dextrose 50% Injectable 25 Gram(s) IV Push once  ergocalciferol 57131 Unit(s) Oral every week  glucagon  Injectable 1 milliGRAM(s) IntraMuscular once  influenza   Vaccine 0.5 milliLiter(s) IntraMuscular once  insulin lispro (ADMELOG) corrective regimen sliding scale   SubCutaneous three times a day before meals  lidocaine   4% Patch 1 Patch Transdermal daily  polyethylene glycol 3350 17 Gram(s) Oral two times a day    MEDICATIONS  (PRN):  dextrose Oral Gel 15 Gram(s) Oral once PRN Blood Glucose LESS THAN 70 milliGRAM(s)/deciliter  morphine  - Injectable 2 milliGRAM(s) IV Push every 6 hours PRN Severe Pain (7 - 10)  oxyCODONE    IR 5 milliGRAM(s) Oral every 6 hours PRN Moderate Pain (4 - 6)    Vital Signs Last 24 Hrs  T(C): 36.4 (06 Jan 2025 13:00), Max: 36.8 (06 Jan 2025 05:30)  T(F): 97.5 (06 Jan 2025 13:00), Max: 98.3 (06 Jan 2025 05:30)  HR: 105 (06 Jan 2025 13:00) (79 - 105)  BP: 105/67 (06 Jan 2025 13:00) (105/67 - 158/74)  BP(mean): 79 (06 Jan 2025 13:00) (79 - 102)  RR: 18 (06 Jan 2025 13:00) (18 - 18)  SpO2: 95% (06 Jan 2025 13:00) (92% - 95%)    Parameters below as of 06 Jan 2025 13:00  Patient On (Oxygen Delivery Method): room air      CAPILLARY BLOOD GLUCOSE      POCT Blood Glucose.: 158 mg/dL (06 Jan 2025 11:30)  POCT Blood Glucose.: 124 mg/dL (06 Jan 2025 07:40)  POCT Blood Glucose.: 174 mg/dL (05 Jan 2025 20:53)  POCT Blood Glucose.: 174 mg/dL (05 Jan 2025 16:52)    LABS:                        12.7   9.06  )-----------( 174      ( 06 Jan 2025 05:30 )             39.0     01-06    140  |  104  |  14  ----------------------------<  114[H]  4.5   |  26  |  0.7    Ca    8.0[L]      06 Jan 2025 05:30  Phos  2.5     01-06  Mg     2.1     01-06    TPro  5.7[L]  /  Alb  3.1[L]  /  TBili  0.3  /  DBili  x   /  AST  29  /  ALT  14  /  AlkPhos  55  01-06    LIVER FUNCTIONS - ( 06 Jan 2025 05:30 )  Alb: 3.1 g/dL / Pro: 5.7 g/dL / ALK PHOS: 55 U/L / ALT: 14 U/L / AST: 29 U/L / GGT: x                 Urinalysis Basic - ( 06 Jan 2025 05:30 )    Color: x / Appearance: x / SG: x / pH: x  Gluc: 114 mg/dL / Ketone: x  / Bili: x / Urobili: x   Blood: x / Protein: x / Nitrite: x   Leuk Esterase: x / RBC: x / WBC x   Sq Epi: x / Non Sq Epi: x / Bacteria: x              Consultant Notes Reviewed:  [x ] YES  [ ] NO  Care Discussed with Consultants/Other Providers/ Housestaff [ x] YES  [ ] NO  Radiology, labs, EKGs, new studies personally reviewed.

## 2025-01-06 NOTE — DISCHARGE NOTE PROVIDER - CARE PROVIDERS DIRECT ADDRESSES
,DirectAddress_Unknown,[email or web address]

## 2025-01-06 NOTE — OCCUPATIONAL THERAPY INITIAL EVALUATION ADULT - GENERAL OBSERVATIONS, REHAB EVAL
Pt encountered semi gardner in bed in NAD +IV locked. Pt agreed to OT eval with pain reported on right hip surgery site 5/10 when standing/weight bearing. BP taken 105/67 supine in bed initially, seated at eob 150/73, and post supine back to bed 132/73. Pt left semi gardner in bed with pain reduced to 3/10, refused to sit at b/s chair at this time despite encouragement, RN Em wihte.

## 2025-01-06 NOTE — OCCUPATIONAL THERAPY INITIAL EVALUATION ADULT - RANGE OF MOTION EXAMINATION, UPPER EXTREMITY
except b shoulders AROM ~3/4 range and AAROM WFL/bilateral UE Active ROM was WFL  (within functional limits)

## 2025-01-06 NOTE — DISCHARGE NOTE PROVIDER - NSDCMRMEDTOKEN_GEN_ALL_CORE_FT
acetaminophen 325 mg oral tablet: 2 tab(s) orally every 6 hours  amLODIPine 10 mg oral tablet: 1 tab(s) orally once a day  atorvastatin 80 mg oral tablet: 1 tab(s) orally once a day (at bedtime)  calcium carbonate 1250 mg (500 mg elemental calcium) oral tablet: 1 tab(s) orally 2 times a day  Coreg 3.125 mg oral tablet: 1 tab(s) orally every 12 hours  Eliquis 5 mg oral tablet: 1 tab(s) orally 2 times a day  ergocalciferol 1.25 mg (50,000 intl units) oral capsule: 1 cap(s) orally once a week  ezetimibe 10 mg oral tablet: 1 tab(s) orally once a day  melatonin 10 mg oral capsule: 1 cap(s) orally once a day (at bedtime)  oxyCODONE 5 mg oral tablet: 1 tab(s) orally every 6 hours As needed Moderate Pain (4 - 6)  pioglitazone 30 mg oral tablet: 1 tab(s) orally once a day (in the morning)  polyethylene glycol 3350 oral powder for reconstitution: 17 gram(s) orally 2 times a day  Prolixin Decanoate 25 mg/mL injectable solution: 125 milligram(s) intramuscularly every 2 weeks   acetaminophen 325 mg oral tablet: 2 tab(s) orally every 6 hours  amLODIPine 10 mg oral tablet: 1 tab(s) orally once a day  atorvastatin 80 mg oral tablet: 1 tab(s) orally once a day (at bedtime)  calcium carbonate 1250 mg (500 mg elemental calcium) oral tablet: 1 tab(s) orally 2 times a day  Eliquis 5 mg oral tablet: 1 tab(s) orally 2 times a day  ergocalciferol 1.25 mg (50,000 intl units) oral capsule: 1 cap(s) orally once a week  ezetimibe 10 mg oral tablet: 1 tab(s) orally once a day  melatonin 10 mg oral capsule: 1 cap(s) orally once a day (at bedtime)  morphine 2 mg/mL preservative-free injectable solution: 2 milligram(s) intravenously every 6 hours MDD: 8 mg  oxyCODONE 5 mg oral tablet: 1 tab(s) orally every 6 hours As needed Moderate Pain (4 - 6)  pioglitazone 30 mg oral tablet: 1 tab(s) orally once a day (in the morning)  polyethylene glycol 3350 oral powder for reconstitution: 17 gram(s) orally 2 times a day  Prolixin Decanoate 25 mg/mL injectable solution: 125 milligram(s) intramuscularly every 2 weeks   acetaminophen 325 mg oral tablet: 2 tab(s) orally every 6 hours  amLODIPine 10 mg oral tablet: 1 tab(s) orally once a day  atorvastatin 80 mg oral tablet: 1 tab(s) orally once a day (at bedtime)  calcium carbonate 1250 mg (500 mg elemental calcium) oral tablet: 1 tab(s) orally 2 times a day  Eliquis 5 mg oral tablet: 1 tab(s) orally 2 times a day  ergocalciferol 1.25 mg (50,000 intl units) oral capsule: 1 cap(s) orally once a week  ezetimibe 10 mg oral tablet: 1 tab(s) orally once a day  insulin lispro 100 units/mL injectable solution: 1 unit(s) injectable 3 times a day (with meals) as needed for  hyperglycemia 2 Unit(s) if Glucose 151 - 200  4 Unit(s) if Glucose 201 - 250  6 Unit(s) if Glucose 251 - 300  8 Unit(s) if Glucose 301 - 350  10 Unit(s) if Glucose 351 - 400  12 Unit(s) if Glucose Greater Than 400  melatonin 10 mg oral capsule: 1 cap(s) orally once a day (at bedtime)  oxyCODONE 5 mg oral tablet: 1 tab(s) orally every 6 hours As needed Moderate Pain (4 - 6)  polyethylene glycol 3350 oral powder for reconstitution: 17 gram(s) orally 2 times a day  Prolixin Decanoate 25 mg/mL injectable solution: 125 milligram(s) intramuscularly every 2 weeks   acetaminophen 325 mg oral tablet: 2 tab(s) orally every 6 hours  atorvastatin 80 mg oral tablet: 1 tab(s) orally once a day (at bedtime)  calcium carbonate 1250 mg (500 mg elemental calcium) oral tablet: 1 tab(s) orally 2 times a day  Coreg 3.125 mg oral tablet: 1 tab(s) orally 2 times a day  Eliquis 5 mg oral tablet: 1 tab(s) orally 2 times a day  ergocalciferol 1.25 mg (50,000 intl units) oral capsule: 1 cap(s) orally once a week  ezetimibe 10 mg oral tablet: 1 tab(s) orally once a day  furosemide 40 mg oral tablet: 1 tab(s) orally once a day as needed for fluid overload  insulin lispro 100 units/mL injectable solution: 1 unit(s) injectable 3 times a day (with meals) as needed for  hyperglycemia 2 Unit(s) if Glucose 151 - 200  4 Unit(s) if Glucose 201 - 250  6 Unit(s) if Glucose 251 - 300  8 Unit(s) if Glucose 301 - 350  10 Unit(s) if Glucose 351 - 400  12 Unit(s) if Glucose Greater Than 400  melatonin 10 mg oral capsule: 1 cap(s) orally once a day (at bedtime)  oxyCODONE 5 mg oral tablet: 1 tab(s) orally every 6 hours As needed Moderate Pain (4 - 6)  polyethylene glycol 3350 oral powder for reconstitution: 17 gram(s) orally 2 times a day  Prolixin Decanoate 25 mg/mL injectable solution: 125 milligram(s) intramuscularly every 2 weeks   acetaminophen 325 mg oral tablet: 2 tab(s) orally every 6 hours  amLODIPine 5 mg oral tablet: 2 tab(s) orally once a day  atorvastatin 80 mg oral tablet: 1 tab(s) orally once a day (at bedtime)  calcium carbonate 1250 mg (500 mg elemental calcium) oral tablet: 1 tab(s) orally 2 times a day  Coreg 3.125 mg oral tablet: 1 tab(s) orally 2 times a day  Eliquis 5 mg oral tablet: 1 tab(s) orally 2 times a day  ergocalciferol 1.25 mg (50,000 intl units) oral capsule: 1 cap(s) orally once a week  ezetimibe 10 mg oral tablet: 1 tab(s) orally once a day  furosemide 40 mg oral tablet: 1 tab(s) orally once a day as needed for fluid overload  insulin lispro 100 units/mL injectable solution: 1 unit(s) injectable 3 times a day (with meals) as needed for  hyperglycemia 2 Unit(s) if Glucose 151 - 200  4 Unit(s) if Glucose 201 - 250  6 Unit(s) if Glucose 251 - 300  8 Unit(s) if Glucose 301 - 350  10 Unit(s) if Glucose 351 - 400  12 Unit(s) if Glucose Greater Than 400  melatonin 10 mg oral capsule: 1 cap(s) orally once a day (at bedtime)  oxyCODONE 5 mg oral tablet: 1 tab(s) orally every 6 hours As needed Moderate Pain (4 - 6)  polyethylene glycol 3350 oral powder for reconstitution: 17 gram(s) orally 2 times a day  Prolixin Decanoate 25 mg/mL injectable solution: 125 milligram(s) intramuscularly every 2 weeks

## 2025-01-06 NOTE — DISCHARGE NOTE PROVIDER - PROVIDER TOKENS
PROVIDER:[TOKEN:[[id number]],FOLLOWUP:[1 week],ESTABLISHEDPATIENT:[T]],PROVIDER:[TOKEN:[[id number]],FOLLOWUP:[2 weeks]]

## 2025-01-06 NOTE — OCCUPATIONAL THERAPY INITIAL EVALUATION ADULT - PERTINENT HX OF CURRENT PROBLEM, REHAB EVAL
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   Patient is coming by her , states that she came to pick her up for a PMD visit and found her outside of her apartment laying on the ground for an unknown period of time.    Patient states that she tripped and fell landing on her right hip which is causing her significant pain.

## 2025-01-06 NOTE — DISCHARGE NOTE PROVIDER - NSDCCPCAREPLAN_GEN_ALL_CORE_FT
PRINCIPAL DISCHARGE DIAGNOSIS  Diagnosis: Closed right hip fracture  Assessment and Plan of Treatment: You presented after a mechanical fall, sustaining a displaced right femoral neck fracture (treated with hemiarthroplasty) and a non-displaced right anterior 6-7th rib fracture.  You will require outpatient follow-up with endocrinology to evaluate for osteoporosis given the nature of your fracture.  You will be discharged on calcium and vitamin D supplementation, pain medication, and will follow up with orthopedics, endocrinology, electrophysiology, and PCP.  Placement in acute rehab is pending. Please take your medications as listed on your discharge med rec.        SECONDARY DISCHARGE DIAGNOSES  Diagnosis: Rib fracture  Assessment and Plan of Treatment:      PRINCIPAL DISCHARGE DIAGNOSIS  Diagnosis: Closed right hip fracture  Assessment and Plan of Treatment: You presented after a mechanical fall, sustaining a displaced right femoral neck fracture (treated with hemiarthroplasty) and a non-displaced right anterior 6-7th rib fracture.  You will require outpatient follow-up with endocrinology to evaluate for osteoporosis given the nature of your fracture.  You will be discharged on calcium and vitamin D supplementation, pain medication, and will follow up with orthopedics, endocrinology, electrophysiology, and PCP. You are stable for discharge to inpatient rehab.      SECONDARY DISCHARGE DIAGNOSES  Diagnosis: Rib fracture  Assessment and Plan of Treatment:

## 2025-01-07 ENCOUNTER — INPATIENT (INPATIENT)
Facility: HOSPITAL | Age: 65
LOS: 8 days | Discharge: NURSING, REHAB OR LONG-TERM CARE | DRG: 914 | End: 2025-01-16
Attending: PHYSICAL MEDICINE & REHABILITATION | Admitting: PHYSICAL MEDICINE & REHABILITATION
Payer: MEDICARE

## 2025-01-07 ENCOUNTER — TRANSCRIPTION ENCOUNTER (OUTPATIENT)
Age: 65
End: 2025-01-07

## 2025-01-07 VITALS — HEART RATE: 72 BPM | SYSTOLIC BLOOD PRESSURE: 164 MMHG | DIASTOLIC BLOOD PRESSURE: 70 MMHG

## 2025-01-07 VITALS
OXYGEN SATURATION: 95 % | RESPIRATION RATE: 18 BRPM | DIASTOLIC BLOOD PRESSURE: 74 MMHG | SYSTOLIC BLOOD PRESSURE: 163 MMHG | TEMPERATURE: 98 F | HEART RATE: 79 BPM

## 2025-01-07 DIAGNOSIS — Z90.710 ACQUIRED ABSENCE OF BOTH CERVIX AND UTERUS: Chronic | ICD-10-CM

## 2025-01-07 DIAGNOSIS — T07.XXXA UNSPECIFIED MULTIPLE INJURIES, INITIAL ENCOUNTER: ICD-10-CM

## 2025-01-07 LAB
GLUCOSE BLDC GLUCOMTR-MCNC: 125 MG/DL — HIGH (ref 70–99)
GLUCOSE BLDC GLUCOMTR-MCNC: 137 MG/DL — HIGH (ref 70–99)
GLUCOSE BLDC GLUCOMTR-MCNC: 150 MG/DL — HIGH (ref 70–99)
GLUCOSE BLDC GLUCOMTR-MCNC: 180 MG/DL — HIGH (ref 70–99)

## 2025-01-07 PROCEDURE — 97116 GAIT TRAINING THERAPY: CPT | Mod: GP

## 2025-01-07 PROCEDURE — 93280 PM DEVICE PROGR EVAL DUAL: CPT

## 2025-01-07 PROCEDURE — 83735 ASSAY OF MAGNESIUM: CPT

## 2025-01-07 PROCEDURE — 99239 HOSP IP/OBS DSCHRG MGMT >30: CPT

## 2025-01-07 PROCEDURE — 97162 PT EVAL MOD COMPLEX 30 MIN: CPT | Mod: GP

## 2025-01-07 PROCEDURE — 93005 ELECTROCARDIOGRAM TRACING: CPT

## 2025-01-07 PROCEDURE — 97166 OT EVAL MOD COMPLEX 45 MIN: CPT | Mod: GO

## 2025-01-07 PROCEDURE — 82962 GLUCOSE BLOOD TEST: CPT

## 2025-01-07 PROCEDURE — 92526 ORAL FUNCTION THERAPY: CPT | Mod: GN

## 2025-01-07 PROCEDURE — 36415 COLL VENOUS BLD VENIPUNCTURE: CPT

## 2025-01-07 PROCEDURE — 85025 COMPLETE CBC W/AUTO DIFF WBC: CPT

## 2025-01-07 PROCEDURE — 97535 SELF CARE MNGMENT TRAINING: CPT | Mod: GO

## 2025-01-07 PROCEDURE — 80053 COMPREHEN METABOLIC PANEL: CPT

## 2025-01-07 PROCEDURE — 97110 THERAPEUTIC EXERCISES: CPT | Mod: GO

## 2025-01-07 PROCEDURE — 97530 THERAPEUTIC ACTIVITIES: CPT | Mod: GO

## 2025-01-07 RX ORDER — GLUCAGON INJECTION, SOLUTION 0.5 MG/.1ML
1 INJECTION, SOLUTION SUBCUTANEOUS ONCE
Refills: 0 | Status: DISCONTINUED | OUTPATIENT
Start: 2025-01-07 | End: 2025-01-16

## 2025-01-07 RX ORDER — ATORVASTATIN CALCIUM 40 MG/1
80 TABLET, FILM COATED ORAL AT BEDTIME
Refills: 0 | Status: DISCONTINUED | OUTPATIENT
Start: 2025-01-07 | End: 2025-01-16

## 2025-01-07 RX ORDER — SENNOSIDES 8.6 MG/1
2 TABLET, FILM COATED ORAL AT BEDTIME
Refills: 0 | Status: DISCONTINUED | OUTPATIENT
Start: 2025-01-07 | End: 2025-01-09

## 2025-01-07 RX ORDER — GINKGO BILOBA 40 MG
10 CAPSULE ORAL AT BEDTIME
Refills: 0 | Status: DISCONTINUED | OUTPATIENT
Start: 2025-01-07 | End: 2025-01-16

## 2025-01-07 RX ORDER — SODIUM CHLORIDE 9 MG/ML
1000 INJECTION, SOLUTION INTRAVENOUS
Refills: 0 | Status: DISCONTINUED | OUTPATIENT
Start: 2025-01-07 | End: 2025-01-16

## 2025-01-07 RX ORDER — FUROSEMIDE 20 MG
1 TABLET ORAL
Qty: 0 | Refills: 0 | DISCHARGE
Start: 2025-01-07

## 2025-01-07 RX ORDER — POLYETHYLENE GLYCOL 3350 17 G/DOSE
17 POWDER (GRAM) ORAL
Refills: 0 | Status: DISCONTINUED | OUTPATIENT
Start: 2025-01-07 | End: 2025-01-16

## 2025-01-07 RX ORDER — DEXTROSE MONOHYDRATE 25 G/50ML
12.5 INJECTION, SOLUTION INTRAVENOUS ONCE
Refills: 0 | Status: DISCONTINUED | OUTPATIENT
Start: 2025-01-07 | End: 2025-01-16

## 2025-01-07 RX ORDER — EZETIMIBE 10 MG/1
10 TABLET ORAL DAILY
Refills: 0 | Status: DISCONTINUED | OUTPATIENT
Start: 2025-01-07 | End: 2025-01-16

## 2025-01-07 RX ORDER — CHLORHEXIDINE GLUCONATE 1.2 MG/ML
1 RINSE ORAL
Refills: 0 | Status: DISCONTINUED | OUTPATIENT
Start: 2025-01-07 | End: 2025-01-16

## 2025-01-07 RX ORDER — DEXTROSE MONOHYDRATE 25 G/50ML
25 INJECTION, SOLUTION INTRAVENOUS ONCE
Refills: 0 | Status: DISCONTINUED | OUTPATIENT
Start: 2025-01-07 | End: 2025-01-16

## 2025-01-07 RX ORDER — INSULIN LISPRO 100/ML
VIAL (ML) SUBCUTANEOUS
Refills: 0 | Status: DISCONTINUED | OUTPATIENT
Start: 2025-01-07 | End: 2025-01-08

## 2025-01-07 RX ORDER — OXYCODONE HCL 15 MG
5 TABLET ORAL EVERY 6 HOURS
Refills: 0 | Status: DISCONTINUED | OUTPATIENT
Start: 2025-01-07 | End: 2025-01-07

## 2025-01-07 RX ORDER — APIXABAN 5 MG/1
5 TABLET, FILM COATED ORAL
Refills: 0 | Status: DISCONTINUED | OUTPATIENT
Start: 2025-01-07 | End: 2025-01-16

## 2025-01-07 RX ORDER — CALCIUM CARBONATE 750 MG/1
1 TABLET, CHEWABLE ORAL
Refills: 0 | Status: DISCONTINUED | OUTPATIENT
Start: 2025-01-07 | End: 2025-01-16

## 2025-01-07 RX ORDER — ERGOCALCIFEROL 1.25 MG/1
50000 CAPSULE ORAL
Refills: 0 | Status: DISCONTINUED | OUTPATIENT
Start: 2025-01-10 | End: 2025-01-16

## 2025-01-07 RX ORDER — ACETAMINOPHEN 80 MG/.8ML
650 SOLUTION/ DROPS ORAL EVERY 6 HOURS
Refills: 0 | Status: DISCONTINUED | OUTPATIENT
Start: 2025-01-07 | End: 2025-01-16

## 2025-01-07 RX ORDER — DEXTROSE MONOHYDRATE 25 G/50ML
15 INJECTION, SOLUTION INTRAVENOUS ONCE
Refills: 0 | Status: DISCONTINUED | OUTPATIENT
Start: 2025-01-07 | End: 2025-01-16

## 2025-01-07 RX ORDER — CARVEDILOL 25 MG/1
3.12 TABLET, FILM COATED ORAL EVERY 12 HOURS
Refills: 0 | Status: DISCONTINUED | OUTPATIENT
Start: 2025-01-07 | End: 2025-01-16

## 2025-01-07 RX ORDER — FUROSEMIDE 20 MG
40 TABLET ORAL DAILY
Refills: 0 | Status: DISCONTINUED | OUTPATIENT
Start: 2025-01-07 | End: 2025-01-16

## 2025-01-07 RX ADMIN — ACETAMINOPHEN 650 MILLIGRAM(S): 80 SOLUTION/ DROPS ORAL at 11:37

## 2025-01-07 RX ADMIN — CALCIUM CARBONATE 1 TABLET(S): 750 TABLET, CHEWABLE ORAL at 05:02

## 2025-01-07 RX ADMIN — CALCIUM CARBONATE 1 TABLET(S): 750 TABLET, CHEWABLE ORAL at 17:50

## 2025-01-07 RX ADMIN — APIXABAN 5 MILLIGRAM(S): 5 TABLET, FILM COATED ORAL at 05:02

## 2025-01-07 RX ADMIN — ACETAMINOPHEN 650 MILLIGRAM(S): 80 SOLUTION/ DROPS ORAL at 13:21

## 2025-01-07 RX ADMIN — ACETAMINOPHEN 650 MILLIGRAM(S): 80 SOLUTION/ DROPS ORAL at 17:50

## 2025-01-07 RX ADMIN — LIDOCAINE 1 PATCH: 50 OINTMENT TOPICAL at 11:37

## 2025-01-07 RX ADMIN — ACETAMINOPHEN 650 MILLIGRAM(S): 80 SOLUTION/ DROPS ORAL at 22:30

## 2025-01-07 RX ADMIN — Medication 17 GRAM(S): at 05:03

## 2025-01-07 RX ADMIN — ACETAMINOPHEN 650 MILLIGRAM(S): 80 SOLUTION/ DROPS ORAL at 18:36

## 2025-01-07 RX ADMIN — Medication 5 MILLIGRAM(S): at 14:06

## 2025-01-07 RX ADMIN — ACETAMINOPHEN 650 MILLIGRAM(S): 80 SOLUTION/ DROPS ORAL at 21:26

## 2025-01-07 RX ADMIN — ACETAMINOPHEN 650 MILLIGRAM(S): 80 SOLUTION/ DROPS ORAL at 05:02

## 2025-01-07 RX ADMIN — Medication 81 MILLIGRAM(S): at 05:03

## 2025-01-07 NOTE — DISCHARGE NOTE NURSING/CASE MANAGEMENT/SOCIAL WORK - FINANCIAL ASSISTANCE
[facility] provides services at a reduced cost to those who are determined to be eligible through [facility]’s financial assistance program. Information regarding [facility]’s financial assistance program can be found by going to [email or web address] or by calling [phone].

## 2025-01-07 NOTE — H&P ADULT - NSHPLABSRESULTS_GEN_ALL_CORE
12.7   9.06  )-----------( 174      ( 06 Jan 2025 05:30 )             39.0     01-06    140  |  104  |  14  ----------------------------<  114[H]  4.5   |  26  |  0.7    Ca    8.0[L]      06 Jan 2025 05:30  Phos  2.5     01-06  Mg     2.1     01-06    TPro  5.7[L]  /  Alb  3.1[L]  /  TBili  0.3  /  DBili  x   /  AST  29  /  ALT  14  /  AlkPhos  55  01-06      Urinalysis Basic - ( 06 Jan 2025 05:30 )    Color: x / Appearance: x / SG: x / pH: x  Gluc: 114 mg/dL / Ketone: x  / Bili: x / Urobili: x   Blood: x / Protein: x / Nitrite: x   Leuk Esterase: x / RBC: x / WBC x   Sq Epi: x / Non Sq Epi: x / Bacteria: x      POCT Blood Glucose.: 150 mg/dL (01-07-25 @ 11:24)  POCT Blood Glucose.: 137 mg/dL (01-07-25 @ 07:29)  POCT Blood Glucose.: 136 mg/dL (01-06-25 @ 21:57)  POCT Blood Glucose.: 112 mg/dL (01-06-25 @ 17:13)  POCT Blood Glucose.: 158 mg/dL (01-06-25 @ 11:30)  POCT Blood Glucose.: 124 mg/dL (01-06-25 @ 07:40)    CT Chest w/ IV Cont:   ACC: [id number] EXAM:  CT ABDOMEN AND PELVIS IC   ORDERED BY: LINDA PHIPPS   ACC: [id number] EXAM:  CT CHEST IC   ORDERED BY: LINDA PHIPPS     PROCEDURE DATE:  01/02/2025     IMPRESSION:  Acute, displaced right femoral neck fracture.  Acute, nondisplaced right anterior 6-7th rib fracture; no pneumothorax.    --- End of Report --- 12.7   9.06  )-----------( 174      ( 06 Jan 2025 05:30 )             39.0     01-06    140  |  104  |  14  ----------------------------<  114[H]  4.5   |  26  |  0.7    Ca    8.0[L]      06 Jan 2025 05:30  Phos  2.5     01-06  Mg     2.1     01-06    TPro  5.7[L]  /  Alb  3.1[L]  /  TBili  0.3  /  DBili  x   /  AST  29  /  ALT  14  /  AlkPhos  55  01-06        POCT Blood Glucose.: 150 mg/dL (01-07-25 @ 11:24)  POCT Blood Glucose.: 137 mg/dL (01-07-25 @ 07:29)  POCT Blood Glucose.: 136 mg/dL (01-06-25 @ 21:57)  POCT Blood Glucose.: 112 mg/dL (01-06-25 @ 17:13)  POCT Blood Glucose.: 158 mg/dL (01-06-25 @ 11:30)  POCT Blood Glucose.: 124 mg/dL (01-06-25 @ 07:40)    CT Chest w/ IV Cont:   ACC: [id number] EXAM:  CT ABDOMEN AND PELVIS IC   ORDERED BY: LINDA PHIPPS   ACC: [id number] EXAM:  CT CHEST IC   ORDERED BY: LINDA PHIPPS     PROCEDURE DATE:  01/02/2025     IMPRESSION:  Acute, displaced right femoral neck fracture.  Acute, nondisplaced right anterior 6-7th rib fracture; no pneumothorax.    --- End of Report ---

## 2025-01-07 NOTE — H&P ADULT - ATTENDING COMMENTS
I reviewed the chart and examined the patient with the resident and we discussed the findings and treatment plan.  The patient is tolerating the beside rehab program well. I agree with the findings and treatment plan above, which I modified as indicated. The patient requires 3 hrs a day of acute inpatient rehab. Patient with PMH above admitted to [facility] after being found on the floor outside of her home s/p trip and fall with no HT, on Eliquis for atrial fib. She was found to have a right femoral neck fracture, multiple rib fractures and bilateral knee abrasions. She underwent right hip hemiarthroplasty and is cleared for WBAT and tolerating the bedside rehab program well. She was fully indep. in mobility with a straight cane and in ADLs PTA. She now requires Vanessa for bed mobility, Vanessa for sit-stand transfer, ambulate 5' x2 Vanessa with RW, modA for bathing, CGA for UBD, maxA for LBD, The patient requires acute interdisciplinary rehab including PT and OT to maximize function for safe d/c home in a reasonable time. The patient can tolerate and benefit from 3 hrs daily therapy and requires Physiatry f/u at least 3 days a week to manage her hip and rib pain and her comorbidities including atrial fib, HTN and DM2 with close monitoring of vitals and her clinical response to therapy.    I read, edited and agree with the physical exam above and assessment:  #Rehab of right femoral neck fracture 2/2 mechanical fall s/p R hip hemiarthroplasty with  gait abnormality and decline in function  #Right anterior 6-7th rib fx   #Multiple BLE abrasions  - CT Chest: Acute, displaced right femoral neck fracture; Acute, nondisplaced right anterior 6-7th rib fracture; no pneumothorax.  - WBAT RLE  -Pain control with tylenol prn and oxycodone 5mg prn    -PT/OT Eval and treat: patient requires 3 hrs daily of interdisciplinary inpatient rehab at least 5 days a week.     #Vitamin D deficiency   - continue with calcium and vitamin D supplementation     #Paroxysmal Afib on eliquis  #CHB with PPM  -evaluated by electrophysiology on admission: follow up outpatient  - continue with home med Eliquis     #HTN  -continue with coreg 3.125mg bid   -monitor BP and adjust medication as needed    #DM2  #POC Glucose, ISS, hypoglycemic protocol     #HLD  -continue with lipitor 80mg qhs  -continue with ezetimibe 10mg daily     #tobacco dependence   -1PPD for past 40 years  - declining nicotine patch at this time   - offer counseling     -Pain control: prn tylenol and prn oxycodone     -GI/Bowel Mgmt: miralax bid      -Skin:  No active issues at this time      - Diet: DASH/ CC

## 2025-01-07 NOTE — H&P ADULT - NSHPPHYSICALEXAM_GEN_ALL_CORE
PHYSICAL EXAMINATION   VItals: T(C): 36.8 (01-07-25 @ 12:59), Max: 36.8 (01-07-25 @ 12:59)  HR: 72 (01-07-25 @ 13:18) (63 - 72)  BP: 164/70 (01-07-25 @ 13:18) (132/76 - 171/73)  RR: 16 (01-07-25 @ 12:59) (16 - 18)  SpO2: 96% (01-07-25 @ 05:54) (96% - 97%)    General:[  x ] NAD, Resting Comfortable,   [   ] other:                                HEENT: [ x  ] NC/AT, EOMI, PERRL , Normal Conjunctivae,   [   ] other:  Cardio: [  x ] RRR, no murmer,   [   ] other:                              Pulm: [ x  ] No Respiratory Distress,  Lungs CTAB,   [   ] other:                       Abdomen: [ x  ]ND/NT, Soft,   [   ] other:    : [ x  ] NO PATEL CATHETER, [   ] PATEL CATHETER- no meatal tear, no discharge, [   ] other:                                            MSK: [   ] No joint swelling, Full ROM,   [x   ] other:   Mild TTP of R hip, limited ROM 2/2 pain and s/p hemiarthroplasty                                       Ext: [ x  ]No C/C/E, No calf tenderness,   [   ]other:    Skin: [   ]intact,   [   x] other: few b/l LE abrasions/scabs                                                                  Neurological Examination:  Cognitive: [ x   ] AAO x 3,   [    ]  other:                                                                      Attention:  [ x   ] intact,   [    ]  other:                               Mood/Affect: [ x   ] wnl,    [    ]  other:                                                                             Communication: [ x   ]Fluent, no dysarthria, following commands:  [    ] other:   CN II - XII:  [ x   ] intact,  [    ] other:                                                                                        Motor:   RIGHT UE: [  x ] WNL,  [   ] other:  LEFT    UE: [  x ] WNL,  [   ] other:  RIGHT LE: [   ] WNL,  [ x  ] other:  3-/5 except distally   LEFT    LE: [x   ] WNL,  [   ] other:    Tone: [    ] wnl,   [    ]  other:  DTRs: [   ]symmetric, [   ] other:  Coordination:   [    ] intact,   [    ] other:                                                                           Sensory: [    ] Intact to light touch,   [    ] other: PHYSICAL EXAMINATION   VItals: T(C): 36.8 (01-07-25 @ 12:59), Max: 36.8 (01-07-25 @ 12:59)  HR: 72 (01-07-25 @ 13:18) (63 - 72)  BP: 164/70 (01-07-25 @ 13:18) (132/76 - 171/73)  RR: 16 (01-07-25 @ 12:59) (16 - 18)  SpO2: 96% (01-07-25 @ 05:54) (96% - 97%)    General:[  x ] NAD, Resting Comfortable,   [   ] other:                                HEENT: [ x  ] NC/AT, EOMI, PERRL , Normal Conjunctivae,   [   ] other:  Cardio: [  x ] RRR, no murmer,   [   ] other:                              Pulm: [ x  ] No Respiratory Distress,  Lungs CTAB,   [   ] other:                       Abdomen: [ x  ]ND/NT, Soft,   [   ] other:    : [ x  ] NO PATEL CATHETER, [   ] PATEL CATHETER- no meatal tear, no discharge, [   ] other:                                            MSK: [   ] No joint swelling, Full ROM,   [x   ] other:   Mild TTP of R hip, limited ROM 2/2 pain and s/p hemiarthroplasty                                       Ext: [ x  ]No C/C/E, No calf tenderness,   [   ]other:    Skin: [   ]intact,   [   x] other: few b/l LE abrasions/scabs                                                                  Neurological Examination:  Cognitive: [ x   ] AAO x 3,   [    ]  other:                                                                      Attention:  [ x   ] intact,   [    ]  other:                               Mood/Affect: [ x   ] wnl,    [    ]  other:                                                                             Communication: [ x   ]Fluent, no dysarthria, following commands:  [    ] other:   CN II - XII:  [ x   ] intact,  [    ] other:                                                                                        Motor:   RIGHT UE: [  x ] WNL,  [   ] other:  LEFT    UE: [  x ] WNL,  [   ] other:  RIGHT LE: [   ] WNL,  [ x  ] other:  3-/5 except distally PF/DF 4+/5  LEFT    LE: [x   ] WNL,  [   ] other:    Tone: [  x  ] wnl,   [    ]  other:  DTRs: [  x ]symmetric, [   ] other:                                                                           Sensory: [   x ] Intact to light touch,   [    ] other:

## 2025-01-07 NOTE — H&P ADULT - NSHPSOCIALHISTORY_GEN_ALL_CORE
LS: lives alone in sixth floor apt with elevator access and 1 [address]  PLOF: Independent in ADLs. Assistance from family/friends for iADLs. Cane for ambulation.

## 2025-01-07 NOTE — PROGRESS NOTE ADULT - SUBJECTIVE AND OBJECTIVE BOX
Patient is a 64y old  Female who presents with a chief complaint of   INTERVAL HPI/OVERNIGHT EVENTS: Patient was examined and seen at bedside. This morning pt is resting comfortably in bed and reports no new issues or overnight events. No complaints, feels ok. Pain is controlled.  ROS: Denies CP, SOB, AP, new weakness  All other systems reviewed and are within normal limits.  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   Patient is coming by her , states that she came to pick her up for a PMD visit and found her outside of her apartment laying on the ground for an unknown period of time.    Patient states that she tripped and fell landing on her right hip which is causing her significant pain.    She denies LOC, denies head strike.      Of note, patient had a fall 1 week ago, also ground-level fall but eloped prior to receiving any medical treatment.  Back then she reports that she had lost control of both of her legs which caused her fall.      Today, she denies shortness of breath, chest pain, abdominal pain, nausea, vomiting.    Denies recent fever, chills    Patient is not presenting from [facility]; patient denied it and I called the [facility] and they confirmed that she is not currently admitted there.     In the ED   Vitals   · BP Systolic	119 mm Hg  · BP Diastolic	77 mm Hg  · Heart Rate	 104 /min  · Respiration Rate (breaths/min)	18 /min  · Temp (C)	37 Degrees C  · SpO2 (%)	94 % room air    CT HEAD:  No acute intracranial findings.    Stable mild-moderate chronic microvascular ischemic changes.    CT CERVICAL SPINE:  No acute cervical fracture or facet subluxation.      CT abdomen and pelvis w IV contrast:  Acute, displaced right femoral neck fracture.  Acute, nondisplaced right anterior 6-7th rib fracture; no pneumothorax.    Xray tibia fibula: TIBIA/FIBULA: No acute displaced fracture. Osteopenia.    TTE 2023:   1. Left ventricular ejection fraction, by visual estimation, is 65 to 70%.   2. Normal global left ventricular systolic function.   3. The left ventricular diastolic function could not be assessed in this study.   4. Mildly enlarged left atrium.   5. Mild-to-moderate mitral regurgitation.   6. Mild tricuspid regurgitation.   (02 Jan 2025 20:34)    PAST MEDICAL & SURGICAL HISTORY:  Diabetes mellitus      Bipolar disorder in partial remission, most recent episode unspecified type  Last episodes six years ago      History of hysterectomy for malignancy          General: NAD, AAO3  HEENT:  EOMI, no LAD  CV: S1 S2  Resp: decreased breath sounds at bases  GI: NT/ND/S +BS  MS: no clubbing/cyanosis/edema, + pulses b/l  Neuro: limited ROM RLE due to pain. C/d/i dsg          Home Medications:  acetaminophen 325 mg oral tablet: 2 tab(s) orally every 6 hours (06 Jan 2025 11:27)  calcium carbonate 1250 mg (500 mg elemental calcium) oral tablet: 1 tab(s) orally 2 times a day (06 Jan 2025 11:27)  Coreg 3.125 mg oral tablet: 1 tab(s) orally 2 times a day (07 Jan 2025 12:58)  Eliquis 5 mg oral tablet: 1 tab(s) orally 2 times a day (06 Jan 2025 11:27)  ezetimibe 10 mg oral tablet: 1 tab(s) orally once a day (06 Jan 2025 11:27)  furosemide 40 mg oral tablet: 1 tab(s) orally once a day as needed for fluid overload (07 Jan 2025 12:38)  insulin lispro 100 units/mL injectable solution: 1 unit(s) injectable 3 times a day (with meals) as needed for  hyperglycemia 2 Unit(s) if Glucose 151 - 200  4 Unit(s) if Glucose 201 - 250  6 Unit(s) if Glucose 251 - 300  8 Unit(s) if Glucose 301 - 350  10 Unit(s) if Glucose 351 - 400  12 Unit(s) if Glucose Greater Than 400 (06 Jan 2025 15:01)  melatonin 10 mg oral capsule: 1 cap(s) orally once a day (at bedtime) (06 Jan 2025 11:26)  oxyCODONE 5 mg oral tablet: 1 tab(s) orally every 6 hours As needed Moderate Pain (4 - 6) (06 Jan 2025 11:27)  polyethylene glycol 3350 oral powder for reconstitution: 17 gram(s) orally 2 times a day (06 Jan 2025 11:27)  Prolixin Decanoate 25 mg/mL injectable solution: 125 milligram(s) intramuscularly every 2 weeks (06 Jan 2025 11:02)    MEDICATIONS  (STANDING):  acetaminophen     Tablet .. 650 milliGRAM(s) Oral every 6 hours  amLODIPine   Tablet 5 milliGRAM(s) Oral daily  apixaban 5 milliGRAM(s) Oral every 12 hours  calcium carbonate   1250 mG (OsCal) 1 Tablet(s) Oral two times a day  dextrose 5%. 1000 milliLiter(s) (50 mL/Hr) IV Continuous <Continuous>  dextrose 5%. 1000 milliLiter(s) (100 mL/Hr) IV Continuous <Continuous>  dextrose 50% Injectable 25 Gram(s) IV Push once  dextrose 50% Injectable 12.5 Gram(s) IV Push once  dextrose 50% Injectable 25 Gram(s) IV Push once  ergocalciferol 67872 Unit(s) Oral every week  glucagon  Injectable 1 milliGRAM(s) IntraMuscular once  influenza   Vaccine 0.5 milliLiter(s) IntraMuscular once  insulin lispro (ADMELOG) corrective regimen sliding scale   SubCutaneous three times a day before meals  lidocaine   4% Patch 1 Patch Transdermal daily  polyethylene glycol 3350 17 Gram(s) Oral two times a day    MEDICATIONS  (PRN):  dextrose Oral Gel 15 Gram(s) Oral once PRN Blood Glucose LESS THAN 70 milliGRAM(s)/deciliter  morphine  - Injectable 2 milliGRAM(s) IV Push every 6 hours PRN Severe Pain (7 - 10)  oxyCODONE    IR 5 milliGRAM(s) Oral every 6 hours PRN Moderate Pain (4 - 6)    Vital Signs Last 24 Hrs  T(C): 36.8 (07 Jan 2025 12:59), Max: 36.8 (07 Jan 2025 12:59)  T(F): 98.3 (07 Jan 2025 12:59), Max: 98.3 (07 Jan 2025 12:59)  HR: 72 (07 Jan 2025 13:18) (63 - 72)  BP: 164/70 (07 Jan 2025 13:18) (132/76 - 171/73)  BP(mean): 101 (07 Jan 2025 13:18) (101 - 101)  RR: 16 (07 Jan 2025 12:59) (16 - 18)  SpO2: 96% (07 Jan 2025 05:54) (96% - 97%)    Parameters below as of 07 Jan 2025 12:59  Patient On (Oxygen Delivery Method): room air      CAPILLARY BLOOD GLUCOSE      POCT Blood Glucose.: 150 mg/dL (07 Jan 2025 11:24)  POCT Blood Glucose.: 137 mg/dL (07 Jan 2025 07:29)  POCT Blood Glucose.: 136 mg/dL (06 Jan 2025 21:57)  POCT Blood Glucose.: 112 mg/dL (06 Jan 2025 17:13)    LABS:                        12.7   9.06  )-----------( 174      ( 06 Jan 2025 05:30 )             39.0     01-06    140  |  104  |  14  ----------------------------<  114[H]  4.5   |  26  |  0.7    Ca    8.0[L]      06 Jan 2025 05:30  Phos  2.5     01-06  Mg     2.1     01-06    TPro  5.7[L]  /  Alb  3.1[L]  /  TBili  0.3  /  DBili  x   /  AST  29  /  ALT  14  /  AlkPhos  55  01-06    LIVER FUNCTIONS - ( 06 Jan 2025 05:30 )  Alb: 3.1 g/dL / Pro: 5.7 g/dL / ALK PHOS: 55 U/L / ALT: 14 U/L / AST: 29 U/L / GGT: x                 Urinalysis Basic - ( 06 Jan 2025 05:30 )    Color: x / Appearance: x / SG: x / pH: x  Gluc: 114 mg/dL / Ketone: x  / Bili: x / Urobili: x   Blood: x / Protein: x / Nitrite: x   Leuk Esterase: x / RBC: x / WBC x   Sq Epi: x / Non Sq Epi: x / Bacteria: x              Consultant Notes Reviewed:  [x ] YES  [ ] NO  Care Discussed with Consultants/Other Providers/ Housestaff [ x] YES  [ ] NO  Radiology, labs, EKGs, new studies personally reviewed.

## 2025-01-07 NOTE — PATIENT PROFILE ADULT - PATIENT'S GENDER IDENTITY
24    Zaira Joy  1976    Chief Complaint   Patient presents with    Gynecologic Exam     Pt here for annual, is not sexually active, no menses d/t ablation, pap-2022-ASCUS, mammo-2024-neg.     Other     Pt c/o hot flashes x 1 months,  vulvar lesions x couple months.         The patient is a 48 y.o. female,  who presents for her annual exam.  She is amenorrheic due to Novasure.  She is not sexually active. She is currently taking birth control.  Birth control method is sterilization.    She reports additional symptoms of vulvar lesion.      Pap smear history: Her last PAP smear was in .  Her results were normal.    Breast history: her most recent mammogram was in 2024.  The results were: Normal after f/u u/s    Past Medical History:   Diagnosis Date    Bipolar disorder (HCC)     Black-out (not amnesia)     CAD (coronary artery disease)     MI secondary to ketoacidosis    Cataract     Depression     Diabetes mellitus (HCC)     Hot flashes     Hx of blood clots     in foot    Hyperlipidemia     Hypothyroidism     Irritable bowel syndrome     Ketoacidosis     8 times [id number]    Migraine     last in 2017    Missing teeth, acquired     Neuropathy     Obesity     previous to Adrian-en-y surgery   Lost approx 130lb    Prolonged emergence from general anesthesia     Sleep apnea 2012    Stroke (HCC)     TIA (transient ischemic attack)     Type 1 diabetes mellitus (HCC)     Urinary incontinence     Wears glasses        Past Surgical History:   Procedure Laterality Date    BLADDER SURGERY      has a bladder stimulator---placed and replaced in     CATARACT REMOVAL      CHOLECYSTECTOMY      COLONOSCOPY  2016    poor colonic preparation throughout    COLONOSCOPY  2016    int hem    COLONOSCOPY      ENDOMETRIAL ABLATION      ENDOSCOPY, COLON, DIAGNOSTIC  2016    normal appearing adrian-en-y, biopsies to rule out celiac sprue    GASTRIC BYPASS SURGERY   NAUSEA FOR VOMITING 30 tablet 0    NEXIUM 40 MG delayed release capsule TAKE 1 CAPSULE BY MOUTH ONCE DAILY IN THE MORNING BEFORE BREAKFAST 30 capsule 3    levothyroxine (SYNTHROID) 175 MCG tablet Take 1 tablet by mouth daily 30 tablet 3    ciclopirox (PENLAC) 8 % solution Apply topically nightly. 1 each 1    topiramate (TOPAMAX) 200 MG tablet Take 1 tablet by mouth nightly 90 tablet 1    ketoconazole (NIZORAL) 2 % shampoo LATHER INTO THE SCALP AND LEAVE ON FOR 5 TO 10 MINUTES AND THEN RINSE 2 TO 3 TIMES A WEEK      OXcarbazepine (TRILEPTAL) 600 MG tablet Take 1 tablet by mouth 2 times daily 300 mg a.m and 600 mg p.m      Insulin Regular Human 12 units POWD Inhale 1 Cartridge into the lungs 4 times daily Pt takes only 16-24 units at dinner time and adjusts dose at bedtime due to bld sugar      Cholecalciferol 125 MCG (5000 UT) TBDP Take 1 tablet by mouth daily      insulin detemir (LEVEMIR FLEXTOUCH) 100 UNIT/ML injection pen Inject 13 Units into the skin nightly (Patient taking differently: Inject into the skin 2 times daily 18 units at 9 AM and 10 units at 9pm) 5 Pen 3    clonazePAM (KLONOPIN) 0.5 MG tablet Take 4 tablets by mouth 2 times daily as needed for Anxiety.      PARoxetine (PAXIL) 40 MG tablet Take 1 tablet by mouth nightly      baclofen (LIORESAL) 20 MG tablet Take 1 tablet by mouth 3 times daily as needed (Patient not taking: Reported on 8/27/2024)      ferrous gluconate (FERGON) 324 (38 Fe) MG tablet Take 1 tablet by mouth daily (with breakfast) (Patient not taking: Reported on 8/27/2024) 30 tablet 3     No current facility-administered medications for this visit.       Allergies   Allergen Reactions    Latex     Cephalexin Anaphylaxis    Dairycare [Lactase-Lactobacillus] Other (See Comments)     Boils, GI upset     Lyrica [Pregabalin]     Mirapex [Pramipexole] Anaphylaxis    Penicillins Anaphylaxis    Prednisone Other (See Comments)     'Roid rage'  And severe bld sugar elevation    Sulfa Antibiotics  Female

## 2025-01-07 NOTE — H&P ADULT - NSHPREVIEWOFSYSTEMS_GEN_ALL_CORE
Constiutional:    [   ] WNL           [   ] poor appetite   [   ] insomnia   [   ] tired   Cardio:                [   ] WNL           [   ] CP   [   ] WARD   [   ] palpitations               Resp:                   [ x  ] WNL           [   ] SOB   [   ] cough   [   ] wheezing   GI:                        [ x  ] WNL           [   ] constipation   [   ] diarrhea   [   ] abdominal pain   [   ] nausea   [   ] emesis                                :                      [x   ] WNL           [   ] PATEL  [   ] dusuria   [   ] difficulty voiding             Endo:                   [x   ] WNL          [   ] po;yuria   [   ] temperature intolerance                 Skin:                     [ x  ] WNL          [   ] pain   [   ] wound   [   ] rash   MSK:                    [   ] WNL          [   ] muscle pain   [  x ] joint pain/ stiffness   [x   ] muscle tenderness   [   ] swelling   Neuro:                 [x   ] WNL          [   ] HA   [   ] change in vision   [   ] tremor   [   ] weakness   [   ]dysphagia              Cognitive:           [ x  ] WNL           [   ]confusion      Psych:                  [ x  ] WNL           [   ] hallucinations   [   ]agitation   [   ] delusion   [   ]depression Constiutional:    [  x ] WNL           [   ] poor appetite   [   ] insomnia   [   ] tired   Cardio:                [ x  ] WNL           [   ] CP   [   ] WARD   [   ] palpitations               Resp:                   [ x  ] WNL           [   ] SOB   [   ] cough   [   ] wheezing   GI:                        [ x  ] WNL           [   ] constipation   [   ] diarrhea   [   ] abdominal pain   [   ] nausea   [   ] emesis                                :                      [x   ] WNL           [   ] PATEL  [   ] dysuria   [   ] difficulty voiding             Endo:                   [x   ] WNL          [   ] po;yuria   [   ] temperature intolerance                 Skin:                     [ x  ] WNL          [   ] pain   [   ] wound   [   ] rash   MSK:                    [   ] WNL          [   ] muscle pain   [  x ] joint pain/ stiffness   [x   ] muscle tenderness right hip   [   ] swelling   Neuro:                 [x   ] WNL          [   ] HA   [   ] change in vision   [   ] tremor   [   ] weakness   [   ]dysphagia              Cognitive:           [ x  ] WNL           [   ]confusion      Psych:                  [ x  ] WNL           [   ] hallucinations   [   ]agitation   [   ] delusion   [   ]depression

## 2025-01-07 NOTE — PATIENT PROFILE ADULT - FALL HARM RISK - HARM RISK INTERVENTIONS

## 2025-01-07 NOTE — PATIENT PROFILE ADULT - NSPROPTRIGHTNOTIFY_GEN_A_NUR
Patient notified.  Patient voiced understanding of information given  Patient is not feeling PVC's as often now and would like to hold off on Holter monitor.   declines

## 2025-01-07 NOTE — H&P ADULT - HISTORY OF PRESENT ILLNESS
Patient is a 63 yo female with PMHx of Afib on eliquis, HTN, DM, schizophrenia who presented to the ED on 01/02/25 after an unwitnessed fall with no head strike and no LOC. Patient was found on the ground outside of her apartment by her   who was there to  the patient for a PCP visit. Of note, patient suffered a ground level fall 1 week ago 2/2 LE weakness and was seen in the ED, however eloped prior to receiving medical tx.    Imaging in ED:     CT HEAD:  No acute intracranial findings.  Stable mild-moderate chronic microvascular ischemic changes.    CT CERVICAL SPINE:  No acute cervical fracture or facet subluxation.    CT abdomen and pelvis w IV contrast:  Acute, displaced right femoral neck fracture.  Acute, nondisplaced right anterior 6-7th rib fracture; no pneumothorax.    Xray tibia fibula:  No acute displaced fracture. Osteopenia.       Patient is a 63 yo female with PMHx of Afib on eliquis, HTN, DM, CHB wiht PPM, schizophrenia who presented to the ED on 01/02/25 after an unwitnessed fall with no head strike and no LOC. Patient was found on the ground outside of her apartment for an unknown period of time by her   who was there to  the patient for a PCP visit. Post- fall patient endorsed right hip pain, Of note, patient suffered a ground level fall 1 week prior 2/2 LE weakness however eloped prior to receiving medical tx. Imaging in ED showed:    CT HEAD:  No acute intracranial findings.  Stable mild-moderate chronic microvascular ischemic changes.    CT CERVICAL SPINE:  No acute cervical fracture or facet subluxation.    CT abdomen and pelvis w IV contrast:  Acute, displaced right femoral neck fracture.  Acute, nondisplaced right anterior 6-7th rib fracture; no pneumothorax.    XRAY tibia fibula:  No acute displaced fracture. Osteopenia.    Patient was evaluated by orthopedics and is s/p R hip hemiarthroplasty on 1/3 and WBAT RLE.    LS: lives alone   PLOF: Independent in ADLs. Cane for ambulation.   CLOF: Vanessa for bed mobility, maxA supine to sit bed mobility, modA       Patient is a 63 yo female with PMHx of Afib on eliquis, HTN, DM, CHB wiht PPM, schizophrenia who presented to the ED on 01/02/25 after an unwitnessed fall with no head strike and no LOC. Patient was found on the ground outside of her apartment for an unknown period of time by her   who was there to  the patient for a PCP visit. Post- fall patient endorsed right hip pain, Of note, patient suffered a ground level fall 1 week prior 2/2 LE weakness however eloped prior to receiving medical tx.   Imaging in ED showed:    CT HEAD:  No acute intracranial findings.  Stable mild-moderate chronic microvascular ischemic changes.    CT CERVICAL SPINE:  No acute cervical fracture or facet subluxation.    CT abdomen and pelvis w IV contrast:  Acute, displaced right femoral neck fracture.  Acute, nondisplaced right anterior 6-7th rib fracture; no pneumothorax.    XRAY tibia fibula:  No acute displaced fracture. Osteopenia.    Patient was evaluated by orthopedics and is s/p R hip hemiarthroplasty on 1/3 and WBAT RLE.    LS: lives alone   PLOF: Independent in ADLs. Assistance from family/friends for iADLs. Cane for ambulation.   CLOF: Vanessa for bed mobility, maxA supine to sit bed mobility, modA for sit-stand transfer, modA for bathing, CGA for UBD, maxA for LBD, CGA for toileting       Patient is a 63 yo female with PMHx of Afib on eliquis, HTN, DM, CHB wiht PPM, schizophrenia who presented to the ED on 01/02/25 after an unwitnessed fall with no head strike and no LOC. Patient was found on the ground outside of her apartment for an unknown period of time by her   who was there to  the patient for a PCP visit. Post- fall patient endorsed right hip pain, Of note, patient suffered a ground level fall 1 week prior 2/2 LE weakness however eloped prior to receiving medical tx.   Imaging in ED showed:    CT HEAD:  No acute intracranial findings.  Stable mild-moderate chronic microvascular ischemic changes.    CT CERVICAL SPINE:  No acute cervical fracture or facet subluxation.    CT abdomen and pelvis w IV contrast:  Acute, displaced right femoral neck fracture.  Acute, nondisplaced right anterior 6-7th rib fracture; no pneumothorax.    XRAY tibia fibula:  No acute displaced fracture. Osteopenia.    Patient was evaluated by orthopedics and is s/p R hip hemiarthroplasty on 1/3 and WBAT RLE.     Prior to admission, the patient was independent in ADLs, had assistance from family and friends for iADLs, and ambulated without an assistive device. Patient now requires Vanessa for bed mobility, Vanessa for sit-stand transfer, ambulate 5' x2 Vanessa with RW, modA for bathing, CGA for UBD, maxA for LBD, CGA for toileting 2/2 to R hip hemiarthroplasty. Therefore, there is a significant functional decline from baseline. Patients also with medical comorbidities of Afib, HTN, DM requiring medication management and monitoring of vitals by Physiatry team and nursing. There are significant comorbidities which warrants acute rehab hospitalization. To return home it is in the patient’s best interest to have acute inpatient rehabilitative services to undergo intensive interdisciplinary therapy. Of note, the patient lives alone and would have difficulty performing ADLs and iADLs individually. Furthermore, the patient is motivated and is able to tolerate up to 3 hours of daily therapy for 5-6 days a week for a total of 15 hours a week. Evaluated by Physiatry and deemed to be an excellent candidate for admission to  for acute inpatient rehab. Admitted to Acute Rehab on 1/7/25.     LS: lives alone in sixth floor apt with elevator access and 1 MITCHELL  PLOF: Independent in ADLs. Assistance from family/friends for iADLs. Cane for ambulation.      Patient is a 63 yo female with PMHx of Afib on eliquis, HTN, DM, CHB wiht PPM, schizophrenia who presented to the ED on 01/02/25 after an unwitnessed fall with no head strike and no LOC. Patient was found on the ground outside of her apartment for an unknown period of time by her   who was there to  the patient for a PCP visit. Denied dizziness, tachycardia, diaphoresis prior to fall. Post- fall patient endorsed right hip pain, Of note, patient suffered a ground level fall 1 week prior 2/2 LE weakness however eloped prior to receiving medical tx. Imaging in ED showed:    CT HEAD:  No acute intracranial findings.  Stable mild-moderate chronic microvascular ischemic changes.    CT CERVICAL SPINE:  No acute cervical fracture or facet subluxation.    CT abdomen and pelvis w IV contrast:  Acute, displaced right femoral neck fracture.  Acute, nondisplaced right anterior 6-7th rib fracture; no pneumothorax.    XRAY tibia fibula:  No acute displaced fracture. Osteopenia.    Patient was evaluated by orthopedics and is s/p R hip hemiarthroplasty on 1/3 and WB RLE.     Prior to admission, the patient was independent in ADLs, had assistance from family and friends for iADLs, and ambulated without an assistive device. Patient now requires Vanessa for bed mobility, Vanessa for sit-stand transfer, ambulate 5' x2 Vanessa with RW, modA for bathing, CGA for UBD, maxA for LBD, CGA for toileting 2/2 to R hip hemiarthroplasty. Therefore, there is a significant functional decline from baseline. Patients also with medical comorbidities of Afib, HTN, DM requiring medication management and monitoring of vitals by Physiatry team and nursing. There are significant comorbidities which warrants acute rehab hospitalization. To return home it is in the patient’s best interest to have acute inpatient rehabilitative services to undergo intensive interdisciplinary therapy. Of note, the patient lives alone and would have difficulty performing ADLs and iADLs individually. Furthermore, the patient is motivated and is able to tolerate up to 3 hours of daily therapy for 5-6 days a week for a total of 15 hours a week. Evaluated by Physiatry and deemed to be an excellent candidate for admission to  for acute inpatient rehab. Admitted to Acute Rehab on 1/7/25.     LS: lives alone in sixth floor apt with elevator access and 1 MITCHELL  PLOF: Independent in ADLs. Assistance from family/friends for iADLs. Cane for ambulation.

## 2025-01-07 NOTE — H&P ADULT - ASSESSMENT
ASSESSMENT/PLAN    65 yo female with PMHx of Afib on eliquis, HTN, DM, CHB wiht PPM, schizophrenia who presented to the ED on 01/02/25 after an unwitnessed fall with right femoral neck fracture on imaging s/p R hip hemiarthroplasty on 1/3.     #Rehab of gait abnormality and decline in function iso right femoral neck fracture 2/2 mechanical fall s/p R hip hemiarthroplasty   #Right anterior 6-7th rib fx   #Vitamin D deficiency   - XR Right femur: Redemonstrated displaced right femoral neck fracture  - CT Chest: Acute, displaced right femoral neck fracture; Acute, nondisplaced right anterior 6-7th rib fracture; no pneumothorax.  - WBAT RLE  -Pain control with tylenol prn and oxycodone 5mg prn  - continue with calcium and vitamin D supplementation   -PT/OT Eval and treat: patient requires 3 hrs daily of interdisciplinary inpatient rehab at least 5 days a week.     #Paroxysmal Afib on eliquis  #CHB with PPM  -evaluated by electrophysiology on admission: follow up outpatient  - continue with home med Eliquis     #HTN  -continue with coreg 3.125mg bid   -monitor BP and adjust medication as needed    #DM  #POC Glucose, ISS, hypoglycemic protocol     #HLD  -continue with lipitor 80mg qhs  -continue with ezetimibe 10mg daily     #tobacco dependence   -1PPD for past 40 years  - declining nicotine patch at this time   - offer counseling     -Pain control: prn tylenol and prn oxycodone     -GI/Bowel Mgmt: miralax bid     -Bladder management: --     -Skin:  No active issues at this time    -FEN     - Diet: DASH           Precautions / PROPHYLAXIS:      - Falls    - Ortho: Weight bearing status: WBAT RLE      - DVT prophylaxis:Eliquis      MEDICAL PROGNOSIS: GOOD            REHAB POTENTIAL: GOOD             ESTIMATED DISPOSITION: HOME WITH HOME CARE       [ x ]  The goals of the IRF admission were discussed with the patient and or family member, who agreed             ELOS:  [   x  ] 7-14    [    ]  14-21    [    ]  Other    THERAPY ORDERS and INITIAL INDIVIDUALIZED PLAN OF CARE:  This initial individualized interdisciplinary plan of care, which was established by me (the attending physiatrist), is based on elements from the post admission evaluation. The interdisciplinary therapy program is to be at least 3 hrs a day, at least 5 days per week from from physical, occupational and/ or speech therapies as ordered by me below.      [ x  ] P.T. 90 mins. /day at least 5 out of 7 days:  [  x ] superficial  modalities prn, [ x  ] A/AAROM, [ x  ] PREs, [ x  ] transfer training,            [ x  ] progressive ambulation, [x   ] stairs                                               [ x  ] O.T. 90 mins. /day at least 5 out of 7 days::  [ x  ] modalities prn,  [ x  ]A/AAROM, [ x  ] PREs, [  x ] functional transfer training, [ x  ] ADL training           [   ] cognitive/ perceptual eval and training, [   ] splint eval                                                  [   ] S.L.P:  [   ] speech eval, [   ] swallow eval     [   ] Neuropsychology eval     [ x  ] Individualized rec. therapy      RATIONALE FOR INPATIENT ADMISSION - Patient demonstrates the following: (check all that apply)  [X] Medically appropriate for acute rehabilitation admission. Requires interdisiplinary therapy consisting of at least PT and OT, at least 3 hrs. a day at least 5 days a week  [X] Has attainable rehab goals with an appropriate initial discharge plan  [X] Has rehabilitation potential (expected to make a significant improvement within a reasonable period of time)  [X] Requires close medical management by a rehab physician, rehab nursing care,  and comprehensive interdisciplinary team (including PT, OT)    [X] Requires evaluation by a physiatrist at least 3 days a week to evaluate and manage and coordinate rehab and medical problems        ASSESSMENT/PLAN    63 yo female with PMHx of Afib on eliquis, HTN, DM, CHB wiht PPM, schizophrenia who presented to the ED on 01/02/25 after an unwitnessed fall with right femoral neck fracture on imaging s/p R hip hemiarthroplasty on 1/3.     #Rehab of right femoral neck fracture 2/2 mechanical fall s/p R hip hemiarthroplasty with  gait abnormality and decline in function  #Right anterior 6-7th rib fx   #Multiple BLE abrasions  - CT Chest: Acute, displaced right femoral neck fracture; Acute, nondisplaced right anterior 6-7th rib fracture; no pneumothorax.  - WBAT RLE  -Pain control with tylenol prn and oxycodone 5mg prn    -PT/OT Eval and treat: patient requires 3 hrs daily of interdisciplinary inpatient rehab at least 5 days a week.     #Vitamin D deficiency   - continue with calcium and vitamin D supplementation     #Paroxysmal Afib on eliquis  #CHB with PPM  -evaluated by electrophysiology on admission: follow up outpatient  - continue with home med Eliquis     #HTN  -continue with coreg 3.125mg bid   -monitor BP and adjust medication as needed    #DM2  #POC Glucose, ISS, hypoglycemic protocol     #HLD  -continue with lipitor 80mg qhs  -continue with ezetimibe 10mg daily     #tobacco dependence   -1PPD for past 40 years  - declining nicotine patch at this time   - offer counseling     -Pain control: prn tylenol and prn oxycodone     -GI/Bowel Mgmt: miralax bid      -Skin:  No active issues at this time      - Diet: DASH/ CC           Precautions / PROPHYLAXIS:      - Falls    - Ortho: Weight bearing status: WBAT RLE      - DVT prophylaxis:Eliquis      MEDICAL PROGNOSIS: GOOD            REHAB POTENTIAL: GOOD for indep ambulation with device            ESTIMATED DISPOSITION: HOME WITH HOME CARE       [ x ]  The goals of the IRF admission were discussed with the patient, who agreed             ELOS:  [   x  ] 7-14    [    ]  14-21    [    ]  Other    THERAPY ORDERS and INITIAL INDIVIDUALIZED PLAN OF CARE:  This initial individualized interdisciplinary plan of care, which was established by me (the attending physiatrist), is based on elements from the post admission evaluation. The interdisciplinary therapy program is to be at least 3 hrs a day, at least 5 days per week from from physical, occupational and/ or speech therapies as ordered by me below.      [ x  ] P.T. 90 mins. /day at least 5 out of 7 days:  [  x ] superficial  modalities prn, [ x  ] A/AAROM, [ x  ] PREs, [ x  ] transfer training,            [ x  ] progressive ambulation, [x   ] stairs                                               [ x  ] O.T. 90 mins. /day at least 5 out of 7 days::  [ x  ] modalities prn,  [ x  ]A/AAROM, [ x  ] PREs, [  x ] functional transfer training, [ x  ] ADL training           [   ] cognitive/ perceptual eval and training, [   ] splint eval                                                  [   ] S.L.P:  [   ] speech eval, [   ] swallow eval     [   ] Neuropsychology eval     [ x  ] Individualized rec. therapy      RATIONALE FOR INPATIENT ADMISSION - Patient demonstrates the following: (check all that apply)  [X] Medically appropriate for acute rehabilitation admission. Requires interdisiplinary therapy consisting of at least PT and OT, at least 3 hrs. a day at least 5 days a week  [X] Has attainable rehab goals with an appropriate initial discharge plan  [X] Has rehabilitation potential (expected to make a significant improvement within a reasonable period of time)  [X] Requires close medical management by a rehab physician, rehab nursing care,  and comprehensive interdisciplinary team (including PT, OT)    [X] Requires evaluation by a physiatrist at least 3 days a week to evaluate and manage and coordinate rehab and medical problems

## 2025-01-07 NOTE — PATIENT PROFILE ADULT - NSPROMEDSBROUGHTTOHOSP_GEN_A_NUR
12/11/2017      RE: Ravindra Fregoso  [address] [facility] [id number]       [facility]    Hepatology follow-up    Follow-up visit for cirrhosis    Subjective:  59 year old male    Cirrhosis  - ETOH  - last ETOH ~2 months ago  - hx ascites, on Bumex and triamterene (gynecomastia on spironolactone)  - hx HE, on rifaximin   - no hx variceal bleed  - last EGD Dec 2016- gd I varices, portal HTN gastropathy  - HCC screening- Abd U/S Dec 2017     The patient comes to clinic this morning with his wife for follow-up of alcoholic cirrhosis.  Last clinic visit in 06/2017.  Since then, the patient saw GI (panc-bili) for follow-up of a pancreatic fluid collection and chronic pancreatitis.  No new medications, ER visits or hospital admissions since last clinic visit.  The patient had his influenza vaccination earlier this winter.      Patient is doing well.  He has gained 7 pounds of weight since his last clinic visit which his wife attributes to increased caloric intake in the form of desserts.      The patient feels some abdominal distention.  An ultrasound shows no evidence of intraabdominal fluid.  No evidence of lower extremity edema.  The patient is less than adherent to a low-salt diet.  He takes his diuretics regularly.      The patient denies jaundice, lethargy or confusion.      No history of melena, hematemesis or hematochezia.      The patient denies any fevers, sweats or chills.      The patient continues to drink alcohol.  He last drank alcohol yesterday.  He is drinking on average 2 vodka drinks per week.  The patient continues to smoke.  The patient and his wife are considering looking at cruises to [place] in the new year.      Medical hx Surgical hx   Past Medical History:   Diagnosis Date     Cirrhosis of liver (H)      Depression      Osteoporosis      Polyps, colonic       Past Surgical History:   Procedure Laterality Date     ESOPHAGOSCOPY, GASTROSCOPY, DUODENOSCOPY (EGD),  COMBINED  11/2/2012    Procedure: COMBINED ESOPHAGOSCOPY, GASTROSCOPY, DUODENOSCOPY (EGD);;  Surgeon: Darell Stewart MD;  Location:  GI     ESOPHAGOSCOPY, GASTROSCOPY, DUODENOSCOPY (EGD), COMBINED  12/6/2013    Procedure: COMBINED ESOPHAGOSCOPY, GASTROSCOPY, DUODENOSCOPY (EGD);;  Surgeon: Darell Stewart MD;  Location:  GI     ESOPHAGOSCOPY, GASTROSCOPY, DUODENOSCOPY (EGD), COMBINED N/A 12/17/2014    Procedure: COMBINED ESOPHAGOSCOPY, GASTROSCOPY, DUODENOSCOPY (EGD), BIOPSY SINGLE OR MULTIPLE;  Surgeon: Philippe Zhu MD;  Location:  GI     ESOPHAGOSCOPY, GASTROSCOPY, DUODENOSCOPY (EGD), COMBINED N/A 12/22/2016    Procedure: COMBINED ESOPHAGOSCOPY, GASTROSCOPY, DUODENOSCOPY (EGD);  Surgeon: Rich Haque MD;  Location:  GI     HC UGI ENDOSCOPY W EUS Left 5/4/2015    Procedure: COMBINED ENDOSCOPIC ULTRASOUND, ESOPHAGOSCOPY, GASTROSCOPY, DUODENOSCOPY (EGD);  Surgeon: Tremaine Sanches MD;  Location:  GI     HERNIORRHAPHY INGUINAL      bilateral repair     ORTHOPEDIC SURGERY      fx left leg, left hip      ORTHOPEDIC SURGERY      fx right ankle and right arm          Medications  Prior to Admission medications    Medication Sig Start Date End Date Taking? Authorizing Provider   potassium chloride SA (K-DUR/KLOR-CON M) 20 MEQ CR tablet Take 1 tablet (20 mEq) by mouth 2 times daily 10/16/17  Yes Rich Haque MD   bumetanide (BUMEX) 1 MG tablet Take 1 tablet (1 mg) by mouth daily 10/6/17  Yes Rich Haque MD   omeprazole (PRILOSEC) 20 MG CR capsule Take 1 capsule (20 mg) by mouth daily 9/29/17  Yes Rich Haque MD   amylase-lipase-protease (CREON 24) 83416 UNITS CPEP per EC capsule Take 1 capsule (24,000 Units) by mouth 3 times daily (with meals) Take 2-3 with meals, and 1-2 with snacks. Not to exceed 15/day 6/16/17  Yes Guru Doron Barnett MD   insulin lispro (HUMALOG KWIKPEN) 100 UNIT/ML injection Inject Subcutaneous  "3 times daily (before meals) sliding scale   Yes Reported, Patient   FLUoxetine 20 MG tablet Take 20 mg by mouth daily   Yes Reported, Patient   aMILoride (MIDAMOR) 5 MG tablet Take 2 tablets (10 mg) by mouth daily 6/12/17  Yes Rich Haque MD   insulin glargine (LANTUS) 100 UNIT/ML injection Inject 22 Units Subcutaneous At Bedtime  Patient taking differently: Inject 20 Units Subcutaneous At Bedtime  4/23/17  Yes Natanael Rios MD   blood glucose (NO BRAND SPECIFIED) lancets standard Use to test blood sugar 4 times daily or as directed. 4/23/17  Yes Natanael Rios MD   blood glucose monitoring (ACCU-CHEK ROBERTO) test strip Use to test blood sugars 4 times daily or as directed. 4/23/17  Yes Natanael Rios MD   Sharps Container MISC 1 Units 4 times daily as needed 4/23/17  Yes Natanael Rios MD   rifaximin (XIFAXAN) 550 MG TABS tablet Take 1 tablet (550 mg) by mouth 2 times daily 2/23/17  Yes Mini Carey MD   lactulose (CHRONULAC) 10 GM/15ML solution Take 20 g by mouth 2 times daily   Yes Unknown, Entered By History   alendronate (FOSAMAX) 70 MG tablet Take 70 mg by mouth every 7 days   Yes Reported, Patient   Multiple Vitamins-Minerals (MULTIVITAL) TABS Take 1 tablet by mouth 11/19/15  Yes Rich Haque MD       Allergies  Allergies   Allergen Reactions     Atorvastatin Calcium      No Clinical Screening - See Comments      Platelets ( infusion)       Review of systems  A 10-point review of systems was negative    Examination  /83  Pulse 70  Temp 98.1  F (36.7  C) (Oral)  Ht 1.626 m (5' 4\")  Wt 72.1 kg (159 lb)  BMI 27.29 kg/m2  Body mass index is 27.29 kg/(m^2).    Gen- well, NAD, A+Ox3, normal color  Lym- no palpable LAD  CVS- RRR  RS- CTA  Abd- overweight, soft, non-tender, no a  Extr- hands normal, no JOHN  Skin- no rash or jaundice  Neuro- no asterixis  Psych- normal mood    Laboratory  Lab Results   Component Value Date     12/11/2017    POTASSIUM " 4.9 12/11/2017    CHLORIDE 108 12/11/2017    CO2 28 12/11/2017    BUN 19 12/11/2017    CR 0.99 12/11/2017       Lab Results   Component Value Date    BILITOTAL 0.7 12/11/2017    ALT 31 12/11/2017    AST 38 12/11/2017    ALKPHOS 121 12/11/2017       Lab Results   Component Value Date    ALBUMIN 3.1 12/11/2017    PROTTOTAL 7.2 12/11/2017        Lab Results   Component Value Date    WBC 7.1 12/11/2017    HGB 13.4 12/11/2017     12/11/2017     12/11/2017       Lab Results   Component Value Date    INR 1.17 12/11/2017       Radiology  Abd U/S Dec 2017 reviewed    Assessment  59-year-old male who presents for routine follow-up of decompensated alcoholic cirrhosis complicated with history of ascites and hepatic encephalopathy.  MELD-Na= 8(improved).  Last ETOH= 12/10/2017.  Ascites and hepatic encephalopathy are well-controlled on medical regimen.  Weight gain is related to increased caloric intake in the absence of any intra-abdominal fluid on ultrasound.  Up-to-date with HCC screening.  Up-to-date with surveillance of esophageal varices.      We discussed the importance of alcohol cessation with regards to prevention of progression of liver disease in addition to the importance of smoking cessation with regards to general health, development of pancreatic cancer (in chronic pancreatitis) and other malignancies.     Plan  1.  Complete abstinence from alcohol  2.  Low salt diet  3.  Continue bumetanide and amiloride  4.  Continue lactulose and rifaximin  5.  Smoking cessation  6.  Follow-up in 6 months    Rich Little MD  Hepatology  [facility]    Rich Little MD       no

## 2025-01-07 NOTE — PATIENT PROFILE ADULT - FUNCTIONAL ASSESSMENT - DAILY ACTIVITY 2.
Right pedal pulse decreased from Pedal= +2  To pedal=doppler post procedure.   Post. Tib=doppler MD at bedside during assessment.  Will continue to monitor closely. 2 = A lot of assistance

## 2025-01-07 NOTE — PROGRESS NOTE ADULT - PROVIDER SPECIALTY LIST ADULT
Hospitalist
Internal Medicine
Orthopedics
Hospitalist
Internal Medicine
Orthopedics

## 2025-01-07 NOTE — DISCHARGE NOTE NURSING/CASE MANAGEMENT/SOCIAL WORK - NSDCPEEMAIL_GEN_ALL_CORE
[facility] for Tobacco Control email [email or web address]

## 2025-01-07 NOTE — DISCHARGE NOTE NURSING/CASE MANAGEMENT/SOCIAL WORK - PATIENT PORTAL LINK FT
You can access the FollowMyHealth Patient Portal offered by [facility] by registering at the following website: [email or web address]. By joining [employer]’s FollowMyHealth portal, you will also be able to view your health information using other applications (apps) compatible with our system.

## 2025-01-07 NOTE — PROGRESS NOTE ADULT - ASSESSMENT
64 y.o lady presenting for after a fall found to have acute, displaced right femoral neck fracture and acute, nondisplaced right anterior 6-7th rib fracture    #Fall, reportedly mechanical  #Acute, displaced right femoral neck fracture.  #Acute, nondisplaced right anterior 6-7th rib fracture; no pneumothorax.  #Vit D deficiency  - CBC, CMP within normal limits  - OP f up with endocrinology to r/o osteoporosis (osteoporotic fracture)  - start calcium and vitamin D supplementation   - incentive neymar   - pain management:      * tylenol 650 mg [id number] standing     * lidocaine patch     * oxycodone IR 5 mg every 6 hours PRN for moderate pain     * morphine 2 mg IV every 6 hours PRN for severe pain  - bowel regimen   - patient lives alone so likely will need discharge to rehab     #CHB with narrow escape rhythm s/p DC PPM implant SJM  #Paroxysmal A-fib on Eliquis  - ECG: V paced  - EP for device interrogation - short run of a fib 12/31l; device working properly. f/u OP  - resume Eliquis (was taking it outpt)    # Bilateral subsegmental atelectasis.  #Smoking with emphysematous changes   - seen on CT   - incentive neymar    #Smoking (nicotine)  - 1 pack per day   - offered nicotine patch -> didn't want it at this point  - counselled    #DM   - sliding scale    #Diet: DASH, CC; NPO after MN   #DVT pro: SCD LLE,   #GI pro: pantoprazole  #Activity: as tolerated  #Dispo: med  #Code status: full     #Progress Note Handoff  Pending: Clinical improvement and stability__x___PT____x_  Pt/Family discussion: Pt/family informed and agree with the current plan  Disposition:  Nursing Home vs 4A To be determined____x____    My note supersedes the residents note should a discrepancy arise.    Chart and notes personally reviewed.  Care Discussed with Consultants/Other Providers/ Housestaff [ x] YES [ ] NO   Radiology, labs, old records personally reviewed.    discussed w/ housestaff, nursing, case management    Time-based billing (NON-critical care).     35 minutes spent on total encounter. The necessity of the time spent during the encounter on this date of service was due to:     time spent on review of labs, imaging studies, old records, obtaining history, personally examining patient, counselling and communicating with patient/ family, entering orders for medications/tests/etc, discussions with other health care providers, documentation in electronic health records, independent interpretation of labs, imaging/procedure results and care coordination.    
Anesthesia POST Procedure Evaluation    Patient: Francis Flores   MRN:     [id number] Gender:   female   Age:    54 year old :      1965        Preoperative Diagnosis: Pituitary-Dependent Cushing's Disease   Procedure(s):  Stealth Assisted Endoscopic Transnasal Resection Of Pituitary Tumor   Postop Comments: No value filed.       Anesthesia Type:  General    Reportable Event: NO     PAIN: Uncomplicated   Sign Out status: Comfortable, Well controlled pain     PONV: No PONV   Sign Out status:  No Nausea or Vomiting     Neuro/Psych: Uneventful perioperative course   Sign Out Status: Preoperative baseline; Age appropriate mentation     Airway/Resp.: Uneventful perioperative course   Sign Out Status: Non labored breathing, age appropriate RR; Resp. Status within EXPECTED Parameters     CV: Uneventful perioperative course   Sign Out status: Appropriate BP and perfusion indices; Appropriate HR/Rhythm     Disposition:   Sign Out in:  PACU  Disposition:  Phase II; Home  Recovery Course: Uneventful  Follow-Up: Not required     Comments/Narrative:  MAP's 60-65 and pt asymptomatic.  Discussed with Neuro sx and we are ok tieh MAP's greater than 60            Last Anesthesia Record Vitals:  CRNA VITALS  3/25/2019 1304 - 3/25/2019 1404      3/25/2019             Pulse:  69    SpO2:  100 %          Last PACU/Preop Vitals:  Vitals:    19 1500 19 1515 19 1530   BP: (!) 82/49 (!) 77/50 (!) 79/49   Pulse: 67  67   Resp:      Temp:      SpO2: 100% 99% 98%         Electronically Signed By: Crystal Calvo MD, 2019, 4:22 PM  
64 y.o lady presenting for after a fall found to have acute, displaced right femoral neck fracture and acute, nondisplaced right anterior 6-7th rib fracture    #Fall, reportedly mechanical  #Acute, displaced right femoral neck fracture.  #Acute, nondisplaced right anterior 6-7th rib fracture; no pneumothorax.  #Vit D deficiency  - CBC, CMP within normal limits  - OP f up with endocrinology to r/o osteoporosis (osteoporotic fracture)  - start calcium and vitamin D supplementation   - incentive neymar   - pain management:      * tylenol 650 mg [id number] standing     * lidocaine patch     * oxycodone IR 5 mg every 6 hours PRN for moderate pain     * morphine 2 mg IV every 6 hours PRN for severe pain  - bowel regimen   - patient lives alone so likely will need discharge to rehab   -Vit D supplementation    #CHB with narrow escape rhythm s/p DC PPM implant SJM  #Paroxysmal A-fib on Eliquis  - ECG: V paced  - EP for device interrogation - short run of a fib 12/31l; device working properly. f/u OP  - resumed Eliquis (was taking it outpt)    # Bilateral subsegmental atelectasis.  #Smoking with emphysematous changes   - seen on CT   - incentive neymar    #Smoking (nicotine)  - 1 pack per day   - offered nicotine patch -> didn't want it at this point  - counselled    #DM   - Insulin    #Diet: DASH, CC; NPO after MN   #DVT pro: SCD LLE,   #GI pro: pantoprazole  #Activity: as tolerated  #Dispo: med  #Code status: full     Dispo: 4A    My note supersedes the residents note should a discrepancy arise.    Chart and notes personally reviewed.  Care Discussed with Consultants/Other Providers/ Housestaff [ x] YES [ ] NO   Radiology, labs, old records personally reviewed.    discussed w/ housestaff, nursing, case management    Time-based billing (NON-critical care).     35 minutes spent on total encounter. The necessity of the time spent during the encounter on this date of service was due to:     time spent on review of labs, imaging studies, old records, obtaining history, personally examining patient, counselling and communicating with patient/ family, entering orders for medications/tests/etc, discussions with other health care providers, documentation in electronic health records, independent interpretation of labs, imaging/procedure results and care coordination.    
64 y.o lady presenting for after a fall found to have acute, displaced right femoral neck fracture and acute, nondisplaced right anterior 6-7th rib fracture    #Fall, reportedly mechanical  #Acute, displaced right femoral neck fracture.  #Acute, nondisplaced right anterior 6-7th rib fracture; no pneumothorax.  #Vit D deficiency  - CBC, CMP within normal limits  - OP f up with endocrinology to r/o osteoporosis (osteoporotic fracture)  - start calcium and vitamin D supplementation   - incentive neymar   - pain management:      * tylenol 650 mg [id number] standing     * lidocaine patch     * oxycodone IR 5 mg every 6 hours PRN for moderate pain     * morphine 2 mg IV every 6 hours PRN for severe pain  - bowel regimen   - NPO for OR today  - D5W/LR 50 cc per hour   - DVT: SCD LLE; one dose of lovenox 40 mg; re-dose tomorrow if cleared by ortho  - patient lives alone so likely will need discharge to rehab   - Ortho for repair today; f/u post op care, resume AC once ok w/ ortho  - PT after repair    #CHB with narrow escape rhythm s/p DC PPM implant SJM  #Paroxysmal A-fib on Eliquis  - ECG: V paced  - EP for device interrogation - short run of a fib 12/31l; device working properly. f/u OP    # Bilateral subsegmental atelectasis.  #Smoking with emphysematous changes   - seen on CT   - incentive neymar    #Smoking (nicotine)  - 1 pack per day   - offered nicotine patch -> didn't want it at this point  - counselled    #DM   - sliding scale    #Diet: DASH, CC; NPO after MN   #DVT pro: SCD LLE,   #GI pro: pantoprazole  #Activity: as tolerated  #Dispo: med  #Code status: full     #Progress Note Handoff  Pending: Clinical improvement and stability__x___PT____x___OR_  Pt/Family discussion: Pt/family informed and agree with the current plan  Disposition:  Nursing Home    To be determined____x____    My note supersedes the residents note should a discrepancy arise.    Chart and notes personally reviewed.  Care Discussed with Consultants/Other Providers/ Housestaff [ x] YES [ ] NO   Radiology, labs, old records personally reviewed.    discussed w/ housestaff, nursing, case management    Attestation Statements:    Attestation Statements:  Risk Statement (NON-critical care).     On this date of service, level of risk to patient is considered: High.     Due to: pt with illness causing risk to life or bodily fxn    Time-based billing (NON-critical care).     50 minutes spent on total encounter. The necessity of the time spent during the encounter on this date of service was due to:     time spent on review of labs, imaging studies, old records, obtaining history, personally examining patient, counselling and communicating with patient/ family, entering orders for medications/tests/etc, discussions with other health care providers, documentation in electronic health records, independent interpretation of labs, imaging/procedure results and care coordination.    
s/p ORIF for right hip fracture  rib fracture also on right  h/o complete heart block s/p pacemaker now in NSR  episode hypotension, likely related to poor intake  psych history  of depression  tobacco smoker   type 2 DM, managed at home with oral agents    Plan:  PT   evaluated possible good candidate for acute rehab, papers submitted  resume usual anticoagulation - patient's home meds did not include apixaban, and pacemaker placed for complete heart block will give preferred anticoagulation recommended by ortho - aspirin twice daily  change calcium acetate (used as phosphate binder) with calcium carbonate to NOT be given with meals for maximal absorption  continue current dose vitm D  encourage tobacco cessation - patient declines NRT  monitor BP after 1 liter IVF, encourage increased po intake  BS well controlled just with coverage, will continue for now as is.  if goes to rehab will resume oral agent

## 2025-01-08 LAB
ALBUMIN SERPL ELPH-MCNC: 3.3 G/DL — LOW (ref 3.5–5.2)
ALP SERPL-CCNC: 68 U/L — SIGNIFICANT CHANGE UP (ref 30–115)
ALT FLD-CCNC: 19 U/L — SIGNIFICANT CHANGE UP (ref 0–41)
ANION GAP SERPL CALC-SCNC: 13 MMOL/L — SIGNIFICANT CHANGE UP (ref 7–14)
AST SERPL-CCNC: 21 U/L — SIGNIFICANT CHANGE UP (ref 0–41)
BASOPHILS # BLD AUTO: 0.04 K/UL — SIGNIFICANT CHANGE UP (ref 0–0.2)
BASOPHILS NFR BLD AUTO: 0.4 % — SIGNIFICANT CHANGE UP (ref 0–1)
BILIRUB SERPL-MCNC: 0.5 MG/DL — SIGNIFICANT CHANGE UP (ref 0.2–1.2)
BUN SERPL-MCNC: 16 MG/DL — SIGNIFICANT CHANGE UP (ref 10–20)
CALCIUM SERPL-MCNC: 8.6 MG/DL — SIGNIFICANT CHANGE UP (ref 8.4–10.5)
CHLORIDE SERPL-SCNC: 105 MMOL/L — SIGNIFICANT CHANGE UP (ref 98–110)
CO2 SERPL-SCNC: 25 MMOL/L — SIGNIFICANT CHANGE UP (ref 17–32)
CREAT SERPL-MCNC: 0.5 MG/DL — LOW (ref 0.7–1.5)
EGFR: 105 ML/MIN/1.73M2 — SIGNIFICANT CHANGE UP
EOSINOPHIL # BLD AUTO: 0.23 K/UL — SIGNIFICANT CHANGE UP (ref 0–0.7)
EOSINOPHIL NFR BLD AUTO: 2.4 % — SIGNIFICANT CHANGE UP (ref 0–8)
GLUCOSE BLDC GLUCOMTR-MCNC: 138 MG/DL — HIGH (ref 70–99)
GLUCOSE BLDC GLUCOMTR-MCNC: 177 MG/DL — HIGH (ref 70–99)
GLUCOSE BLDC GLUCOMTR-MCNC: 189 MG/DL — HIGH (ref 70–99)
GLUCOSE BLDC GLUCOMTR-MCNC: 204 MG/DL — HIGH (ref 70–99)
GLUCOSE SERPL-MCNC: 148 MG/DL — HIGH (ref 70–99)
HCT VFR BLD CALC: 37.3 % — SIGNIFICANT CHANGE UP (ref 37–47)
HGB BLD-MCNC: 12.4 G/DL — SIGNIFICANT CHANGE UP (ref 12–16)
IMM GRANULOCYTES NFR BLD AUTO: 1.8 % — HIGH (ref 0.1–0.3)
LYMPHOCYTES # BLD AUTO: 2.1 K/UL — SIGNIFICANT CHANGE UP (ref 1.2–3.4)
LYMPHOCYTES # BLD AUTO: 21.5 % — SIGNIFICANT CHANGE UP (ref 20.5–51.1)
MAGNESIUM SERPL-MCNC: 1.9 MG/DL — SIGNIFICANT CHANGE UP (ref 1.8–2.4)
MCHC RBC-ENTMCNC: 32.1 PG — HIGH (ref 27–31)
MCHC RBC-ENTMCNC: 33.2 G/DL — SIGNIFICANT CHANGE UP (ref 32–37)
MCV RBC AUTO: 96.6 FL — SIGNIFICANT CHANGE UP (ref 81–99)
MONOCYTES # BLD AUTO: 0.76 K/UL — HIGH (ref 0.1–0.6)
MONOCYTES NFR BLD AUTO: 7.8 % — SIGNIFICANT CHANGE UP (ref 1.7–9.3)
NEUTROPHILS # BLD AUTO: 6.47 K/UL — SIGNIFICANT CHANGE UP (ref 1.4–6.5)
NEUTROPHILS NFR BLD AUTO: 66.1 % — SIGNIFICANT CHANGE UP (ref 42.2–75.2)
NRBC # BLD: 0 /100 WBCS — SIGNIFICANT CHANGE UP (ref 0–0)
PLATELET # BLD AUTO: 325 K/UL — SIGNIFICANT CHANGE UP (ref 130–400)
PMV BLD: 9.6 FL — SIGNIFICANT CHANGE UP (ref 7.4–10.4)
POTASSIUM SERPL-MCNC: 4.4 MMOL/L — SIGNIFICANT CHANGE UP (ref 3.5–5)
POTASSIUM SERPL-SCNC: 4.4 MMOL/L — SIGNIFICANT CHANGE UP (ref 3.5–5)
PROT SERPL-MCNC: 6 G/DL — SIGNIFICANT CHANGE UP (ref 6–8)
RBC # BLD: 3.86 M/UL — LOW (ref 4.2–5.4)
RBC # FLD: 13.2 % — SIGNIFICANT CHANGE UP (ref 11.5–14.5)
SODIUM SERPL-SCNC: 143 MMOL/L — SIGNIFICANT CHANGE UP (ref 135–146)
WBC # BLD: 9.78 K/UL — SIGNIFICANT CHANGE UP (ref 4.8–10.8)
WBC # FLD AUTO: 9.78 K/UL — SIGNIFICANT CHANGE UP (ref 4.8–10.8)

## 2025-01-08 RX ORDER — FLUPHENAZINE HYDROCHLORIDE 1 MG/1
125 TABLET, FILM COATED ORAL
Refills: 0 | DISCHARGE

## 2025-01-08 RX ORDER — EZETIMIBE 10 MG/1
1 TABLET ORAL
Refills: 0 | DISCHARGE

## 2025-01-08 RX ORDER — APIXABAN 5 MG/1
1 TABLET, FILM COATED ORAL
Refills: 0 | DISCHARGE

## 2025-01-08 RX ORDER — GINKGO BILOBA 40 MG
1 CAPSULE ORAL
Refills: 0 | DISCHARGE

## 2025-01-08 RX ADMIN — EZETIMIBE 10 MILLIGRAM(S): 10 TABLET ORAL at 11:46

## 2025-01-08 RX ADMIN — CHLORHEXIDINE GLUCONATE 1 APPLICATION(S): 1.2 RINSE ORAL at 05:54

## 2025-01-08 RX ADMIN — APIXABAN 5 MILLIGRAM(S): 5 TABLET, FILM COATED ORAL at 17:25

## 2025-01-08 RX ADMIN — CALCIUM CARBONATE 1 TABLET(S): 750 TABLET, CHEWABLE ORAL at 17:25

## 2025-01-08 RX ADMIN — Medication 17 GRAM(S): at 05:54

## 2025-01-08 RX ADMIN — Medication 17 GRAM(S): at 17:26

## 2025-01-08 RX ADMIN — CARVEDILOL 3.12 MILLIGRAM(S): 25 TABLET, FILM COATED ORAL at 17:25

## 2025-01-08 RX ADMIN — CARVEDILOL 3.12 MILLIGRAM(S): 25 TABLET, FILM COATED ORAL at 05:54

## 2025-01-08 RX ADMIN — APIXABAN 5 MILLIGRAM(S): 5 TABLET, FILM COATED ORAL at 05:53

## 2025-01-08 NOTE — PROGRESS NOTE ADULT - ASSESSMENT
63 yo female with PMHx of Afib on eliquis, HTN, DM, CHB wiht PPM, schizophrenia who presented to the ED on 01/02/25 after an unwitnessed fall with right femoral neck fracture on imaging s/p R hip hemiarthroplasty on 1/3. Admitted to rehab for mulittrauama s/p R hip hemiarthroplasty     #Rehab of right femoral neck fracture 2/2 mechanical fall s/p R hip hemiarthroplasty with  gait abnormality and decline in function  #Right anterior 6-7th rib fx   #Multiple BLE abrasions  - CT Chest: Acute, displaced right femoral neck fracture; Acute, nondisplaced right anterior 6-7th rib fracture; no pneumothorax.  - WBAT RLE  -Pain control with tylenol prn and oxycodone 5mg prn    -PT/OT Eval and treat: patient requires 3 hrs daily of interdisciplinary inpatient rehab at least 5 days a week.     #Vitamin D deficiency   - continue with calcium and vitamin D supplementation     #Paroxysmal Afib on eliquis  #CHB with PPM  -evaluated by electrophysiology on admission: follow up outpatient  - continue with home med Eliquis     #HTN  -continue with coreg 3.125mg bid   -monitor BP and adjust medication as needed    #pre-diabates  -A1C 6.1  -patient understand the benefits of glycemic control and the risks of hyperglycemia and uncontrolled diabates however at this point in time she delcines treatment for DM and hyperglycemia, does not want insulin.     #HLD  -continue with lipitor 80mg qhs  -continue with ezetimibe 10mg daily     #tobacco dependence   -1PPD for past 40 years  - declining nicotine patch at this time   - offer counseling     -Pain control: prn tylenol and prn oxycodone     -GI/Bowel Mgmt: miralax bid      -Skin:  No active issues at this time      - Diet: DASH/ CC    Precautions / PROPHYLAXIS:      - Falls    - Ortho: Weight bearing status: WBAT RLE      - DVT prophylaxis:Eliquis         63 yo female with PMHx of Afib on eliquis, HTN, DM, CHB wiht PPM, schizophrenia who presented to the ED on 01/02/25 after an unwitnessed fall with right femoral neck fracture on imaging s/p R hip hemiarthroplasty on 1/3. Admitted to rehab for mulittrauama s/p R hip hemiarthroplasty     #Rehab of right femoral neck fracture 2/2 mechanical fall s/p R hip hemiarthroplasty with  gait abnormality and decline in function  #Right anterior 6-7th rib fx   #Multiple BLE abrasions  - CT Chest: Acute, displaced right femoral neck fracture; Acute, nondisplaced right anterior 6-7th rib fracture; no pneumothorax.  - WBAT RLE  -Pain control with tylenol prn and oxycodone 5mg prn    -PT/OT Eval and treat: patient requires 3 hrs daily of interdisciplinary inpatient rehab at least 5 days a week.     #Vitamin D deficiency   - continue with calcium and vitamin D supplementation     #Paroxysmal Afib on eliquis  #CHB with PPM  -evaluated by electrophysiology on admission: follow up outpatient  - continue with home med Eliquis     #HTN  -continue with coreg 3.125mg bid   -monitor BP and adjust medication as needed    prediabetes  -A1C 6.1  -patient understand the benefits of glycemic control and the risks of hyperglycemia and uncontrolled diabates however at this point in time she delcines treatment for DM and hyperglycemia, does not want insulin.     #HLD  -continue with lipitor 80mg qhs  -continue with ezetimibe 10mg daily     #tobacco dependence   -1PPD for past 40 years  - declining nicotine patch at this time   - offer counseling     -Pain control: prn tylenol and prn oxycodone     -GI/Bowel Mgmt: miralax bid      -Skin:  No active issues at this time      - Diet: DASH/ CC    Precautions / PROPHYLAXIS:      - Falls    - Ortho: Weight bearing status: WBAT RLE      - DVT prophylaxis: Eliquis

## 2025-01-08 NOTE — PROGRESS NOTE ADULT - SUBJECTIVE AND OBJECTIVE BOX
Patient is a 64y old  Female who presents with a chief complaint of Rehab of Multiple trauma/ right hip fracture (07 Jan 2025 13:07)      HPI:  Patient is a 63 yo female with PMHx of Afib on eliquis, HTN, DM, CHB wiht PPM, schizophrenia who presented to the ED on 01/02/25 after an unwitnessed fall with no head strike and no LOC. Patient was found on the ground outside of her apartment for an unknown period of time by her   who was there to  the patient for a PCP visit. Denied dizziness, tachycardia, diaphoresis prior to fall. Post- fall patient endorsed right hip pain, Of note, patient suffered a ground level fall 1 week prior 2/2 LE weakness however eloped prior to receiving medical tx. Imaging in ED showed:    CT HEAD:  No acute intracranial findings.  Stable mild-moderate chronic microvascular ischemic changes.    CT CERVICAL SPINE:  No acute cervical fracture or facet subluxation.    CT abdomen and pelvis w IV contrast:  Acute, displaced right femoral neck fracture.  Acute, nondisplaced right anterior 6-7th rib fracture; no pneumothorax.    XRAY tibia fibula:  No acute displaced fracture. Osteopenia.    Patient was evaluated by orthopedics and is s/p R hip hemiarthroplasty on 1/3 and WBAT RLE.     Prior to admission, the patient was independent in ADLs, had assistance from family and friends for iADLs, and ambulated without an assistive device. Patient now requires Vanessa for bed mobility, Vanessa for sit-stand transfer, ambulate 5' x2 Vansesa with RW, modA for bathing, CGA for UBD, maxA for LBD, CGA for toileting 2/2 to R hip hemiarthroplasty. Therefore, there is a significant functional decline from baseline. Patients also with medical comorbidities of Afib, HTN, DM requiring medication management and monitoring of vitals by Physiatry team and nursing. There are significant comorbidities which warrants acute rehab hospitalization. To return home it is in the patient’s best interest to have acute inpatient rehabilitative services to undergo intensive interdisciplinary therapy. Of note, the patient lives alone and would have difficulty performing ADLs and iADLs individually. Furthermore, the patient is motivated and is able to tolerate up to 3 hours of daily therapy for 5-6 days a week for a total of 15 hours a week. Evaluated by Physiatry and deemed to be an excellent candidate for admission to  for acute inpatient rehab. Admitted to Acute Rehab on 1/7/25.     LS: lives alone in sixth floor apt with elevator access and 1 MITCHELL  PLOF: Independent in ADLs. Assistance from family/friends for iADLs. Cane for ambulation.      (07 Jan 2025 13:07)      I examined the patient and reviewed the chart. There have been no significant changes since my history and physical except where documented below.    TODAY'S SUBJECTIVE & REVIEW OF SYMPTOMS:    No acute overnight events. Per nursing she declined her statin and insulin. patient understand the benefits of glycemic control and the risks of hyperglycemia and uncontrolled diabates however at this point in time she delcines treatment for DM and hyperglycemia, does not want insulin. R hip pain is well controlled at rest. No acute complaints. VSS. Bowel and bladder continent.     ROS:     Constiutional:    [  x ] WNL           [   ] poor appetite   [   ] insomnia   [   ] tired   Cardio:                [ x  ] WNL           [   ] CP   [   ] WARD   [   ] palpitations               Resp:                   [ x  ] WNL           [   ] SOB   [   ] cough   [   ] wheezing   GI:                        [ x  ] WNL           [   ] constipation   [   ] diarrhea   [   ] abdominal pain   [   ] nausea   [   ] emesis                                :                      [x   ] WNL           [   ] PATEL  [   ] dysuria   [   ] difficulty voiding             Endo:                   [x   ] WNL          [   ] po;yuria   [   ] temperature intolerance                 Skin:                     [ x  ] WNL          [   ] pain   [   ] wound   [   ] rash   MSK:                    [   ] WNL          [   ] muscle pain   [  x ] joint pain/ stiffness   [x   ] muscle tenderness right hip   [   ] swelling   Neuro:                 [x   ] WNL          [   ] HA   [   ] change in vision   [   ] tremor   [   ] weakness   [   ]dysphagia              Cognitive:           [ x  ] WNL           [   ]confusion      Psych:                  [ x  ] WNL           [   ] hallucinations   [   ]agitation   [   ] delusion   [   ]depression      PHYSICAL EXAM    Vital Signs Last 24 Hrs  T(C): 36.7 (07 Jan 2025 21:48), Max: 36.8 (07 Jan 2025 12:59)  T(F): 98 (07 Jan 2025 21:48), Max: 98.3 (07 Jan 2025 12:59)  HR: 80 (08 Jan 2025 06:24) (63 - 80)  BP: 150/74 (08 Jan 2025 06:24) (150/74 - 171/73)  BP(mean): 101 (07 Jan 2025 13:18) (101 - 101)  RR: 18 (07 Jan 2025 21:48) (16 - 18)  SpO2: 95% (07 Jan 2025 21:48) (95% - 95%)    Parameters below as of 07 Jan 2025 21:48  Patient On (Oxygen Delivery Method): room air        Physical Exam: PHYSICAL EXAMINATION   VItals: T(C): 36.8 (01-07-25 @ 12:59), Max: 36.8 (01-07-25 @ 12:59)  HR: 72 (01-07-25 @ 13:18) (63 - 72)  BP: 164/70 (01-07-25 @ 13:18) (132/76 - 171/73)  RR: 16 (01-07-25 @ 12:59) (16 - 18)  SpO2: 96% (01-07-25 @ 05:54) (96% - 97%)    General:[  x ] NAD, Resting Comfortable,   [   ] other:                                HEENT: [ x  ] NC/AT, EOMI, PERRL , Normal Conjunctivae,   [   ] other:  Cardio: [  x ] RRR, no murmer,   [   ] other:                              Pulm: [ x  ] No Respiratory Distress,  Lungs CTAB,   [   ] other:                       Abdomen: [ x  ]ND/NT, Soft,   [   ] other:    : [ x  ] NO PATEL CATHETER, [   ] PATEL CATHETER- no meatal tear, no discharge, [   ] other:                                            MSK: [   ] No joint swelling, Full ROM,   [x   ] other:   Mild TTP of R hip, limited ROM 2/2 pain and s/p hemiarthroplasty                                       Ext: [ x  ]No C/C/E, No calf tenderness,   [   ]other:    Skin: [   ]intact,   [   x] other: few b/l LE abrasions/scabs                                                                  Neurological Examination:  Cognitive: [ x   ] AAO x 3,   [    ]  other:                                                                      Attention:  [ x   ] intact,   [    ]  other:                               Mood/Affect: [ x   ] wnl,    [    ]  other:                                                                             Communication: [ x   ]Fluent, no dysarthria, following commands:  [    ] other:   CN II - XII:  [ x   ] intact,  [    ] other:                                                                                        Motor:   RIGHT UE: [  x ] WNL,  [   ] other:  LEFT    UE: [  x ] WNL,  [   ] other:  RIGHT LE: [   ] WNL,  [ x  ] other:  3-/5 except distally PF/DF 4+/5  LEFT    LE: [x   ] WNL,  [   ] other:    Tone: [  x  ] wnl,   [    ]  other:  DTRs: [  x ]symmetric, [   ] other:                                                                           Sensory: [   x ] Intact to light touch,   [    ] other:      acetaminophen     Tablet .. 650 milliGRAM(s) Oral every 6 hours PRN  apixaban 5 milliGRAM(s) Oral two times a day  atorvastatin 80 milliGRAM(s) Oral at bedtime  calcium carbonate   1250 mG (OsCal) 1 Tablet(s) Oral two times a day  carvedilol 3.125 milliGRAM(s) Oral every 12 hours  chlorhexidine 2% Cloths 1 Application(s) Topical <User Schedule>  dextrose 5%. 1000 milliLiter(s) IV Continuous <Continuous>  dextrose 5%. 1000 milliLiter(s) IV Continuous <Continuous>  dextrose 50% Injectable 25 Gram(s) IV Push once  dextrose 50% Injectable 12.5 Gram(s) IV Push once  dextrose 50% Injectable 25 Gram(s) IV Push once  dextrose Oral Gel 15 Gram(s) Oral once PRN  ezetimibe 10 milliGRAM(s) Oral daily  furosemide    Tablet 40 milliGRAM(s) Oral daily PRN  glucagon  Injectable 1 milliGRAM(s) IntraMuscular once  melatonin 10 milliGRAM(s) Oral at bedtime  oxyCODONE    IR 5 milliGRAM(s) Oral every 6 hours PRN  polyethylene glycol 3350 17 Gram(s) Oral two times a day  senna 2 Tablet(s) Oral at bedtime PRN      RECENT LABS/IMAGING                        12.4   9.78  )-----------( 325      ( 08 Jan 2025 07:08 )             37.3     01-08    143  |  105  |  16  ----------------------------<  148[H]  4.4   |  25  |  0.5[L]    Ca    8.6      08 Jan 2025 07:08  Mg     1.9     01-08    TPro  6.0  /  Alb  3.3[L]  /  TBili  0.5  /  DBili  x   /  AST  21  /  ALT  19  /  AlkPhos  68  01-08      Urinalysis Basic - ( 08 Jan 2025 07:08 )    Color: x / Appearance: x / SG: x / pH: x  Gluc: 148 mg/dL / Ketone: x  / Bili: x / Urobili: x   Blood: x / Protein: x / Nitrite: x   Leuk Esterase: x / RBC: x / WBC x   Sq Epi: x / Non Sq Epi: x / Bacteria: x       Patient is a 64y old  Female who presents with a chief complaint of Rehab of Multiple trauma/ right hip fracture (07 Jan 2025 13:07)      HPI:  Patient is a 63 yo female with PMHx of Afib on eliquis, HTN, DM, CHB wiht PPM, schizophrenia who presented to the ED on 01/02/25 after an unwitnessed fall with no head strike and no LOC. Patient was found on the ground outside of her apartment for an unknown period of time by her   who was there to  the patient for a PCP visit. Denied dizziness, tachycardia, diaphoresis prior to fall. Post- fall patient endorsed right hip pain, Of note, patient suffered a ground level fall 1 week prior 2/2 LE weakness however eloped prior to receiving medical tx. Imaging in ED showed:    CT HEAD:  No acute intracranial findings.  Stable mild-moderate chronic microvascular ischemic changes.    CT CERVICAL SPINE:  No acute cervical fracture or facet subluxation.    CT abdomen and pelvis w IV contrast:  Acute, displaced right femoral neck fracture.  Acute, nondisplaced right anterior 6-7th rib fracture; no pneumothorax.    XRAY tibia fibula:  No acute displaced fracture. Osteopenia.    Patient was evaluated by orthopedics and is s/p R hip hemiarthroplasty on 1/3 and WBAT RLE.     Prior to admission, the patient was independent in ADLs, had assistance from family and friends for iADLs, and ambulated without an assistive device. Patient now requires Vanessa for bed mobility, Vanessa for sit-stand transfer, ambulate 5' x2 Vanessa with RW, modA for bathing, CGA for UBD, maxA for LBD, CGA for toileting 2/2 to R hip hemiarthroplasty. Therefore, there is a significant functional decline from baseline. Patients also with medical comorbidities of Afib, HTN, DM requiring medication management and monitoring of vitals by Physiatry team and nursing. There are significant comorbidities which warrants acute rehab hospitalization. To return home it is in the patient’s best interest to have acute inpatient rehabilitative services to undergo intensive interdisciplinary therapy. Of note, the patient lives alone and would have difficulty performing ADLs and iADLs individually. Furthermore, the patient is motivated and is able to tolerate up to 3 hours of daily therapy for 5-6 days a week for a total of 15 hours a week. Evaluated by Physiatry and deemed to be an excellent candidate for admission to  for acute inpatient rehab. Admitted to Acute Rehab on 1/7/25.     LS: lives alone in sixth floor apt with elevator access and 1 MITCHELL  PLOF: Independent in ADLs. Assistance from family/friends for iADLs. Cane for ambulation.      (07 Jan 2025 13:07)      I examined the patient and reviewed the chart. There have been no significant changes since my history and physical except where documented below.    TODAY'S SUBJECTIVE & REVIEW OF SYMPTOMS:    No acute overnight events. Per nursing she declined her statin and insulin. patient understand the benefits of glycemic control and the risks of hyperglycemia and uncontrolled diabates however at this point in time she declines treatment for DM and hyperglycemia, does not want insulin. R hip pain is well controlled at rest. No acute complaints. VSS. Bowel and bladder continent.     ROS:     Constiutional:    [  x ] WNL           [   ] poor appetite   [   ] insomnia   [   ] tired   Cardio:                [ x  ] WNL           [   ] CP   [   ] WARD   [   ] palpitations               Resp:                   [ x  ] WNL           [   ] SOB   [   ] cough   [   ] wheezing   GI:                        [ x  ] WNL           [   ] constipation   [   ] diarrhea   [   ] abdominal pain   [   ] nausea   [   ] emesis                                :                      [x   ] WNL           [   ] PATEL  [   ] dysuria   [   ] difficulty voiding             Endo:                   [x   ] WNL          [   ] po;yuria   [   ] temperature intolerance                 Skin:                     [ x  ] WNL          [   ] pain   [   ] wound   [   ] rash   MSK:                    [   ] WNL          [   ] muscle pain   [  x ] joint pain/ stiffness   [x   ] muscle tenderness right hip   [   ] swelling   Neuro:                 [x   ] WNL          [   ] HA   [   ] change in vision   [   ] tremor   [   ] weakness   [   ]dysphagia              Cognitive:           [ x  ] WNL           [   ]confusion      Psych:                  [ x  ] WNL           [   ] hallucinations   [   ]agitation   [   ] delusion   [   ]depression      PHYSICAL EXAM    Vital Signs Last 24 Hrs  T(C): 36.7 (07 Jan 2025 21:48), Max: 36.8 (07 Jan 2025 12:59)  T(F): 98 (07 Jan 2025 21:48), Max: 98.3 (07 Jan 2025 12:59)  HR: 80 (08 Jan 2025 06:24) (63 - 80)  BP: 150/74 (08 Jan 2025 06:24) (150/74 - 171/73)  BP(mean): 101 (07 Jan 2025 13:18) (101 - 101)  RR: 18 (07 Jan 2025 21:48) (16 - 18)  SpO2: 95% (07 Jan 2025 21:48) (95% - 95%)    Parameters below as of 07 Jan 2025 21:48  Patient On (Oxygen Delivery Method): room air        Physical Exam: PHYSICAL EXAMINATION   VItals: T(C): 36.8 (01-07-25 @ 12:59), Max: 36.8 (01-07-25 @ 12:59)  HR: 72 (01-07-25 @ 13:18) (63 - 72)  BP: 164/70 (01-07-25 @ 13:18) (132/76 - 171/73)  RR: 16 (01-07-25 @ 12:59) (16 - 18)  SpO2: 96% (01-07-25 @ 05:54) (96% - 97%)    General:[  x ] NAD, Resting Comfortable,   [   ] other:                                HEENT: [ x  ] NC/AT, EOMI, PERRL , Normal Conjunctivae,   [   ] other:  Cardio: [  x ] RRR, no murmer,   [   ] other:                              Pulm: [ x  ] No Respiratory Distress,  Lungs CTAB,   [   ] other:                       Abdomen: [ x  ]ND/NT, Soft,   [   ] other:    : [ x  ] NO PATEL CATHETER, [   ] PATEL CATHETER- no meatal tear, no discharge, [   ] other:                                            MSK: [   ] No joint swelling, Full ROM,   [x   ] other:   Mild TTP of R hip, limited ROM 2/2 pain and s/p hemiarthroplasty                                       Ext: [ x  ]No C/C/E, No calf tenderness,   [   ]other:    Skin: [   ]intact,   [   x] other: few b/l LE abrasions/scabs                                                                  Neurological Examination:  Cognitive: [ x   ] AAO x 3,   [    ]  other:                                                                      Attention:  [ x   ] intact,   [    ]  other:                               Mood/Affect: [ x   ] wnl,    [    ]  other:                                                                             Communication: [ x   ]Fluent, no dysarthria, following commands:  [    ] other:   CN II - XII:  [ x   ] intact,  [    ] other:                                                                                        Motor:   RIGHT UE: [  x ] WNL,  [   ] other:  LEFT    UE: [  x ] WNL,  [   ] other:  RIGHT LE: [   ] WNL,  [ x  ] other:  3-/5 except distally PF/DF 4+/5  LEFT    LE: [x   ] WNL,  [   ] other:    Tone: [  x  ] wnl,   [    ]  other:  DTRs: [  x ]symmetric, [   ] other:                                                                           Sensory: [   x ] Intact to light touch,   [    ] other:      acetaminophen     Tablet .. 650 milliGRAM(s) Oral every 6 hours PRN  apixaban 5 milliGRAM(s) Oral two times a day  atorvastatin 80 milliGRAM(s) Oral at bedtime  calcium carbonate   1250 mG (OsCal) 1 Tablet(s) Oral two times a day  carvedilol 3.125 milliGRAM(s) Oral every 12 hours  chlorhexidine 2% Cloths 1 Application(s) Topical <User Schedule>  dextrose 5%. 1000 milliLiter(s) IV Continuous <Continuous>  dextrose 5%. 1000 milliLiter(s) IV Continuous <Continuous>  dextrose 50% Injectable 25 Gram(s) IV Push once  dextrose 50% Injectable 12.5 Gram(s) IV Push once  dextrose 50% Injectable 25 Gram(s) IV Push once  dextrose Oral Gel 15 Gram(s) Oral once PRN  ezetimibe 10 milliGRAM(s) Oral daily  furosemide    Tablet 40 milliGRAM(s) Oral daily PRN  glucagon  Injectable 1 milliGRAM(s) IntraMuscular once  melatonin 10 milliGRAM(s) Oral at bedtime  oxyCODONE    IR 5 milliGRAM(s) Oral every 6 hours PRN  polyethylene glycol 3350 17 Gram(s) Oral two times a day  senna 2 Tablet(s) Oral at bedtime PRN      RECENT LABS/IMAGING                        12.4   9.78  )-----------( 325      ( 08 Jan 2025 07:08 )             37.3     01-08    143  |  105  |  16  ----------------------------<  148[H]  4.4   |  25  |  0.5[L]    Ca    8.6      08 Jan 2025 07:08  Mg     1.9     01-08    TPro  6.0  /  Alb  3.3[L]  /  TBili  0.5  /  DBili  x   /  AST  21  /  ALT  19  /  AlkPhos  68  01-08

## 2025-01-09 LAB
GLUCOSE BLDC GLUCOMTR-MCNC: 142 MG/DL — HIGH (ref 70–99)
GLUCOSE BLDC GLUCOMTR-MCNC: 152 MG/DL — HIGH (ref 70–99)
GLUCOSE BLDC GLUCOMTR-MCNC: 179 MG/DL — HIGH (ref 70–99)
GLUCOSE BLDC GLUCOMTR-MCNC: 242 MG/DL — HIGH (ref 70–99)

## 2025-01-09 RX ORDER — SODIUM CHLORIDE 9 MG/ML
500 INJECTION, SOLUTION INTRAMUSCULAR; INTRAVENOUS; SUBCUTANEOUS ONCE
Refills: 0 | Status: COMPLETED | OUTPATIENT
Start: 2025-01-09 | End: 2025-01-09

## 2025-01-09 RX ORDER — SENNOSIDES 8.6 MG/1
2 TABLET, FILM COATED ORAL AT BEDTIME
Refills: 0 | Status: DISCONTINUED | OUTPATIENT
Start: 2025-01-09 | End: 2025-01-16

## 2025-01-09 RX ADMIN — CALCIUM CARBONATE 1 TABLET(S): 750 TABLET, CHEWABLE ORAL at 17:47

## 2025-01-09 RX ADMIN — EZETIMIBE 10 MILLIGRAM(S): 10 TABLET ORAL at 11:53

## 2025-01-09 RX ADMIN — Medication 17 GRAM(S): at 17:47

## 2025-01-09 RX ADMIN — ACETAMINOPHEN 650 MILLIGRAM(S): 80 SOLUTION/ DROPS ORAL at 17:46

## 2025-01-09 RX ADMIN — CHLORHEXIDINE GLUCONATE 1 APPLICATION(S): 1.2 RINSE ORAL at 06:33

## 2025-01-09 RX ADMIN — SODIUM CHLORIDE 250 MILLILITER(S): 9 INJECTION, SOLUTION INTRAMUSCULAR; INTRAVENOUS; SUBCUTANEOUS at 12:08

## 2025-01-09 RX ADMIN — APIXABAN 5 MILLIGRAM(S): 5 TABLET, FILM COATED ORAL at 06:30

## 2025-01-09 RX ADMIN — CARVEDILOL 3.12 MILLIGRAM(S): 25 TABLET, FILM COATED ORAL at 17:47

## 2025-01-09 RX ADMIN — CARVEDILOL 3.12 MILLIGRAM(S): 25 TABLET, FILM COATED ORAL at 06:30

## 2025-01-09 RX ADMIN — CALCIUM CARBONATE 1 TABLET(S): 750 TABLET, CHEWABLE ORAL at 06:30

## 2025-01-09 NOTE — PROGRESS NOTE ADULT - ASSESSMENT
65 yo female with PMHx of Afib on eliquis, HTN, DM, CHB wiht PPM, schizophrenia who presented to the ED on 01/02/25 after an unwitnessed fall with right femoral neck fracture on imaging s/p R hip hemiarthroplasty on 1/3. Admitted to rehab for mulittrauama s/p R hip hemiarthroplasty     #Rehab of right femoral neck fracture 2/2 mechanical fall s/p R hip hemiarthroplasty with  gait abnormality and decline in function  #Right anterior 6-7th rib fx   #Multiple BLE abrasions  - CT Chest: Acute, displaced right femoral neck fracture; Acute, nondisplaced right anterior 6-7th rib fracture; no pneumothorax.  - WBAT RLE  -Pain control with tylenol prn and oxycodone 5mg prn  - continue full rehab program    #Vitamin D deficiency   - continue with calcium and vitamin D supplementation     #Paroxysmal Afib on eliquis  #CHB with PPM  -evaluated by electrophysiology on admission: follow up outpatient  - continue with home med Eliquis   - rate controlled    #HTN  -continue with coreg 3.125mg bid   -monitor BP and adjust medication as needed    prediabetes  -A1C 6.1  -patient understand the benefits of glycemic control and the risks of hyperglycemia and uncontrolled diabates however at this point in time she delcines treatment for DM and hyperglycemia, does not want insulin.     #HLD  -continue with lipitor 80mg qhs  -continue with ezetimibe 10mg daily     #Schizophrenia  - no evidence of active psychosis  - cooperative with therapy, though not always compliant with taking meds.  - monitor, Psych consult prn    #tobacco dependence   -1PPD for past 40 years  - declining nicotine patch at this time   - offer counseling     -Pain control: prn tylenol and prn oxycodone     -GI/Bowel Mgmt: miralax bid      -Skin:  monitor imcision      - Diet: DASH/ CC    Precautions / PROPHYLAXIS:      - Falls    - Ortho: Weight bearing status: WBAT RLE      - DVT prophylaxis: Eliquis

## 2025-01-09 NOTE — PROGRESS NOTE ADULT - SUBJECTIVE AND OBJECTIVE BOX
Patient is a 64y old  Female who presents with a chief complaint of Rehab of Multiple trauma/ right hip fracture (07 Jan 2025 13:07)      HPI:  Patient is a 65 yo female with PMHx of Afib on eliquis, HTN, DM, CHB wiht PPM, schizophrenia who presented to the ED on 01/02/25 after an unwitnessed fall with no head strike and no LOC. Patient was found on the ground outside of her apartment for an unknown period of time by her   who was there to  the patient for a PCP visit. Denied dizziness, tachycardia, diaphoresis prior to fall. Post- fall patient endorsed right hip pain, Of note, patient suffered a ground level fall 1 week prior 2/2 LE weakness however eloped prior to receiving medical tx. Imaging in ED showed:    CT HEAD:  No acute intracranial findings.  Stable mild-moderate chronic microvascular ischemic changes.    CT CERVICAL SPINE:  No acute cervical fracture or facet subluxation.    CT abdomen and pelvis w IV contrast:  Acute, displaced right femoral neck fracture.  Acute, nondisplaced right anterior 6-7th rib fracture; no pneumothorax.    XRAY tibia fibula:  No acute displaced fracture. Osteopenia.    Patient was evaluated by orthopedics and is s/p R hip hemiarthroplasty on 1/3 and WBAT RLE.     Prior to admission, the patient was independent in ADLs, had assistance from family and friends for iADLs, and ambulated without an assistive device. Patient now requires Vanessa for bed mobility, Vanessa for sit-stand transfer, ambulate 5' x2 Vanessa with RW, modA for bathing, CGA for UBD, maxA for LBD, CGA for toileting 2/2 to R hip hemiarthroplasty. Therefore, there is a significant functional decline from baseline. Patients also with medical comorbidities of Afib, HTN, DM requiring medication management and monitoring of vitals by Physiatry team and nursing. There are significant comorbidities which warrants acute rehab hospitalization. To return home it is in the patient’s best interest to have acute inpatient rehabilitative services to undergo intensive interdisciplinary therapy. Of note, the patient lives alone and would have difficulty performing ADLs and iADLs individually. Furthermore, the patient is motivated and is able to tolerate up to 3 hours of daily therapy for 5-6 days a week for a total of 15 hours a week. Evaluated by Physiatry and deemed to be an excellent candidate for admission to  for acute inpatient rehab. Admitted to Acute Rehab on 1/7/25.     LS: lives alone in sixth floor apt with elevator access and 1 MITCHELL  PLOF: Independent in ADLs. Assistance from family/friends for iADLs. Cane for ambulation.      (07 Jan 2025 13:07)      TODAY'S SUBJECTIVE & REVIEW OF SYMPTOMS:    No acute overnight events. Per nursing she declined her statin and insulin. patient understand the benefits of glycemic control and the risks of hyperglycemia and uncontrolled diabates however at this point in time she declines treatment for DM and hyperglycemia, does not want insulin. R hip pain is well controlled at rest. No acute complaints. VSS. Bowel and bladder continent. No new complaints.     ROS:     Constitutional    [  x ] WNL           [   ] poor appetite   [   ] insomnia   [   ] tired   Cardio:                [ x  ] WNL           [   ] CP   [   ] WARD   [   ] palpitations               Resp:                   [ x  ] WNL           [   ] SOB   [   ] cough   [   ] wheezing   GI:                        [ x  ] WNL           [   ] constipation   [   ] diarrhea   [   ] abdominal pain   [   ] nausea   [   ] emesis                                :                      [x   ] WNL           [   ] PATEL  [   ] dysuria   [   ] difficulty voiding             Endo:                   [x   ] WNL          [   ] po;yuria   [   ] temperature intolerance                 Skin:                     [ x  ] WNL          [   ] pain   [   ] wound   [   ] rash   MSK:                    [   ] WNL          [   ] muscle pain   [  x ] joint pain/ stiffness   [x   ] muscle tenderness right hip   [   ] swelling   Neuro:                 [x   ] WNL          [   ] HA   [   ] change in vision   [   ] tremor   [   ] weakness   [   ]dysphagia              Cognitive:           [ x  ] WNL           [   ]confusion      Psych:                  [ x  ] WNL           [   ] hallucinations   [   ]agitation   [   ] delusion   [   ]depression      PHYSICAL EXAM    Vital Signs Last 24 Hrs  T(C): 36.7 (09 Jan 2025 14:25), Max: 36.8 (08 Jan 2025 19:53)  T(F): 98 (09 Jan 2025 14:25), Max: 98.2 (08 Jan 2025 19:53)  HR: 78 (09 Jan 2025 14:25) (75 - 80)  BP: 112/67 (09 Jan 2025 14:25) (112/67 - 155/83)  BP(mean): 107 (08 Jan 2025 19:53) (107 - 107)  RR: 18 (09 Jan 2025 14:25) (18 - 19)  SpO2: 96% (08 Jan 2025 19:53) (96% - 96%)      General:[  x ] NAD, Resting Comfortable,   [   ] other:                                HEENT: [ x  ] NC/AT, EOMI, PERRL , Normal Conjunctivae,   [   ] other:  Cardio: [  x ] RRR, no murmer,   [   ] other:                              Pulm: [ x  ] No Respiratory Distress,  Lungs CTAB,   [   ] other:                       Abdomen: [ x  ]ND/NT, Soft,   [   ] other:    : [ x  ] NO PATEL CATHETER, [   ] PATEL CATHETER- no meatal tear, no discharge, [   ] other:                                            MSK: [   ] No joint swelling, Full ROM,   [x   ] other: R hip, limited ROM 2/2 pain                                    Ext: [ x  ]No C/C/E, No calf tenderness,   [   ]other:    Skin: [   ]intact,   [   x] other: few b/l LE abrasions/scabs                                                                  Neurological Examination:  Cognitive: [ x   ] AAO x 3,   [    ]  other:                                                                      Attention:  [ x   ] intact,   [    ]  other:                               Mood/Affect: [ x   ] wnl,    [    ]  other:                                                                             Communication: [ x   ]Fluent, no dysarthria, following commands:  [    ] other:   CN II - XII:  [ x   ] intact,  [    ] other:                                                                                        Motor:   RIGHT UE: [  x ] WNL,  [   ] other:  LEFT    UE: [  x ] WNL,  [   ] other:  RIGHT LE: [   ] WNL,  [ x  ] other:  3-/5 except distally PF/DF 4+/5  LEFT    LE: [x   ] WNL,  [   ] other:    Tone: [  x  ] wnl,   [    ]  other:  DTRs: [  x ]symmetric, [   ] other:                                                                           Sensory: [   x ] Intact to light touch,   [    ] other:      MEDICATIONS  (STANDING):  apixaban 5 milliGRAM(s) Oral two times a day  atorvastatin 80 milliGRAM(s) Oral at bedtime  calcium carbonate   1250 mG (OsCal) 1 Tablet(s) Oral two times a day  carvedilol 3.125 milliGRAM(s) Oral every 12 hours  chlorhexidine 2% Cloths 1 Application(s) Topical <User Schedule>  dextrose 5%. 1000 milliLiter(s) (100 mL/Hr) IV Continuous <Continuous>  dextrose 5%. 1000 milliLiter(s) (50 mL/Hr) IV Continuous <Continuous>  dextrose 50% Injectable 25 Gram(s) IV Push once  dextrose 50% Injectable 12.5 Gram(s) IV Push once  dextrose 50% Injectable 25 Gram(s) IV Push once  ezetimibe 10 milliGRAM(s) Oral daily  glucagon  Injectable 1 milliGRAM(s) IntraMuscular once  melatonin 10 milliGRAM(s) Oral at bedtime  polyethylene glycol 3350 17 Gram(s) Oral two times a day  senna 2 Tablet(s) Oral at bedtime    MEDICATIONS  (PRN):  acetaminophen     Tablet .. 650 milliGRAM(s) Oral every 6 hours PRN Mild Pain (1 - 3)  dextrose Oral Gel 15 Gram(s) Oral once PRN Blood Glucose LESS THAN 70 milliGRAM(s)/deciliter  furosemide    Tablet 40 milliGRAM(s) Oral daily PRN fluid overload  oxyCODONE    IR 5 milliGRAM(s) Oral every 6 hours PRN Moderate Pain (4 - 6)      RECENT LABS/IMAGING                        12.4   9.78  )-----------( 325      ( 08 Jan 2025 07:08 )             37.3     01-08    143  |  105  |  16  ----------------------------<  148[H]  4.4   |  25  |  0.5[L]    Ca    8.6      08 Jan 2025 07:08  Mg     1.9     01-08    TPro  6.0  /  Alb  3.3[L]  /  TBili  0.5  /  DBili  x   /  AST  21  /  ALT  19  /  AlkPhos  68  01-08

## 2025-01-10 LAB
GLUCOSE BLDC GLUCOMTR-MCNC: 145 MG/DL — HIGH (ref 70–99)
GLUCOSE BLDC GLUCOMTR-MCNC: 149 MG/DL — HIGH (ref 70–99)
GLUCOSE BLDC GLUCOMTR-MCNC: 193 MG/DL — HIGH (ref 70–99)
GLUCOSE BLDC GLUCOMTR-MCNC: 230 MG/DL — HIGH (ref 70–99)
SURGICAL PATHOLOGY STUDY: SIGNIFICANT CHANGE UP

## 2025-01-10 PROCEDURE — 93010 ELECTROCARDIOGRAM REPORT: CPT

## 2025-01-10 RX ADMIN — CARVEDILOL 3.12 MILLIGRAM(S): 25 TABLET, FILM COATED ORAL at 06:50

## 2025-01-10 RX ADMIN — CALCIUM CARBONATE 1 TABLET(S): 750 TABLET, CHEWABLE ORAL at 06:50

## 2025-01-10 RX ADMIN — CHLORHEXIDINE GLUCONATE 1 APPLICATION(S): 1.2 RINSE ORAL at 06:52

## 2025-01-10 RX ADMIN — ERGOCALCIFEROL 50000 UNIT(S): 1.25 CAPSULE ORAL at 12:44

## 2025-01-10 RX ADMIN — CARVEDILOL 3.12 MILLIGRAM(S): 25 TABLET, FILM COATED ORAL at 18:04

## 2025-01-10 NOTE — PROGRESS NOTE ADULT - SUBJECTIVE AND OBJECTIVE BOX
Patient is a 64y old  Female who presents with a chief complaint of Rehab of Multiple trauma/ right hip fracture (07 Jan 2025 13:07)      HPI:  Patient is a 63 yo female with PMHx of Afib on eliquis, HTN, DM, CHB wiht PPM, schizophrenia who presented to the ED on 01/02/25 after an unwitnessed fall with no head strike and no LOC. Patient was found on the ground outside of her apartment for an unknown period of time by her   who was there to  the patient for a PCP visit. Denied dizziness, tachycardia, diaphoresis prior to fall. Post- fall patient endorsed right hip pain, Of note, patient suffered a ground level fall 1 week prior 2/2 LE weakness however eloped prior to receiving medical tx. Imaging in ED showed:    CT HEAD:  No acute intracranial findings.  Stable mild-moderate chronic microvascular ischemic changes.    CT CERVICAL SPINE:  No acute cervical fracture or facet subluxation.    CT abdomen and pelvis w IV contrast:  Acute, displaced right femoral neck fracture.  Acute, nondisplaced right anterior 6-7th rib fracture; no pneumothorax.    XRAY tibia fibula:  No acute displaced fracture. Osteopenia.    Patient was evaluated by orthopedics and is s/p R hip hemiarthroplasty on 1/3 and WBAT RLE.     Prior to admission, the patient was independent in ADLs, had assistance from family and friends for iADLs, and ambulated without an assistive device. Patient now requires Vanessa for bed mobility, Vanessa for sit-stand transfer, ambulate 5' x2 Vanessa with RW, modA for bathing, CGA for UBD, maxA for LBD, CGA for toileting 2/2 to R hip hemiarthroplasty. Therefore, there is a significant functional decline from baseline. Patients also with medical comorbidities of Afib, HTN, DM requiring medication management and monitoring of vitals by Physiatry team and nursing. There are significant comorbidities which warrants acute rehab hospitalization. To return home it is in the patient’s best interest to have acute inpatient rehabilitative services to undergo intensive interdisciplinary therapy. Of note, the patient lives alone and would have difficulty performing ADLs and iADLs individually. Furthermore, the patient is motivated and is able to tolerate up to 3 hours of daily therapy for 5-6 days a week for a total of 15 hours a week. Evaluated by Physiatry and deemed to be an excellent candidate for admission to  for acute inpatient rehab. Admitted to Acute Rehab on 1/7/25.     LS: lives alone in sixth floor apt with elevator access and 1 MITCHELL  PLOF: Independent in ADLs. Assistance from family/friends for iADLs. Cane for ambulation.      (07 Jan 2025 13:07)      TODAY'S SUBJECTIVE & REVIEW OF SYMPTOMS:  No new complaints. Patient reportedly to have brief unresponsive episode in the gym yesterday. Was placed on the mat and responsive with BP syst in 120s and not post-ictal.  Nursing reports that patient refused her evening meds including Eliquis. VSS.   I reenforced the importance of taking her meds, especially Eliquis for stroke risk, and she verbalized understanding.     ROS:     Constitutional    [  x ] WNL           [   ] poor appetite   [   ] insomnia   [   ] tired   Cardio:                [ x  ] WNL           [   ] CP   [   ] WARD   [   ] palpitations               Resp:                   [ x  ] WNL           [   ] SOB   [   ] cough   [   ] wheezing   GI:                        [ x  ] WNL           [   ] constipation   [   ] diarrhea   [   ] abdominal pain   [   ] nausea   [   ] emesis                                :                      [x   ] WNL           [   ] PATEL  [   ] dysuria   [   ] difficulty voiding             Endo:                   [x   ] WNL          [   ] po;yuria   [   ] temperature intolerance                 Skin:                     [ x  ] WNL          [   ] pain   [   ] wound   [   ] rash   MSK:                    [   ] WNL          [   ] muscle pain   [  x ] joint pain/ stiffness   [x   ] muscle tenderness right hip   [   ] swelling   Neuro:                 [x   ] WNL          [   ] HA   [   ] change in vision   [   ] tremor   [   ] weakness   [   ]dysphagia              Cognitive:           [ x  ] WNL           [   ]confusion      Psych:                  [ x  ] WNL           [   ] hallucinations   [   ]agitation   [   ] delusion   [   ]depression      PHYSICAL EXAM    Vital Signs Last 24 Hrs  T(C): 36.7 (10 Jasper 2025 04:45), Max: 36.9 (09 Jan 2025 22:13)  T(F): 98 (10 Jasper 2025 04:45), Max: 98.4 (09 Jan 2025 22:13)  HR: 73 (10 Jasper 2025 04:45) (70 - 78)  BP: 136/74 (10 Jasper 2025 04:45) (112/67 - 136/74)  BP(mean): --  RR: 18 (10 Jasper 2025 04:45) (18 - 18)  SpO2: 98% (10 Jasper 2025 04:45) (92% - 98%)    Parameters below as of 09 Jan 2025 22:13  Patient On (Oxygen Delivery Method): room air    General:[  x ] NAD, Resting Comfortable,   [   ] other:                                HEENT: [ x  ] NC/AT, EOMI, PERRL , Normal Conjunctivae,   [   ] other:  Cardio: [  x ] RRR, no murmer,   [   ] other:                              Pulm: [ x  ] No Respiratory Distress,  Lungs CTAB,   [   ] other:                       Abdomen: [ x  ]ND/NT, Soft,   [   ] other:    : [ x  ] NO PATEL CATHETER, [   ] PATEL CATHETER- no meatal tear, no discharge, [   ] other:                                            MSK: [   ] No joint swelling, Full ROM,   [x   ] other: R hip, limited ROM 2/2 pain                                    Ext: [ x  ]No C/C/E, No calf tenderness,   [   ]other:    Skin: [   ]intact,   [   x] other: few b/l LE abrasions/scabs                                                                  Neurological Examination:  Cognitive: [ x   ] AAO x 3,   [    ]  other:                                                                      Attention:  [ x   ] intact,   [    ]  other:                               Mood/Affect: [ x   ] wnl,    [    ]  other:                                                                             Communication: [ x   ]Fluent, no dysarthria, following commands:  [    ] other:   CN II - XII:  [ x   ] intact,  [    ] other:                                                                                        Motor:   RIGHT UE: [  x ] WNL,  [   ] other:  LEFT    UE: [  x ] WNL,  [   ] other:  RIGHT LE: [   ] WNL,  [ x  ] other:  3-/5 except distally PF/DF 4+/5  LEFT    LE: [x   ] WNL,  [   ] other:    Tone: [  x  ] wnl,   [    ]  other:  DTRs: [  x ]symmetric, [   ] other:                                                                           Sensory: [   x ] Intact to light touch,   [    ] other:      MEDICATIONS  (STANDING):  apixaban 5 milliGRAM(s) Oral two times a day  atorvastatin 80 milliGRAM(s) Oral at bedtime  calcium carbonate   1250 mG (OsCal) 1 Tablet(s) Oral two times a day  carvedilol 3.125 milliGRAM(s) Oral every 12 hours  chlorhexidine 2% Cloths 1 Application(s) Topical <User Schedule>  dextrose 5%. 1000 milliLiter(s) (100 mL/Hr) IV Continuous <Continuous>  dextrose 5%. 1000 milliLiter(s) (50 mL/Hr) IV Continuous <Continuous>  dextrose 50% Injectable 25 Gram(s) IV Push once  dextrose 50% Injectable 12.5 Gram(s) IV Push once  dextrose 50% Injectable 25 Gram(s) IV Push once  ezetimibe 10 milliGRAM(s) Oral daily  glucagon  Injectable 1 milliGRAM(s) IntraMuscular once  melatonin 10 milliGRAM(s) Oral at bedtime  polyethylene glycol 3350 17 Gram(s) Oral two times a day  senna 2 Tablet(s) Oral at bedtime    MEDICATIONS  (PRN):  acetaminophen     Tablet .. 650 milliGRAM(s) Oral every 6 hours PRN Mild Pain (1 - 3)  dextrose Oral Gel 15 Gram(s) Oral once PRN Blood Glucose LESS THAN 70 milliGRAM(s)/deciliter  furosemide    Tablet 40 milliGRAM(s) Oral daily PRN fluid overload  oxyCODONE    IR 5 milliGRAM(s) Oral every 6 hours PRN Moderate Pain (4 - 6)      RECENT LABS/IMAGING                        12.4   9.78  )-----------( 325      ( 08 Jan 2025 07:08 )             37.3     01-08    143  |  105  |  16  ----------------------------<  148[H]  4.4   |  25  |  0.5[L]    Ca    8.6      08 Jan 2025 07:08  Mg     1.9     01-08    TPro  6.0  /  Alb  3.3[L]  /  TBili  0.5  /  DBili  x   /  AST  21  /  ALT  19  /  AlkPhos  68  01-08

## 2025-01-10 NOTE — PROGRESS NOTE ADULT - ASSESSMENT
65 yo female with PMHx of Afib on eliquis, HTN, DM, CHB wiht PPM, schizophrenia who presented to the ED on 01/02/25 after an unwitnessed fall with right femoral neck fracture on imaging s/p R hip hemiarthroplasty on 1/3. Admitted to rehab for mulittrauama s/p R hip hemiarthroplasty     #Rehab of right femoral neck fracture 2/2 mechanical fall s/p R hip hemiarthroplasty with  gait abnormality and decline in function  #Right anterior 6-7th rib fx   #Multiple BLE abrasions  - CT Chest: Acute, displaced right femoral neck fracture; Acute, nondisplaced right anterior 6-7th rib fracture; no pneumothorax.  - WBAT RLE  -Pain control with tylenol prn and oxycodone 5mg prn  - continue full rehab program    #Vitamin D deficiency   - continue with calcium and vitamin D supplementation     #Paroxysmal Afib on eliquis  #CHB with PPM  -evaluated by electrophysiology on admission: follow up outpatient  - continue with home med Eliquis   - rate controlled  - had brief unresponsive episode in chair. Will ask EPS to reinteregate the PPM. Seizure or orthostatic episode less likely. Monitor.    #HTN  -continue with coreg 3.125mg bid   -monitor BP and adjust medication as needed    prediabetes  -A1C 6.1  -patient understand the benefits of glycemic control and the risks of hyperglycemia and uncontrolled diabates however at this point in time she delcines treatment for DM and hyperglycemia, does not want insulin.     #HLD  -continue with lipitor 80mg qhs  -continue with ezetimibe 10mg daily     #Schizophrenia  - no evidence of active psychosis  - cooperative with therapy, though not always compliant with taking meds.  - monitor, Psych consult prn    #tobacco dependence   -1PPD for past 40 years  - declining nicotine patch at this time   - offer counseling     -Pain control: prn tylenol and prn oxycodone     -GI/Bowel Mgmt: miralax bid      -Skin:  monitor imcision      - Diet: DASH/ CC    Precautions / PROPHYLAXIS:      - Falls    - Ortho: Weight bearing status: WBAT RLE      - DVT prophylaxis: Eliquis

## 2025-01-11 LAB
GLUCOSE BLDC GLUCOMTR-MCNC: 144 MG/DL — HIGH (ref 70–99)
GLUCOSE BLDC GLUCOMTR-MCNC: 154 MG/DL — HIGH (ref 70–99)
GLUCOSE BLDC GLUCOMTR-MCNC: 158 MG/DL — HIGH (ref 70–99)
GLUCOSE BLDC GLUCOMTR-MCNC: 171 MG/DL — HIGH (ref 70–99)

## 2025-01-11 PROCEDURE — 93280 PM DEVICE PROGR EVAL DUAL: CPT | Mod: 26

## 2025-01-11 RX ADMIN — CHLORHEXIDINE GLUCONATE 1 APPLICATION(S): 1.2 RINSE ORAL at 06:47

## 2025-01-11 RX ADMIN — CALCIUM CARBONATE 1 TABLET(S): 750 TABLET, CHEWABLE ORAL at 05:49

## 2025-01-11 RX ADMIN — CARVEDILOL 3.12 MILLIGRAM(S): 25 TABLET, FILM COATED ORAL at 05:49

## 2025-01-11 NOTE — PROGRESS NOTE ADULT - SUBJECTIVE AND OBJECTIVE BOX
Patient is a 64y old  Female who presents with a chief complaint of Rehab of Multiple trauma/ right hip fracture (07 Jan 2025 13:07)      HPI:  Patient is a 65 yo female with PMHx of Afib on eliquis, HTN, DM, CHB wiht PPM, schizophrenia who presented to the ED on 01/02/25 after an unwitnessed fall with no head strike and no LOC. Patient was found on the ground outside of her apartment for an unknown period of time by her   who was there to  the patient for a PCP visit. Denied dizziness, tachycardia, diaphoresis prior to fall. Post- fall patient endorsed right hip pain, Of note, patient suffered a ground level fall 1 week prior 2/2 LE weakness however eloped prior to receiving medical tx. Imaging in ED showed:    CT HEAD:  No acute intracranial findings.  Stable mild-moderate chronic microvascular ischemic changes.    CT CERVICAL SPINE:  No acute cervical fracture or facet subluxation.    CT abdomen and pelvis w IV contrast:  Acute, displaced right femoral neck fracture.  Acute, nondisplaced right anterior 6-7th rib fracture; no pneumothorax.    XRAY tibia fibula:  No acute displaced fracture. Osteopenia.    Patient was evaluated by orthopedics and is s/p R hip hemiarthroplasty on 1/3 and WBAT RLE.     Prior to admission, the patient was independent in ADLs, had assistance from family and friends for iADLs, and ambulated without an assistive device. Patient now requires Vanessa for bed mobility, Vanessa for sit-stand transfer, ambulate 5' x2 Vanessa with RW, modA for bathing, CGA for UBD, maxA for LBD, CGA for toileting 2/2 to R hip hemiarthroplasty. Therefore, there is a significant functional decline from baseline. Patients also with medical comorbidities of Afib, HTN, DM requiring medication management and monitoring of vitals by Physiatry team and nursing. There are significant comorbidities which warrants acute rehab hospitalization. To return home it is in the patient’s best interest to have acute inpatient rehabilitative services to undergo intensive interdisciplinary therapy. Of note, the patient lives alone and would have difficulty performing ADLs and iADLs individually. Furthermore, the patient is motivated and is able to tolerate up to 3 hours of daily therapy for 5-6 days a week for a total of 15 hours a week. Evaluated by Physiatry and deemed to be an excellent candidate for admission to  for acute inpatient rehab. Admitted to Acute Rehab on 1/7/25.     LS: lives alone in sixth floor apt with elevator access and 1 MITCHELL  PLOF: Independent in ADLs. Assistance from family/friends for iADLs. Cane for ambulation.      (07 Jan 2025 13:07)      TODAY'S SUBJECTIVE & REVIEW OF SYMPTOMS:  No new complaints. Yesterday patient refused therapy, today stressed importance of compliance. EP interrogated pacemaker given patient's possible syncopal episode 2 days ago, noted pacemaker working properly with no events.   Nursing reports that patient refused her evening meds including Eliquis. VSS.   I reenforced the importance of taking her meds, especially Eliquis for stroke risk, and she verbalized understanding.     ROS:     Constitutional    [  x ] WNL           [   ] poor appetite   [   ] insomnia   [   ] tired   Cardio:                [ x  ] WNL           [   ] CP   [   ] WARD   [   ] palpitations               Resp:                   [ x  ] WNL           [   ] SOB   [   ] cough   [   ] wheezing   GI:                        [ x  ] WNL           [   ] constipation   [   ] diarrhea   [   ] abdominal pain   [   ] nausea   [   ] emesis                                :                      [x   ] WNL           [   ] PATEL  [   ] dysuria   [   ] difficulty voiding             Endo:                   [x   ] WNL          [   ] po;yuria   [   ] temperature intolerance                 Skin:                     [ x  ] WNL          [   ] pain   [   ] wound   [   ] rash   MSK:                    [   ] WNL          [   ] muscle pain   [  x ] joint pain/ stiffness   [x   ] muscle tenderness right hip   [   ] swelling   Neuro:                 [x   ] WNL          [   ] HA   [   ] change in vision   [   ] tremor   [   ] weakness   [   ]dysphagia              Cognitive:           [ x  ] WNL           [   ]confusion      Psych:                  [ x  ] WNL           [   ] hallucinations   [   ]agitation   [   ] delusion   [   ]depression      PHYSICAL EXAM    Vital Signs Last 24 Hrs  Vital Signs Last 24 Hrs  T(C): 36.9 (11 Jan 2025 04:43), Max: 36.9 (11 Jan 2025 04:43)  T(F): 98.5 (11 Jan 2025 04:43), Max: 98.5 (11 Jan 2025 04:43)  HR: 69 (11 Jan 2025 04:43) (69 - 84)  BP: 150/62 (11 Jan 2025 04:43) (131/72 - 150/62)  BP(mean): --  RR: 18 (11 Jan 2025 04:43) (18 - 20)  SpO2: --    General:[  x ] NAD, Resting Comfortable,   [   ] other:                                HEENT: [ x  ] NC/AT, EOMI, PERRL , Normal Conjunctivae,   [   ] other:  Cardio: [  x ] RRR, no murmer,   [   ] other:                              Pulm: [ x  ] No Respiratory Distress,  Lungs CTAB,   [   ] other:                       Abdomen: [ x  ]ND/NT, Soft,   [   ] other:    : [ x  ] NO PATEL CATHETER, [   ] PATEL CATHETER- no meatal tear, no discharge, [   ] other:                                            MSK: [   ] No joint swelling, Full ROM,   [x   ] other: R hip, limited ROM 2/2 pain                                    Ext: [ x  ]No C/C/E, No calf tenderness,   [   ]other:    Skin: [   ]intact,   [   x] other: few b/l LE abrasions/scabs                                                                  Neurological Examination:  Cognitive: [ x   ] AAO x 3,   [    ]  other:                                                                      Attention:  [ x   ] intact,   [    ]  other:                               Mood/Affect: [ x   ] wnl,    [    ]  other:                                                                             Communication: [ x   ]Fluent, no dysarthria, following commands:  [    ] other:   CN II - XII:  [ x   ] intact,  [    ] other:                                                                                        Motor:   RIGHT UE: [  x ] WNL,  [   ] other:  LEFT    UE: [  x ] WNL,  [   ] other:  RIGHT LE: [   ] WNL,  [ x  ] other:  3-/5 except distally PF/DF 4+/5  LEFT    LE: [x   ] WNL,  [   ] other:    Tone: [  x  ] wnl,   [    ]  other:  DTRs: [  x ]symmetric, [   ] other:                                                                           Sensory: [   x ] Intact to light touch,   [    ] other:      MEDICATIONS  (STANDING):  MEDICATIONS  (STANDING):  apixaban 5 milliGRAM(s) Oral two times a day  atorvastatin 80 milliGRAM(s) Oral at bedtime  calcium carbonate   1250 mG (OsCal) 1 Tablet(s) Oral two times a day  carvedilol 3.125 milliGRAM(s) Oral every 12 hours  chlorhexidine 2% Cloths 1 Application(s) Topical <User Schedule>  dextrose 5%. 1000 milliLiter(s) (100 mL/Hr) IV Continuous <Continuous>  dextrose 5%. 1000 milliLiter(s) (50 mL/Hr) IV Continuous <Continuous>  dextrose 50% Injectable 25 Gram(s) IV Push once  dextrose 50% Injectable 12.5 Gram(s) IV Push once  dextrose 50% Injectable 25 Gram(s) IV Push once  ergocalciferol 63044 Unit(s) Oral every week  ezetimibe 10 milliGRAM(s) Oral daily  glucagon  Injectable 1 milliGRAM(s) IntraMuscular once  melatonin 10 milliGRAM(s) Oral at bedtime  polyethylene glycol 3350 17 Gram(s) Oral two times a day  senna 2 Tablet(s) Oral at bedtime    MEDICATIONS  (PRN):  acetaminophen     Tablet .. 650 milliGRAM(s) Oral every 6 hours PRN Mild Pain (1 - 3)  dextrose Oral Gel 15 Gram(s) Oral once PRN Blood Glucose LESS THAN 70 milliGRAM(s)/deciliter  furosemide    Tablet 40 milliGRAM(s) Oral daily PRN fluid overload  oxyCODONE    IR 5 milliGRAM(s) Oral every 6 hours PRN Moderate Pain (4 - 6)      RECENT LABS/IMAGING                        12.4   9.78  )-----------( 325      ( 08 Jan 2025 07:08 )             37.3     01-08    143  |  105  |  16  ----------------------------<  148[H]  4.4   |  25  |  0.5[L]    Ca    8.6      08 Jan 2025 07:08  Mg     1.9     01-08    TPro  6.0  /  Alb  3.3[L]  /  TBili  0.5  /  DBili  x   /  AST  21  /  ALT  19  /  AlkPhos  68  01-08

## 2025-01-11 NOTE — PROGRESS NOTE ADULT - ASSESSMENT
65 yo female with PMHx of Afib on eliquis, HTN, DM, CHB wiht PPM, schizophrenia who presented to the ED on 01/02/25 after an unwitnessed fall with right femoral neck fracture on imaging s/p R hip hemiarthroplasty on 1/3. Admitted to rehab for mulittrauama s/p R hip hemiarthroplasty     #Rehab of right femoral neck fracture 2/2 mechanical fall s/p R hip hemiarthroplasty with  gait abnormality and decline in function  #Right anterior 6-7th rib fx   #Multiple BLE abrasions  - CT Chest: Acute, displaced right femoral neck fracture; Acute, nondisplaced right anterior 6-7th rib fracture; no pneumothorax.  - WBAT RLE  -Pain control with tylenol prn and oxycodone 5mg prn  - continue full rehab program    #Vitamin D deficiency   - continue with calcium and vitamin D supplementation     #Paroxysmal Afib on eliquis  #CHB with PPM  -patient is pacemaker dependent   -evaluated by electrophysiology on admission: follow up outpatient  - continue with home med Eliquis   - rate controlled  - had brief unresponsive episode in chair on 1/9. EPS interrogated the PPM, noted no events. .   possible seizure or orthostatic episode . Monitor.    #HTN  -continue with coreg 3.125mg bid   -monitor BP and adjust medication as needed    prediabetes  -A1C 6.1  -patient understand the benefits of glycemic control and the risks of hyperglycemia and uncontrolled diabates however at this point in time she delcines treatment for DM and hyperglycemia, does not want insulin.     #HLD  -continue with lipitor 80mg qhs  -continue with ezetimibe 10mg daily     #Schizophrenia  - no evidence of active psychosis  - cooperative with therapy, though not always compliant with taking meds.  - monitor, Psych consult prn    #tobacco dependence   -1PPD for past 40 years  - declining nicotine patch at this time   - offer counseling     -Pain control: prn tylenol and prn oxycodone     -GI/Bowel Mgmt: miralax bid      -Skin:  monitor imcision      - Diet: DASH/ CC    Precautions / PROPHYLAXIS:      - Falls    - Ortho: Weight bearing status: WBAT RLE      - DVT prophylaxis: Eliquis

## 2025-01-11 NOTE — CHART NOTE - NSCHARTNOTEFT_GEN_A_CORE
NITIN DEL TORO  64y  Female      Patient is a 64y old  Female who presents with a chief complaint of Rehab of Multiple trauma/ right hip fracture (08 Jan 2025 12:29)      INTERVAL HPI/ EVENTS: called to rehab gym, per PT working with patient, pt became diaphoretic. Upon arrival, pt was sitting in her wheelchair, alert, oriented x3, Blood glucose upon arrival 242mg/dL, Pulse 82, R 20 Unable to obtain BP with automatic device. Pt was brought back to her room, placed into Trendelenburg, and at that time /71 P 72 R 20 Denies any chest pain. Gentle hydration Initiated 500mL Bolus over 2 hours. Pt ate lunch and tolerated. will continue to monitor patient for any acute change.       REVIEW OF SYSTEMS:  CONSTITUTIONAL: No fever, weight loss, or fatigue  EYES: No eye pain, visual disturbances, or discharge  ENMT:  No difficulty hearing, tinnitus, vertigo; No sinus or throat pain  NECK: No pain or stiffness  BREASTS: No pain, masses, or nipple discharge  RESPIRATORY: No cough, wheezing, chills or hemoptysis; No shortness of breath  CARDIOVASCULAR: No chest pain, palpitations, dizziness, or leg swelling  GASTROINTESTINAL: No abdominal or epigastric pain. No nausea, vomiting, or hematemesis; No diarrhea or constipation. No melena or hematochezia.  GENITOURINARY: No dysuria, frequency, hematuria, or incontinence  NEUROLOGICAL: No headaches, memory loss, loss of strength, numbness, or tremors  SKIN: No itching, burning, rashes, or lesions   LYMPH NODES: No enlarged glands  ENDOCRINE: No heat or cold intolerance; No hair loss  MUSCULOSKELETAL: No joint pain or swelling; No muscle, back, or extremity pain  PSYCHIATRIC: No depression, anxiety, mood swings, or difficulty sleeping  HEME/LYMPH: No easy bruising, or bleeding gums  ALLERY AND IMMUNOLOGIC: No hives or eczema  FAMILY HISTORY:  FHx: hypertension (Father)
Electrophysiology    Pts device __DC PPM SJM____ was interrogated on 01-11-25  Device working properly  Events: no events  Pt is ___IS__ pacemaker dependent   AP   2.1 % /   <99%  %  AT/AF burden    0 %    Mode DDD   Battery 8.3yrs      Contact EP ACP with any questions [phone]
pt refused to participate in therapy, refused to go down to rehab gym, and also refused bedside therapy. Will continue to encourage participation

## 2025-01-12 LAB
GLUCOSE BLDC GLUCOMTR-MCNC: 160 MG/DL — HIGH (ref 70–99)
GLUCOSE BLDC GLUCOMTR-MCNC: 197 MG/DL — HIGH (ref 70–99)
GLUCOSE BLDC GLUCOMTR-MCNC: 242 MG/DL — HIGH (ref 70–99)

## 2025-01-12 RX ADMIN — CALCIUM CARBONATE 1 TABLET(S): 750 TABLET, CHEWABLE ORAL at 05:47

## 2025-01-12 RX ADMIN — CHLORHEXIDINE GLUCONATE 1 APPLICATION(S): 1.2 RINSE ORAL at 05:47

## 2025-01-12 RX ADMIN — CARVEDILOL 3.12 MILLIGRAM(S): 25 TABLET, FILM COATED ORAL at 05:47

## 2025-01-12 NOTE — PROGRESS NOTE ADULT - SUBJECTIVE AND OBJECTIVE BOX
Patient is a 64y old  Female who presents with a chief complaint of Rehab of Multiple trauma/ right hip fracture (07 Jan 2025 13:07)      HPI:  Patient is a 63 yo female with PMHx of Afib on eliquis, HTN, DM, CHB wiht PPM, schizophrenia who presented to the ED on 01/02/25 after an unwitnessed fall with no head strike and no LOC. Patient was found on the ground outside of her apartment for an unknown period of time by her   who was there to  the patient for a PCP visit. Denied dizziness, tachycardia, diaphoresis prior to fall. Post- fall patient endorsed right hip pain, Of note, patient suffered a ground level fall 1 week prior 2/2 LE weakness however eloped prior to receiving medical tx. Imaging in ED showed:    CT HEAD:  No acute intracranial findings.  Stable mild-moderate chronic microvascular ischemic changes.    CT CERVICAL SPINE:  No acute cervical fracture or facet subluxation.    CT abdomen and pelvis w IV contrast:  Acute, displaced right femoral neck fracture.  Acute, nondisplaced right anterior 6-7th rib fracture; no pneumothorax.    XRAY tibia fibula:  No acute displaced fracture. Osteopenia.    Patient was evaluated by orthopedics and is s/p R hip hemiarthroplasty on 1/3 and WBAT RLE.     Prior to admission, the patient was independent in ADLs, had assistance from family and friends for iADLs, and ambulated without an assistive device. Patient now requires Vanessa for bed mobility, Vanessa for sit-stand transfer, ambulate 5' x2 Vanessa with RW, modA for bathing, CGA for UBD, maxA for LBD, CGA for toileting 2/2 to R hip hemiarthroplasty. Therefore, there is a significant functional decline from baseline. Patients also with medical comorbidities of Afib, HTN, DM requiring medication management and monitoring of vitals by Physiatry team and nursing. There are significant comorbidities which warrants acute rehab hospitalization. To return home it is in the patient’s best interest to have acute inpatient rehabilitative services to undergo intensive interdisciplinary therapy. Of note, the patient lives alone and would have difficulty performing ADLs and iADLs individually. Furthermore, the patient is motivated and is able to tolerate up to 3 hours of daily therapy for 5-6 days a week for a total of 15 hours a week. Evaluated by Physiatry and deemed to be an excellent candidate for admission to  for acute inpatient rehab. Admitted to Acute Rehab on 1/7/25.     LS: lives alone in sixth floor apt with elevator access and 1 MITCHELL  PLOF: Independent in ADLs. Assistance from family/friends for iADLs. Cane for ambulation.      (07 Jan 2025 13:07)      TODAY'S SUBJECTIVE & REVIEW OF SYMPTOMS:  Patient seen at bedside. Offers no complaints. No events overnight. Pt intermittently refuses meds/therapies - patient repeatedly educated. Vitals reviewed.       ROS:     Constitutional    [  x ] WNL           [   ] poor appetite   [   ] insomnia   [   ] tired   Cardio:                [ x  ] WNL           [   ] CP   [   ] WARD   [   ] palpitations               Resp:                   [ x  ] WNL           [   ] SOB   [   ] cough   [   ] wheezing   GI:                        [ x  ] WNL           [   ] constipation   [   ] diarrhea   [   ] abdominal pain   [   ] nausea   [   ] emesis                                :                      [x   ] WNL           [   ] PATEL  [   ] dysuria   [   ] difficulty voiding             Endo:                   [x   ] WNL          [   ] po;yuria   [   ] temperature intolerance                 Skin:                     [ x  ] WNL          [   ] pain   [   ] wound   [   ] rash   MSK:                    [   ] WNL          [   ] muscle pain   [  x ] joint pain/ stiffness   [x   ] muscle tenderness right hip   [   ] swelling   Neuro:                 [x   ] WNL          [   ] HA   [   ] change in vision   [   ] tremor   [   ] weakness   [   ]dysphagia              Cognitive:           [ x  ] WNL           [   ]confusion      Psych:                  [ x  ] WNL           [   ] hallucinations   [   ]agitation   [   ] delusion   [   ]depression      PHYSICAL EXAM    Vital Signs Last 24 Hrs  T(C): 36.6 (12 Jan 2025 05:21), Max: 36.6 (11 Jan 2025 12:32)  T(F): 97.8 (12 Jan 2025 05:21), Max: 97.9 (11 Jan 2025 12:32)  HR: 68 (12 Jan 2025 05:21) (60 - 70)  BP: 141/70 (12 Jan 2025 05:21) (141/70 - 161/71)  BP(mean): --  RR: 18 (12 Jan 2025 05:21) (18 - 18)  SpO2: 95% (11 Jan 2025 19:33) (95% - 95%)    Parameters below as of 11 Jan 2025 19:33  Patient On (Oxygen Delivery Method): room air        General:[  x ] NAD, Resting Comfortable,   [   ] other:                                HEENT: [ x  ] NC/AT, EOMI, PERRL , Normal Conjunctivae,   [   ] other:  Cardio: [  x ] RRR, no murmer,   [   ] other:                              Pulm: [ x  ] No Respiratory Distress,  Lungs CTAB,   [   ] other:                       Abdomen: [ x  ]ND/NT, Soft,   [   ] other:    : [ x  ] NO PATEL CATHETER, [   ] PATEL CATHETER- no meatal tear, no discharge, [   ] other:                                            MSK: [   ] No joint swelling, Full ROM,   [x   ] other: R hip, limited ROM 2/2 pain                                    Ext: [ x  ]No C/C/E, No calf tenderness,   [   ]other:    Skin: [   ]intact,   [   x] other: few b/l LE abrasions/scabs                                                                  Neurological Examination:  Cognitive: [ x   ] AAO x 3,   [    ]  other:                                                                      Attention:  [ x   ] intact,   [    ]  other:                               Mood/Affect: [ x   ] wnl,    [    ]  other:                                                                             Communication: [ x   ]Fluent, no dysarthria, following commands:  [    ] other:   CN II - XII:  [ x   ] intact,  [    ] other:                                                                                        Motor:   RIGHT UE: [  x ] WNL,  [   ] other:  LEFT    UE: [  x ] WNL,  [   ] other:  RIGHT LE: [   ] WNL,  [ x  ] other:  3-/5 except distally PF/DF 4+/5  LEFT    LE: [x   ] WNL,  [   ] other:    Tone: [  x  ] wnl,   [    ]  other:  DTRs: [  x ]symmetric, [   ] other:                                                                           Sensory: [   x ] Intact to light touch,   [    ] other:      MEDICATIONS  (STANDING):  MEDICATIONS  (STANDING):  apixaban 5 milliGRAM(s) Oral two times a day  atorvastatin 80 milliGRAM(s) Oral at bedtime  calcium carbonate   1250 mG (OsCal) 1 Tablet(s) Oral two times a day  carvedilol 3.125 milliGRAM(s) Oral every 12 hours  chlorhexidine 2% Cloths 1 Application(s) Topical <User Schedule>  dextrose 5%. 1000 milliLiter(s) (100 mL/Hr) IV Continuous <Continuous>  dextrose 5%. 1000 milliLiter(s) (50 mL/Hr) IV Continuous <Continuous>  dextrose 50% Injectable 25 Gram(s) IV Push once  dextrose 50% Injectable 12.5 Gram(s) IV Push once  dextrose 50% Injectable 25 Gram(s) IV Push once  ergocalciferol 94154 Unit(s) Oral every week  ezetimibe 10 milliGRAM(s) Oral daily  glucagon  Injectable 1 milliGRAM(s) IntraMuscular once  melatonin 10 milliGRAM(s) Oral at bedtime  polyethylene glycol 3350 17 Gram(s) Oral two times a day  senna 2 Tablet(s) Oral at bedtime    MEDICATIONS  (PRN):  acetaminophen     Tablet .. 650 milliGRAM(s) Oral every 6 hours PRN Mild Pain (1 - 3)  dextrose Oral Gel 15 Gram(s) Oral once PRN Blood Glucose LESS THAN 70 milliGRAM(s)/deciliter  furosemide    Tablet 40 milliGRAM(s) Oral daily PRN fluid overload  oxyCODONE    IR 5 milliGRAM(s) Oral every 6 hours PRN Moderate Pain (4 - 6)      RECENT LABS/IMAGING                        12.4   9.78  )-----------( 325      ( 08 Jan 2025 07:08 )             37.3     01-08    143  |  105  |  16  ----------------------------<  148[H]  4.4   |  25  |  0.5[L]    Ca    8.6      08 Jan 2025 07:08  Mg     1.9     01-08    TPro  6.0  /  Alb  3.3[L]  /  TBili  0.5  /  DBili  x   /  AST  21  /  ALT  19  /  AlkPhos  68  01-08

## 2025-01-13 LAB
GLUCOSE BLDC GLUCOMTR-MCNC: 117 MG/DL — HIGH (ref 70–99)
GLUCOSE BLDC GLUCOMTR-MCNC: 173 MG/DL — HIGH (ref 70–99)
GLUCOSE BLDC GLUCOMTR-MCNC: 175 MG/DL — HIGH (ref 70–99)
GLUCOSE BLDC GLUCOMTR-MCNC: 223 MG/DL — HIGH (ref 70–99)

## 2025-01-13 RX ADMIN — APIXABAN 5 MILLIGRAM(S): 5 TABLET, FILM COATED ORAL at 05:35

## 2025-01-13 RX ADMIN — CHLORHEXIDINE GLUCONATE 1 APPLICATION(S): 1.2 RINSE ORAL at 05:38

## 2025-01-13 RX ADMIN — CALCIUM CARBONATE 1 TABLET(S): 750 TABLET, CHEWABLE ORAL at 05:35

## 2025-01-13 RX ADMIN — CARVEDILOL 3.12 MILLIGRAM(S): 25 TABLET, FILM COATED ORAL at 05:35

## 2025-01-13 RX ADMIN — Medication 17 GRAM(S): at 05:35

## 2025-01-13 NOTE — PROGRESS NOTE ADULT - SUBJECTIVE AND OBJECTIVE BOX
Patient is a 64y old  Female who presents with a chief complaint of Rehab of Multiple trauma/ right hip fracture (07 Jan 2025 13:07)      HPI:  Patient is a 65 yo female with PMHx of Afib on eliquis, HTN, DM, CHB wiht PPM, schizophrenia who presented to the ED on 01/02/25 after an unwitnessed fall with no head strike and no LOC. Patient was found on the ground outside of her apartment for an unknown period of time by her   who was there to  the patient for a PCP visit. Denied dizziness, tachycardia, diaphoresis prior to fall. Post- fall patient endorsed right hip pain, Of note, patient suffered a ground level fall 1 week prior 2/2 LE weakness however eloped prior to receiving medical tx. Imaging in ED showed:    CT HEAD:  No acute intracranial findings.  Stable mild-moderate chronic microvascular ischemic changes.    CT CERVICAL SPINE:  No acute cervical fracture or facet subluxation.    CT abdomen and pelvis w IV contrast:  Acute, displaced right femoral neck fracture.  Acute, nondisplaced right anterior 6-7th rib fracture; no pneumothorax.    XRAY tibia fibula:  No acute displaced fracture. Osteopenia.    Patient was evaluated by orthopedics and is s/p R hip hemiarthroplasty on 1/3 and WBAT RLE.     Prior to admission, the patient was independent in ADLs, had assistance from family and friends for iADLs, and ambulated without an assistive device. Patient now requires Vanessa for bed mobility, Vanessa for sit-stand transfer, ambulate 5' x2 Vanessa with RW, modA for bathing, CGA for UBD, maxA for LBD, CGA for toileting 2/2 to R hip hemiarthroplasty. Therefore, there is a significant functional decline from baseline. Patients also with medical comorbidities of Afib, HTN, DM requiring medication management and monitoring of vitals by Physiatry team and nursing. There are significant comorbidities which warrants acute rehab hospitalization. To return home it is in the patient’s best interest to have acute inpatient rehabilitative services to undergo intensive interdisciplinary therapy. Of note, the patient lives alone and would have difficulty performing ADLs and iADLs individually. Furthermore, the patient is motivated and is able to tolerate up to 3 hours of daily therapy for 5-6 days a week for a total of 15 hours a week. Evaluated by Physiatry and deemed to be an excellent candidate for admission to  for acute inpatient rehab. Admitted to Acute Rehab on 1/7/25.     LS: lives alone in sixth floor apt with elevator access and 1 MITCHELL  PLOF: Independent in ADLs. Assistance from family/friends for iADLs. Cane for ambulation.      (07 Jan 2025 13:07)      TODAY'S SUBJECTIVE & REVIEW OF SYMPTOMS:  Patient seen at bedside. Offers no complaints. No events overnight. Pain is well controlled. Pt intermittently refuses meds/therapies however is becoming more compliant- patient repeatedly educated. Vitals reviewed. Constipated.        ROS:     Constitutional    [  x ] WNL           [   ] poor appetite   [   ] insomnia   [   ] tired   Cardio:                [ x  ] WNL           [   ] CP   [   ] WARD   [   ] palpitations               Resp:                   [ x  ] WNL           [   ] SOB   [   ] cough   [   ] wheezing   GI:                        [   ] WNL           [  x ] constipation   [   ] diarrhea   [   ] abdominal pain   [   ] nausea   [   ] emesis                                :                      [x   ] WNL           [   ] PATEL  [   ] dysuria   [   ] difficulty voiding             Endo:                   [x   ] WNL          [   ] po;yuria   [   ] temperature intolerance                 Skin:                     [ x  ] WNL          [   ] pain   [   ] wound   [   ] rash   MSK:                    [   ] WNL          [   ] muscle pain   [  x ] joint pain/ stiffness   [x   ] muscle tenderness right hip   [   ] swelling   Neuro:                 [x   ] WNL          [   ] HA   [   ] change in vision   [   ] tremor   [   ] weakness   [   ]dysphagia              Cognitive:           [ x  ] WNL           [   ]confusion      Psych:                  [ x  ] WNL           [   ] hallucinations   [   ]agitation   [   ] delusion   [   ]depression      PHYSICAL EXAM    Vital Signs Last 24 Hrs  Vital Signs Last 24 Hrs  T(C): 37.1 (13 Jan 2025 06:01), Max: 37.1 (13 Jan 2025 06:01)  T(F): 98.7 (13 Jan 2025 06:01), Max: 98.7 (13 Jan 2025 06:01)  HR: 71 (13 Jan 2025 06:01) (69 - 71)  BP: 125/73 (13 Jan 2025 06:01) (125/73 - 134/70)  BP(mean): --  RR: 18 (13 Jan 2025 06:01) (18 - 18)  SpO2: 93% (12 Jan 2025 21:53) (93% - 93%)    Parameters below as of 12 Jan 2025 21:53  Patient On (Oxygen Delivery Method): room air      General:[  x ] NAD, Resting Comfortable,   [   ] other:                                HEENT: [ x  ] NC/AT, EOMI, PERRL , Normal Conjunctivae,   [   ] other:  Cardio: [  x ] RRR, no murmer,   [   ] other:                              Pulm: [ x  ] No Respiratory Distress,  Lungs CTAB,   [   ] other:                       Abdomen: [ x  ]ND/NT, Soft,   [   ] other:    : [ x  ] NO PATEL CATHETER, [   ] PATEL CATHETER- no meatal tear, no discharge, [   ] other:                                            MSK: [   ] No joint swelling, Full ROM,   [x   ] other: R hip, limited ROM 2/2 pain                                    Ext: [ x  ]No C/C/E, No calf tenderness,   [   ]other:    Skin: [   ]intact,   [   x] other: R hip surgical site open to air, clean, no erythema , or discharge  few b/l LE abrasions/scabs                                                                  Neurological Examination:  Cognitive: [ x   ] AAO x 3,   [    ]  other:                                                                      Attention:  [ x   ] intact,   [    ]  other:                               Mood/Affect: [ x   ] wnl,    [    ]  other:                                                                             Communication: [ x   ]Fluent, no dysarthria, following commands:  [    ] other:   CN II - XII:  [ x   ] intact,  [    ] other:                                                                                        Motor:   RIGHT UE: [  x ] WNL,  [   ] other:  LEFT    UE: [  x ] WNL,  [   ] other:  RIGHT LE: [   ] WNL,  [ x  ] other:  3-/5 except distally PF/DF 4+/5  LEFT    LE: [x   ] WNL,  [   ] other:    Tone: [  x  ] wnl,   [    ]  other:  DTRs: [  x ]symmetric, [   ] other:                                                                           Sensory: [   x ] Intact to light touch,   [    ] other:      MEDICATIONS  (STANDING):  apixaban 5 milliGRAM(s) Oral two times a day  atorvastatin 80 milliGRAM(s) Oral at bedtime  calcium carbonate   1250 mG (OsCal) 1 Tablet(s) Oral two times a day  carvedilol 3.125 milliGRAM(s) Oral every 12 hours  chlorhexidine 2% Cloths 1 Application(s) Topical <User Schedule>  dextrose 5%. 1000 milliLiter(s) (100 mL/Hr) IV Continuous <Continuous>  dextrose 5%. 1000 milliLiter(s) (50 mL/Hr) IV Continuous <Continuous>  dextrose 50% Injectable 25 Gram(s) IV Push once  dextrose 50% Injectable 12.5 Gram(s) IV Push once  dextrose 50% Injectable 25 Gram(s) IV Push once  ergocalciferol 22098 Unit(s) Oral every week  ezetimibe 10 milliGRAM(s) Oral daily  glucagon  Injectable 1 milliGRAM(s) IntraMuscular once  melatonin 10 milliGRAM(s) Oral at bedtime  polyethylene glycol 3350 17 Gram(s) Oral two times a day  senna 2 Tablet(s) Oral at bedtime    MEDICATIONS  (PRN):  acetaminophen     Tablet .. 650 milliGRAM(s) Oral every 6 hours PRN Mild Pain (1 - 3)  dextrose Oral Gel 15 Gram(s) Oral once PRN Blood Glucose LESS THAN 70 milliGRAM(s)/deciliter  furosemide    Tablet 40 milliGRAM(s) Oral daily PRN fluid overload  oxyCODONE    IR 5 milliGRAM(s) Oral every 6 hours PRN Moderate Pain (4 - 6)        RECENT LABS/IMAGING                        12.4   9.78  )-----------( 325      ( 08 Jan 2025 07:08 )             37.3     01-08    143  |  105  |  16  ----------------------------<  148[H]  4.4   |  25  |  0.5[L]    Ca    8.6      08 Jan 2025 07:08  Mg     1.9     01-08    TPro  6.0  /  Alb  3.3[L]  /  TBili  0.5  /  DBili  x   /  AST  21  /  ALT  19  /  AlkPhos  68  01-08     Patient is a 64y old  Female who presents with a chief complaint of Rehab of Multiple trauma/ right hip fracture (07 Jan 2025 13:07)      HPI:  Patient is a 63 yo female with PMHx of Afib on eliquis, HTN, DM, CHB wiht PPM, schizophrenia who presented to the ED on 01/02/25 after an unwitnessed fall with no head strike and no LOC. Patient was found on the ground outside of her apartment for an unknown period of time by her   who was there to  the patient for a PCP visit. Denied dizziness, tachycardia, diaphoresis prior to fall. Post- fall patient endorsed right hip pain, Of note, patient suffered a ground level fall 1 week prior 2/2 LE weakness however eloped prior to receiving medical tx. Imaging in ED showed:    CT HEAD:  No acute intracranial findings.  Stable mild-moderate chronic microvascular ischemic changes.    CT CERVICAL SPINE:  No acute cervical fracture or facet subluxation.    CT abdomen and pelvis w IV contrast:  Acute, displaced right femoral neck fracture.  Acute, nondisplaced right anterior 6-7th rib fracture; no pneumothorax.    XRAY tibia fibula:  No acute displaced fracture. Osteopenia.    Patient was evaluated by orthopedics and is s/p R hip hemiarthroplasty on 1/3 and WBAT RLE.     Prior to admission, the patient was independent in ADLs, had assistance from family and friends for iADLs, and ambulated without an assistive device. Patient now requires Vanessa for bed mobility, Vanessa for sit-stand transfer, ambulate 5' x2 Vanessa with RW, modA for bathing, CGA for UBD, maxA for LBD, CGA for toileting 2/2 to R hip hemiarthroplasty. Therefore, there is a significant functional decline from baseline. Patients also with medical comorbidities of Afib, HTN, DM requiring medication management and monitoring of vitals by Physiatry team and nursing. There are significant comorbidities which warrants acute rehab hospitalization. To return home it is in the patient’s best interest to have acute inpatient rehabilitative services to undergo intensive interdisciplinary therapy. Of note, the patient lives alone and would have difficulty performing ADLs and iADLs individually. Furthermore, the patient is motivated and is able to tolerate up to 3 hours of daily therapy for 5-6 days a week for a total of 15 hours a week. Evaluated by Physiatry and deemed to be an excellent candidate for admission to  for acute inpatient rehab. Admitted to Acute Rehab on 1/7/25.     LS: lives alone in sixth floor apt with elevator access and 1 MITCHELL  PLOF: Independent in ADLs. Assistance from family/friends for iADLs. Cane for ambulation.      (07 Jan 2025 13:07)      TODAY'S SUBJECTIVE & REVIEW OF SYMPTOMS:  Patient seen at bedside. Offers no complaints. No events overnight. Pain is well controlled. Pt intermittently refuses meds/therapies however is becoming more compliant- patient repeatedly educated. Vitals reviewed. Constipated.        ROS:     Constitutional    [  x ] WNL           [   ] poor appetite   [   ] insomnia   [   ] tired   Cardio:                [ x  ] WNL           [   ] CP   [   ] WARD   [   ] palpitations               Resp:                   [ x  ] WNL           [   ] SOB   [   ] cough   [   ] wheezing   GI:                        [   ] WNL           [  x ] constipation   [   ] diarrhea   [   ] abdominal pain   [   ] nausea   [   ] emesis                                :                      [x   ] WNL           [   ] PATEL  [   ] dysuria   [   ] difficulty voiding             Endo:                   [x   ] WNL          [   ] po;yuria   [   ] temperature intolerance                 Skin:                     [ x  ] WNL          [   ] pain   [   ] wound   [   ] rash   MSK:                    [   ] WNL          [   ] muscle pain   [  x ] joint pain/ stiffness   [x   ] muscle tenderness right hip   [   ] swelling   Neuro:                 [x   ] WNL          [   ] HA   [   ] change in vision   [   ] tremor   [   ] weakness   [   ]dysphagia              Cognitive:           [ x  ] WNL           [   ]confusion      Psych:                  [ x  ] WNL           [   ] hallucinations   [   ]agitation   [   ] delusion   [   ]depression      PHYSICAL EXAM    Vital Signs Last 24 Hrs  Vital Signs Last 24 Hrs  T(C): 37.1 (13 Jan 2025 06:01), Max: 37.1 (13 Jan 2025 06:01)  T(F): 98.7 (13 Jan 2025 06:01), Max: 98.7 (13 Jan 2025 06:01)  HR: 71 (13 Jan 2025 06:01) (69 - 71)  BP: 125/73 (13 Jan 2025 06:01) (125/73 - 134/70)  BP(mean): --  RR: 18 (13 Jan 2025 06:01) (18 - 18)  SpO2: 93% (12 Jan 2025 21:53) (93% - 93%)    Parameters below as of 12 Jan 2025 21:53  Patient On (Oxygen Delivery Method): room air      General:[  x ] NAD, Resting Comfortable,   [   ] other:                                HEENT: [ x  ] NC/AT, EOMI, PERRL , Normal Conjunctivae,   [   ] other:  Cardio: [  x ] RRR, no murmer,   [   ] other:                              Pulm: [ x  ] No Respiratory Distress,  Lungs CTAB,   [   ] other:                       Abdomen: [ x  ]ND/NT, Soft,   [   ] other:    : [ x  ] NO PATEL CATHETER, [   ] PATEL CATHETER- no meatal tear, no discharge, [   ] other:                                            MSK: [   ] No joint swelling, Full ROM,   [x   ] other: R hip, limited ROM 2/2 pain                                    Ext: [ x  ]No C/C/E, No calf tenderness,   [   ]other:    Skin: [   ]intact,   [   x] other: R hip surgical site open to air, clean, no erythema , or discharge  few b/l LE abrasions/scabs                                                                  Neurological Examination:  Cognitive: [ x   ] AAO x 3,   [    ]  other:                                                                      Attention:  [ x   ] intact,   [    ]  other:                               Mood/Affect: [ x   ] wnl,    [    ]  other:                                                                             Communication: [ x   ]Fluent, no dysarthria, following commands:  [    ] other:   CN II - XII:  [ x   ] intact,  [    ] other:                                                                                        Motor:   RIGHT UE: [  x ] WNL,  [   ] other:  LEFT    UE: [  x ] WNL,  [   ] other:  RIGHT LE: [   ] WNL,  [ x  ] other:  3-/5 except distally PF/DF 4+/5  LEFT    LE: [x   ] WNL,  [   ] other:    Tone: [  x  ] wnl,   [    ]  other:  DTRs: [  x ]symmetric, [   ] other:                                                                           Sensory: [   x ] Intact to light touch,   [    ] other:      MEDICATIONS  (STANDING):  apixaban 5 milliGRAM(s) Oral two times a day  atorvastatin 80 milliGRAM(s) Oral at bedtime  calcium carbonate   1250 mG (OsCal) 1 Tablet(s) Oral two times a day  carvedilol 3.125 milliGRAM(s) Oral every 12 hours  chlorhexidine 2% Cloths 1 Application(s) Topical <User Schedule>  dextrose 5%. 1000 milliLiter(s) (100 mL/Hr) IV Continuous <Continuous>  dextrose 5%. 1000 milliLiter(s) (50 mL/Hr) IV Continuous <Continuous>  dextrose 50% Injectable 25 Gram(s) IV Push once  dextrose 50% Injectable 12.5 Gram(s) IV Push once  dextrose 50% Injectable 25 Gram(s) IV Push once  ergocalciferol 36812 Unit(s) Oral every week  ezetimibe 10 milliGRAM(s) Oral daily  glucagon  Injectable 1 milliGRAM(s) IntraMuscular once  melatonin 10 milliGRAM(s) Oral at bedtime  polyethylene glycol 3350 17 Gram(s) Oral two times a day  senna 2 Tablet(s) Oral at bedtime    MEDICATIONS  (PRN):  acetaminophen     Tablet .. 650 milliGRAM(s) Oral every 6 hours PRN Mild Pain (1 - 3)  dextrose Oral Gel 15 Gram(s) Oral once PRN Blood Glucose LESS THAN 70 milliGRAM(s)/deciliter  furosemide    Tablet 40 milliGRAM(s) Oral daily PRN fluid overload  oxyCODONE    IR 5 milliGRAM(s) Oral every 6 hours PRN Moderate Pain (4 - 6)        RECENT LABS/IMAGING      POCT Blood Glucose.: 223 mg/dL (01-13-25 @ 11:48)  POCT Blood Glucose.: 173 mg/dL (01-13-25 @ 07:40)  POCT Blood Glucose.: 197 mg/dL (01-12-25 @ 21:10)  POCT Blood Glucose.: 160 mg/dL (01-12-25 @ 16:40)  POCT Blood Glucose.: 242 mg/dL (01-12-25 @ 12:04)  POCT Blood Glucose.: 144 mg/dL (01-11-25 @ 21:05)  POCT Blood Glucose.: 171 mg/dL (01-11-25 @ 16:40)  POCT Blood Glucose.: 158 mg/dL (01-11-25 @ 11:14)  POCT Blood Glucose.: 154 mg/dL (01-11-25 @ 07:31)  POCT Blood Glucose.: 145 mg/dL (01-10-25 @ 20:55)  POCT Blood Glucose.: 193 mg/dL (01-10-25 @ 16:11)  POCT Blood Glucose.: 230 mg/dL (01-10-25 @ 12:44)                          12.4   9.78  )-----------( 325      ( 08 Jan 2025 07:08 )             37.3     01-08    143  |  105  |  16  ----------------------------<  148[H]  4.4   |  25  |  0.5[L]    Ca    8.6      08 Jan 2025 07:08  Mg     1.9     01-08    TPro  6.0  /  Alb  3.3[L]  /  TBili  0.5  /  DBili  x   /  AST  21  /  ALT  19  /  AlkPhos  68  01-08

## 2025-01-13 NOTE — PROGRESS NOTE ADULT - ASSESSMENT
63 yo female with PMHx of Afib on eliquis, HTN, DM, CHB wiht PPM, schizophrenia who presented to the ED on 01/02/25 after an unwitnessed fall with right femoral neck fracture on imaging s/p R hip hemiarthroplasty on 1/3. Admitted to rehab for mulittrauama s/p R hip hemiarthroplasty     #Rehab of right femoral neck fracture 2/2 mechanical fall s/p R hip hemiarthroplasty with  gait abnormality and decline in function  #Right anterior 6-7th rib fx   #Multiple BLE abrasions  - CT Chest: Acute, displaced right femoral neck fracture; Acute, nondisplaced right anterior 6-7th rib fracture; no pneumothorax.  - WBAT RLE  -Pain control with tylenol prn and oxycodone 5mg prn  - continue full rehab program    #Vitamin D deficiency   - continue with calcium and vitamin D supplementation     #Paroxysmal Afib on eliquis  #CHB with PPM  -patient is pacemaker dependent   -evaluated by electrophysiology on admission: follow up outpatient  - continue with home med Eliquis   - rate controlled  - had brief unresponsive episode in chair on 1/9. EPS interrogated the PPM, noted no events. .   possible seizure or orthostatic episode . Monitor.    #HTN  -continue with coreg 3.125mg bid   -monitor BP and adjust medication as needed    prediabetes  -A1C 6.1  -patient understand the benefits of glycemic control and the risks of hyperglycemia and uncontrolled diabates however at this point in time she delcines treatment for DM and hyperglycemia, does not want insulin.     #HLD  -continue with lipitor 80mg qhs  -continue with ezetimibe 10mg daily     #Schizophrenia  - no evidence of active psychosis  - cooperative with therapy, though not always compliant with taking meds.  - monitor, Psych consult prn    #tobacco dependence   -1PPD for past 40 years  - declining nicotine patch at this time   - offer counseling     -Pain control: prn tylenol and prn oxycodone     -GI/Bowel Mgmt: miralax bid      -Skin:  monitor imcision      - Diet: DASH/ CC    Precautions / PROPHYLAXIS:      - Falls    - Ortho: Weight bearing status: WBAT RLE      - DVT prophylaxis: Eliquis         65 yo female with PMHx of Afib on eliquis, HTN, DM, CHB wiht PPM, schizophrenia who presented to the ED on 01/02/25 after an unwitnessed fall with right femoral neck fracture on imaging s/p R hip hemiarthroplasty on 1/3. Admitted to rehab for mulittrauama s/p R hip hemiarthroplasty     #Rehab of right femoral neck fracture 2/2 mechanical fall s/p R hip hemiarthroplasty with  gait abnormality and decline in function  #Right anterior 6-7th rib fx   #Multiple BLE abrasions  - CT Chest: Acute, displaced right femoral neck fracture; Acute, nondisplaced right anterior 6-7th rib fracture; no pneumothorax.  - WBAT RLE  -Pain control with tylenol prn and oxycodone 5mg prn  - continue full rehab program    #Vitamin D deficiency   - continue with calcium and vitamin D supplementation     #Paroxysmal Afib on eliquis  #CHB with PPM  -patient is pacemaker dependent   -evaluated by electrophysiology on admission: follow up outpatient  - continue with home med Eliquis   - rate controlled  - had brief unresponsive episode in chair on 1/9. EPS interrogated the PPM, noted no events. .   possible seizure or orthostatic episode . Monitor.    #HTN  -continue with coreg 3.125mg bid   -monitor BP and adjust medication as needed    prediabetes  -A1C 6.1  -patient understand the benefits of glycemic control and the risks of hyperglycemia and uncontrolled diabates however at this point in time she delcines treatment for DM and hyperglycemia, does not want insulin.     #HLD  -continue with lipitor 80mg qhs  -continue with ezetimibe 10mg daily     #Schizophrenia  - no evidence of active psychosis  - cooperative with therapy, though not always compliant with taking meds.  - monitor, Psych consult prn    #tobacco dependence   -1PPD for past 40 years  - declining nicotine patch at this time   - offer counseling     -Pain control: prn tylenol and prn oxycodone     -GI/Bowel Mgmt: miralax bid      -Skin:  monitor incision      - Diet: DASH/ CC    Precautions / PROPHYLAXIS:      - Falls    - Ortho: Weight bearing status: WBAT RLE      - DVT prophylaxis: Eliquis

## 2025-01-14 LAB
GLUCOSE BLDC GLUCOMTR-MCNC: 134 MG/DL — HIGH (ref 70–99)
GLUCOSE BLDC GLUCOMTR-MCNC: 153 MG/DL — HIGH (ref 70–99)
GLUCOSE BLDC GLUCOMTR-MCNC: 198 MG/DL — HIGH (ref 70–99)
GLUCOSE BLDC GLUCOMTR-MCNC: 266 MG/DL — HIGH (ref 70–99)

## 2025-01-14 RX ORDER — FLUPHENAZINE HYDROCHLORIDE 1 MG/1
37.5 TABLET, FILM COATED ORAL ONCE
Refills: 0 | Status: COMPLETED | OUTPATIENT
Start: 2025-01-14 | End: 2025-01-14

## 2025-01-14 RX ADMIN — CALCIUM CARBONATE 1 TABLET(S): 750 TABLET, CHEWABLE ORAL at 18:44

## 2025-01-14 RX ADMIN — APIXABAN 5 MILLIGRAM(S): 5 TABLET, FILM COATED ORAL at 18:44

## 2025-01-14 RX ADMIN — CARVEDILOL 3.12 MILLIGRAM(S): 25 TABLET, FILM COATED ORAL at 18:44

## 2025-01-14 RX ADMIN — Medication 10 MILLIGRAM(S): at 22:20

## 2025-01-14 RX ADMIN — ATORVASTATIN CALCIUM 80 MILLIGRAM(S): 40 TABLET, FILM COATED ORAL at 22:20

## 2025-01-14 RX ADMIN — EZETIMIBE 10 MILLIGRAM(S): 10 TABLET ORAL at 18:46

## 2025-01-14 RX ADMIN — SENNOSIDES 2 TABLET(S): 8.6 TABLET, FILM COATED ORAL at 22:20

## 2025-01-14 NOTE — PROGRESS NOTE ADULT - ASSESSMENT
63 yo female with PMHx of Afib on eliquis, HTN, DM, CHB wiht PPM, schizophrenia who presented to the ED on 01/02/25 after an unwitnessed fall with right femoral neck fracture on imaging s/p R hip hemiarthroplasty on 1/3. Admitted to rehab for mulittrauama s/p R hip hemiarthroplasty     #Rehab of right femoral neck fracture 2/2 mechanical fall s/p R hip hemiarthroplasty with  gait abnormality and decline in function  #Right anterior 6-7th rib fx   #Multiple BLE abrasions  - CT Chest: Acute, displaced right femoral neck fracture; Acute, nondisplaced right anterior 6-7th rib fracture; no pneumothorax.  - WBAT RLE  -Pain control with tylenol prn and oxycodone 5mg prn  - continue full rehab program    #Vitamin D deficiency   - continue with calcium and vitamin D supplementation     #Paroxysmal Afib on eliquis  #CHB with PPM  -patient has been refusing eliquis despite understanding risks  -patient is pacemaker dependent   -evaluated by electrophysiology on admission: follow up outpatient  - continue with home med Eliquis   - rate controlled  - had brief unresponsive episode in chair on 1/9. EPS interrogated the PPM, noted no events. .   possible seizure or orthostatic episode . Monitor.    #HTN  -continue with coreg 3.125mg bid   -monitor BP and adjust medication as needed    prediabetes  -A1C 6.1  -patient understand the benefits of glycemic control and the risks of hyperglycemia and uncontrolled diabates however at this point in time she delcines treatment for DM and hyperglycemia, does not want insulin.     #HLD  -continue with lipitor 80mg qhs  -continue with ezetimibe 10mg daily     #Schizophrenia  - no evidence of active psychosis  - cooperative with therapy, though not always compliant with taking meds.  - monitor, Psych consult prn    #tobacco dependence   -1PPD for past 40 years  - declining nicotine patch at this time   - offer counseling     -Pain control: prn tylenol and prn oxycodone     -GI/Bowel Mgmt: miralax bid      -Skin:  monitor incision      - Diet: DASH/ CC    Precautions / PROPHYLAXIS:      - Falls    - Ortho: Weight bearing status: WBAT RLE      - DVT prophylaxis: Eliquis         65 yo female with PMHx of Afib on eliquis, HTN, DM, CHB wiht PPM, schizophrenia who presented to the ED on 01/02/25 after an unwitnessed fall with right femoral neck fracture on imaging s/p R hip hemiarthroplasty on 1/3. Admitted to rehab for mulittrauama s/p R hip hemiarthroplasty     #Rehab of right femoral neck fracture 2/2 mechanical fall s/p R hip hemiarthroplasty with  gait abnormality and decline in function  #Right anterior 6-7th rib fx   #Multiple BLE abrasions  - CT Chest: Acute, displaced right femoral neck fracture; Acute, nondisplaced right anterior 6-7th rib fracture; no pneumothorax.  - WBAT RLE  -Pain control with tylenol prn and oxycodone 5mg prn  - continue full rehab program    #Vitamin D deficiency   - continue with calcium and vitamin D supplementation     #Paroxysmal Afib on eliquis  #CHB with PPM  -patient has been refusing eliquis despite understanding risks  -patient is pacemaker dependent   -evaluated by electrophysiology on admission: follow up outpatient  - continue with home med Eliquis   - rate controlled  - had brief unresponsive episode in chair on 1/9. EPS interrogated the PPM, noted no events. .   possible seizure or orthostatic episode . Monitor.    #HTN  -continue with coreg 3.125mg bid   -monitor BP and adjust medication as needed    prediabetes  -A1C 6.1  -patient understand the benefits of glycemic control and the risks of hyperglycemia and uncontrolled diabates however at this point in time she delcines treatment for DM and hyperglycemia, does not want insulin.     #HLD  -continue with lipitor 80mg qhs  -continue with ezetimibe 10mg daily     #schizoaffective disorder   - no evidence of active psychosis  - cooperative with therapy, though not always compliant with taking meds.  - on PTA prolixin 37.5mg every 2 weeks, last dose was 12/20/24   -will give injection today, next dose due 1/28/25   - monitor, Psych consult prn    #tobacco dependence   -1PPD for past 40 years  - declining nicotine patch at this time   - offer counseling     -Pain control: prn tylenol and prn oxycodone     -GI/Bowel Mgmt: miralax bid      -Skin:  monitor incision      - Diet: DASH/ CC    Precautions / PROPHYLAXIS:      - Falls    - Ortho: Weight bearing status: WBAT RLE      - DVT prophylaxis: Eliquis

## 2025-01-14 NOTE — PROGRESS NOTE ADULT - SUBJECTIVE AND OBJECTIVE BOX
Patient is a 64y old  Female who presents with a chief complaint of Rehab of Multiple trauma/ right hip fracture (07 Jan 2025 13:07)      HPI:  Patient is a 63 yo female with PMHx of Afib on eliquis, HTN, DM, CHB wiht PPM, schizophrenia who presented to the ED on 01/02/25 after an unwitnessed fall with no head strike and no LOC. Patient was found on the ground outside of her apartment for an unknown period of time by her   who was there to  the patient for a PCP visit. Denied dizziness, tachycardia, diaphoresis prior to fall. Post- fall patient endorsed right hip pain, Of note, patient suffered a ground level fall 1 week prior 2/2 LE weakness however eloped prior to receiving medical tx. Imaging in ED showed:    CT HEAD:  No acute intracranial findings.  Stable mild-moderate chronic microvascular ischemic changes.    CT CERVICAL SPINE:  No acute cervical fracture or facet subluxation.    CT abdomen and pelvis w IV contrast:  Acute, displaced right femoral neck fracture.  Acute, nondisplaced right anterior 6-7th rib fracture; no pneumothorax.    XRAY tibia fibula:  No acute displaced fracture. Osteopenia.    Patient was evaluated by orthopedics and is s/p R hip hemiarthroplasty on 1/3 and WBAT RLE.     Prior to admission, the patient was independent in ADLs, had assistance from family and friends for iADLs, and ambulated without an assistive device. Patient now requires Vanessa for bed mobility, Vanessa for sit-stand transfer, ambulate 5' x2 Vanessa with RW, modA for bathing, CGA for UBD, maxA for LBD, CGA for toileting 2/2 to R hip hemiarthroplasty. Therefore, there is a significant functional decline from baseline. Patients also with medical comorbidities of Afib, HTN, DM requiring medication management and monitoring of vitals by Physiatry team and nursing. There are significant comorbidities which warrants acute rehab hospitalization. To return home it is in the patient’s best interest to have acute inpatient rehabilitative services to undergo intensive interdisciplinary therapy. Of note, the patient lives alone and would have difficulty performing ADLs and iADLs individually. Furthermore, the patient is motivated and is able to tolerate up to 3 hours of daily therapy for 5-6 days a week for a total of 15 hours a week. Evaluated by Physiatry and deemed to be an excellent candidate for admission to  for acute inpatient rehab. Admitted to Acute Rehab on 1/7/25.     LS: lives alone in sixth floor apt with elevator access and 1 MITCHELL  PLOF: Independent in ADLs. Assistance from family/friends for iADLs. Cane for ambulation.      (07 Jan 2025 13:07)      TODAY'S SUBJECTIVE & REVIEW OF SYMPTOMS:  Patient seen at bedside. Offers no complaints. No events overnight. Pain is well controlled. Pt intermittently refuses meds such as Eliquis and laxative/therapies however is becoming more compliant- patient repeatedly educated. Vitals reviewed. Constipated.   Patient's ACT  at bedside reports patient has schizoeaffective disorder requiring biweekly injections, which she has not received for a few doses now. She is to send the list of medications to us.        ROS:     Constitutional    [  x ] WNL           [   ] poor appetite   [   ] insomnia   [   ] tired   Cardio:                [ x  ] WNL           [   ] CP   [   ] WARD   [   ] palpitations               Resp:                   [ x  ] WNL           [   ] SOB   [   ] cough   [   ] wheezing   GI:                        [   ] WNL           [  x ] constipation   [   ] diarrhea   [   ] abdominal pain   [   ] nausea   [   ] emesis                                :                      [x   ] WNL           [   ] PATEL  [   ] dysuria   [   ] difficulty voiding             Endo:                   [x   ] WNL          [   ] po;yuria   [   ] temperature intolerance                 Skin:                     [ x  ] WNL          [   ] pain   [   ] wound   [   ] rash   MSK:                    [   ] WNL          [   ] muscle pain   [  x ] joint pain/ stiffness   [x   ] muscle tenderness right hip   [   ] swelling   Neuro:                 [x   ] WNL          [   ] HA   [   ] change in vision   [   ] tremor   [   ] weakness   [   ]dysphagia              Cognitive:           [ x  ] WNL           [   ]confusion      Psych:                  [ x  ] WNL           [   ] hallucinations   [   ]agitation   [   ] delusion   [   ]depression      PHYSICAL EXAM    Vital Signs Last 24 Hrs  T(C): 36.7 (14 Jan 2025 12:21), Max: 36.8 (13 Jan 2025 13:03)  T(F): 98.1 (14 Jan 2025 12:21), Max: 98.3 (14 Jan 2025 05:28)  HR: 98 (14 Jan 2025 12:21) (76 - 98)  BP: 126/84 (14 Jan 2025 12:21) (126/84 - 149/73)  BP(mean): 93 (13 Jan 2025 19:39) (93 - 93)  RR: 18 (14 Jan 2025 12:21) (18 - 18)  SpO2: 95% (14 Jan 2025 05:28) (95% - 98%)    Parameters below as of 14 Jan 2025 05:28  Patient On (Oxygen Delivery Method): room air    General:[  x ] NAD, Resting Comfortable,   [   ] other:                                HEENT: [ x  ] NC/AT, EOMI, PERRL , Normal Conjunctivae,   [   ] other:  Cardio: [  x ] RRR, no murmer,   [   ] other:                              Pulm: [ x  ] No Respiratory Distress,  Lungs CTAB,   [   ] other:                       Abdomen: [ x  ]ND/NT, Soft,   [   ] other:    : [ x  ] NO PATEL CATHETER, [   ] PATEL CATHETER- no meatal tear, no discharge, [   ] other:                                            MSK: [   ] No joint swelling, Full ROM,   [x   ] other: R hip, limited ROM 2/2 pain                                    Ext: [ x  ]No C/C/E, No calf tenderness,   [   ]other:    Skin: [   ]intact,   [   x] other: R hip surgical site open to air, clean, no erythema , or discharge  few b/l LE abrasions/scabs                                                                  Neurological Examination:  Cognitive: [ x   ] AAO x 3,   [    ]  other:                                                                      Attention:  [ x   ] intact,   [    ]  other:                               Mood/Affect: [ x   ] wnl,    [    ]  other:                                                                             Communication: [ x   ]Fluent, no dysarthria, following commands:  [    ] other:   CN II - XII:  [ x   ] intact,  [    ] other:                                                                                        Motor:   RIGHT UE: [  x ] WNL,  [   ] other:  LEFT    UE: [  x ] WNL,  [   ] other:  RIGHT LE: [   ] WNL,  [ x  ] other:  3-/5 except distally PF/DF 4+/5  LEFT    LE: [x   ] WNL,  [   ] other:    Tone: [  x  ] wnl,   [    ]  other:  DTRs: [  x ]symmetric, [   ] other:                                                                           Sensory: [   x ] Intact to light touch,   [    ] other:      MEDICATIONS  (STANDING):  apixaban 5 milliGRAM(s) Oral two times a day  atorvastatin 80 milliGRAM(s) Oral at bedtime  calcium carbonate   1250 mG (OsCal) 1 Tablet(s) Oral two times a day  carvedilol 3.125 milliGRAM(s) Oral every 12 hours  chlorhexidine 2% Cloths 1 Application(s) Topical <User Schedule>  dextrose 5%. 1000 milliLiter(s) (100 mL/Hr) IV Continuous <Continuous>  dextrose 5%. 1000 milliLiter(s) (50 mL/Hr) IV Continuous <Continuous>  dextrose 50% Injectable 25 Gram(s) IV Push once  dextrose 50% Injectable 12.5 Gram(s) IV Push once  dextrose 50% Injectable 25 Gram(s) IV Push once  ergocalciferol 70532 Unit(s) Oral every week  ezetimibe 10 milliGRAM(s) Oral daily  glucagon  Injectable 1 milliGRAM(s) IntraMuscular once  melatonin 10 milliGRAM(s) Oral at bedtime  polyethylene glycol 3350 17 Gram(s) Oral two times a day  senna 2 Tablet(s) Oral at bedtime    MEDICATIONS  (PRN):  acetaminophen     Tablet .. 650 milliGRAM(s) Oral every 6 hours PRN Mild Pain (1 - 3)  dextrose Oral Gel 15 Gram(s) Oral once PRN Blood Glucose LESS THAN 70 milliGRAM(s)/deciliter  furosemide    Tablet 40 milliGRAM(s) Oral daily PRN fluid overload  oxyCODONE    IR 5 milliGRAM(s) Oral every 6 hours PRN Moderate Pain (4 - 6)        RECENT LABS/IMAGING      POCT Blood Glucose.: 223 mg/dL (01-13-25 @ 11:48)  POCT Blood Glucose.: 173 mg/dL (01-13-25 @ 07:40)  POCT Blood Glucose.: 197 mg/dL (01-12-25 @ 21:10)  POCT Blood Glucose.: 160 mg/dL (01-12-25 @ 16:40)  POCT Blood Glucose.: 242 mg/dL (01-12-25 @ 12:04)  POCT Blood Glucose.: 144 mg/dL (01-11-25 @ 21:05)  POCT Blood Glucose.: 171 mg/dL (01-11-25 @ 16:40)  POCT Blood Glucose.: 158 mg/dL (01-11-25 @ 11:14)  POCT Blood Glucose.: 154 mg/dL (01-11-25 @ 07:31)  POCT Blood Glucose.: 145 mg/dL (01-10-25 @ 20:55)  POCT Blood Glucose.: 193 mg/dL (01-10-25 @ 16:11)  POCT Blood Glucose.: 230 mg/dL (01-10-25 @ 12:44)                          12.4   9.78  )-----------( 325      ( 08 Jan 2025 07:08 )             37.3     01-08    143  |  105  |  16  ----------------------------<  148[H]  4.4   |  25  |  0.5[L]    Ca    8.6      08 Jan 2025 07:08  Mg     1.9     01-08    TPro  6.0  /  Alb  3.3[L]  /  TBili  0.5  /  DBili  x   /  AST  21  /  ALT  19  /  AlkPhos  68  01-08     Patient is a 64y old  Female who presents with a chief complaint of Rehab of Multiple trauma/ right hip fracture (07 Jan 2025 13:07)      HPI:  Patient is a 65 yo female with PMHx of Afib on eliquis, HTN, DM, CHB wiht PPM, schizophrenia who presented to the ED on 01/02/25 after an unwitnessed fall with no head strike and no LOC. Patient was found on the ground outside of her apartment for an unknown period of time by her   who was there to  the patient for a PCP visit. Denied dizziness, tachycardia, diaphoresis prior to fall. Post- fall patient endorsed right hip pain, Of note, patient suffered a ground level fall 1 week prior 2/2 LE weakness however eloped prior to receiving medical tx. Imaging in ED showed:    CT HEAD:  No acute intracranial findings.  Stable mild-moderate chronic microvascular ischemic changes.    CT CERVICAL SPINE:  No acute cervical fracture or facet subluxation.    CT abdomen and pelvis w IV contrast:  Acute, displaced right femoral neck fracture.  Acute, nondisplaced right anterior 6-7th rib fracture; no pneumothorax.    XRAY tibia fibula:  No acute displaced fracture. Osteopenia.    Patient was evaluated by orthopedics and is s/p R hip hemiarthroplasty on 1/3 and WBAT RLE.     Prior to admission, the patient was independent in ADLs, had assistance from family and friends for iADLs, and ambulated without an assistive device. Patient now requires Vanessa for bed mobility, Vanessa for sit-stand transfer, ambulate 5' x2 Vanessa with RW, modA for bathing, CGA for UBD, maxA for LBD, CGA for toileting 2/2 to R hip hemiarthroplasty. Therefore, there is a significant functional decline from baseline. Patients also with medical comorbidities of Afib, HTN, DM requiring medication management and monitoring of vitals by Physiatry team and nursing. There are significant comorbidities which warrants acute rehab hospitalization. To return home it is in the patient’s best interest to have acute inpatient rehabilitative services to undergo intensive interdisciplinary therapy. Of note, the patient lives alone and would have difficulty performing ADLs and iADLs individually. Furthermore, the patient is motivated and is able to tolerate up to 3 hours of daily therapy for 5-6 days a week for a total of 15 hours a week. Evaluated by Physiatry and deemed to be an excellent candidate for admission to  for acute inpatient rehab. Admitted to Acute Rehab on 1/7/25.     LS: lives alone in sixth floor apt with elevator access and 1 MITCHELL  PLOF: Independent in ADLs. Assistance from family/friends for iADLs. Cane for ambulation.      (07 Jan 2025 13:07)      TODAY'S SUBJECTIVE & REVIEW OF SYMPTOMS:  Patient seen at bedside. Offers no complaints. No events overnight. Pain is well controlled. Pt intermittently refuses meds such as Eliquis and laxative/therapies however is becoming more compliant- patient repeatedly educated. Vitals reviewed. Constipated.   Patient's ACT  at bedside reports patient has schizoaffective disorder requiring injections of prolixin 37.5mg every 2 weeks, which she has not received for a 1-2 doses now.      ROS:     Constitutional    [  x ] WNL           [   ] poor appetite   [   ] insomnia   [   ] tired   Cardio:                [ x  ] WNL           [   ] CP   [   ] WARD   [   ] palpitations               Resp:                   [ x  ] WNL           [   ] SOB   [   ] cough   [   ] wheezing   GI:                        [   ] WNL           [  x ] constipation   [   ] diarrhea   [   ] abdominal pain   [   ] nausea   [   ] emesis                                :                      [x   ] WNL           [   ] PATEL  [   ] dysuria   [   ] difficulty voiding             Endo:                   [x   ] WNL          [   ] po;yuria   [   ] temperature intolerance                 Skin:                     [ x  ] WNL          [   ] pain   [   ] wound   [   ] rash   MSK:                    [   ] WNL          [   ] muscle pain   [  x ] joint pain/ stiffness   [x   ] muscle tenderness right hip   [   ] swelling   Neuro:                 [x   ] WNL          [   ] HA   [   ] change in vision   [   ] tremor   [   ] weakness   [   ]dysphagia              Cognitive:           [ x  ] WNL           [   ]confusion      Psych:                  [ x  ] WNL           [   ] hallucinations   [   ]agitation   [   ] delusion   [   ]depression      PHYSICAL EXAM    Vital Signs Last 24 Hrs  T(C): 36.7 (14 Jan 2025 12:21), Max: 36.8 (13 Jan 2025 13:03)  T(F): 98.1 (14 Jan 2025 12:21), Max: 98.3 (14 Jan 2025 05:28)  HR: 98 (14 Jan 2025 12:21) (76 - 98)  BP: 126/84 (14 Jan 2025 12:21) (126/84 - 149/73)  BP(mean): 93 (13 Jan 2025 19:39) (93 - 93)  RR: 18 (14 Jan 2025 12:21) (18 - 18)  SpO2: 95% (14 Jan 2025 05:28) (95% - 98%)    Parameters below as of 14 Jan 2025 05:28  Patient On (Oxygen Delivery Method): room air    General:[  x ] NAD, Resting Comfortable,   [   ] other:                                HEENT: [ x  ] NC/AT, EOMI, PERRL , Normal Conjunctivae,   [   ] other:  Cardio: [  x ] RRR, no murmer,   [   ] other:                              Pulm: [ x  ] No Respiratory Distress,  Lungs CTAB,   [   ] other:                       Abdomen: [ x  ]ND/NT, Soft,   [   ] other:    : [ x  ] NO PATEL CATHETER, [   ] PATEL CATHETER- no meatal tear, no discharge, [   ] other:                                            MSK: [   ] No joint swelling, Full ROM,   [x   ] other: R hip, limited ROM 2/2 pain                                    Ext: [ x  ]No C/C/E, No calf tenderness,   [   ]other:    Skin: [   ]intact,   [   x] other: R hip surgical site open to air, clean, no erythema , or discharge  few b/l LE abrasions/scabs                                                                  Neurological Examination:  Cognitive: [ x   ] AAO x 3,   [    ]  other:                                                                      Attention:  [ x   ] intact,   [    ]  other:                               Mood/Affect: [ x   ] wnl,    [    ]  other:                                                                             Communication: [ x   ]Fluent, no dysarthria, following commands:  [    ] other:   CN II - XII:  [ x   ] intact,  [    ] other:                                                                                        Motor:   RIGHT UE: [  x ] WNL,  [   ] other:  LEFT    UE: [  x ] WNL,  [   ] other:  RIGHT LE: [   ] WNL,  [ x  ] other:  3-/5 except distally PF/DF 4+/5  LEFT    LE: [x   ] WNL,  [   ] other:    Tone: [  x  ] wnl,   [    ]  other:  DTRs: [  x ]symmetric, [   ] other:                                                                           Sensory: [   x ] Intact to light touch,   [    ] other:      MEDICATIONS  (STANDING):  apixaban 5 milliGRAM(s) Oral two times a day  atorvastatin 80 milliGRAM(s) Oral at bedtime  calcium carbonate   1250 mG (OsCal) 1 Tablet(s) Oral two times a day  carvedilol 3.125 milliGRAM(s) Oral every 12 hours  chlorhexidine 2% Cloths 1 Application(s) Topical <User Schedule>  dextrose 5%. 1000 milliLiter(s) (100 mL/Hr) IV Continuous <Continuous>  dextrose 5%. 1000 milliLiter(s) (50 mL/Hr) IV Continuous <Continuous>  dextrose 50% Injectable 25 Gram(s) IV Push once  dextrose 50% Injectable 12.5 Gram(s) IV Push once  dextrose 50% Injectable 25 Gram(s) IV Push once  ergocalciferol 35921 Unit(s) Oral every week  ezetimibe 10 milliGRAM(s) Oral daily  glucagon  Injectable 1 milliGRAM(s) IntraMuscular once  melatonin 10 milliGRAM(s) Oral at bedtime  polyethylene glycol 3350 17 Gram(s) Oral two times a day  senna 2 Tablet(s) Oral at bedtime    MEDICATIONS  (PRN):  acetaminophen     Tablet .. 650 milliGRAM(s) Oral every 6 hours PRN Mild Pain (1 - 3)  dextrose Oral Gel 15 Gram(s) Oral once PRN Blood Glucose LESS THAN 70 milliGRAM(s)/deciliter  furosemide    Tablet 40 milliGRAM(s) Oral daily PRN fluid overload  oxyCODONE    IR 5 milliGRAM(s) Oral every 6 hours PRN Moderate Pain (4 - 6)        RECENT LABS/IMAGING      POCT Blood Glucose.: 223 mg/dL (01-13-25 @ 11:48)  POCT Blood Glucose.: 173 mg/dL (01-13-25 @ 07:40)  POCT Blood Glucose.: 197 mg/dL (01-12-25 @ 21:10)  POCT Blood Glucose.: 160 mg/dL (01-12-25 @ 16:40)  POCT Blood Glucose.: 242 mg/dL (01-12-25 @ 12:04)  POCT Blood Glucose.: 144 mg/dL (01-11-25 @ 21:05)  POCT Blood Glucose.: 171 mg/dL (01-11-25 @ 16:40)  POCT Blood Glucose.: 158 mg/dL (01-11-25 @ 11:14)  POCT Blood Glucose.: 154 mg/dL (01-11-25 @ 07:31)  POCT Blood Glucose.: 145 mg/dL (01-10-25 @ 20:55)  POCT Blood Glucose.: 193 mg/dL (01-10-25 @ 16:11)  POCT Blood Glucose.: 230 mg/dL (01-10-25 @ 12:44)                          12.4   9.78  )-----------( 325      ( 08 Jan 2025 07:08 )             37.3     01-08    143  |  105  |  16  ----------------------------<  148[H]  4.4   |  25  |  0.5[L]    Ca    8.6      08 Jan 2025 07:08  Mg     1.9     01-08    TPro  6.0  /  Alb  3.3[L]  /  TBili  0.5  /  DBili  x   /  AST  21  /  ALT  19  /  AlkPhos  68  01-08     Patient is a 64y old  Female who presents with a chief complaint of Rehab of Multiple trauma/ right hip fracture (07 Jan 2025 13:07)      HPI:  Patient is a 63 yo female with PMHx of Afib on eliquis, HTN, DM, CHB wiht PPM, schizophrenia who presented to the ED on 01/02/25 after an unwitnessed fall with no head strike and no LOC. Patient was found on the ground outside of her apartment for an unknown period of time by her   who was there to  the patient for a PCP visit. Denied dizziness, tachycardia, diaphoresis prior to fall. Post- fall patient endorsed right hip pain, Of note, patient suffered a ground level fall 1 week prior 2/2 LE weakness however eloped prior to receiving medical tx. Imaging in ED showed:    CT HEAD:  No acute intracranial findings.  Stable mild-moderate chronic microvascular ischemic changes.    CT CERVICAL SPINE:  No acute cervical fracture or facet subluxation.    CT abdomen and pelvis w IV contrast:  Acute, displaced right femoral neck fracture.  Acute, nondisplaced right anterior 6-7th rib fracture; no pneumothorax.    XRAY tibia fibula:  No acute displaced fracture. Osteopenia.    Patient was evaluated by orthopedics and is s/p R hip hemiarthroplasty on 1/3 and WBAT RLE.     Prior to admission, the patient was independent in ADLs, had assistance from family and friends for iADLs, and ambulated without an assistive device. Patient now requires Vanessa for bed mobility, Vanessa for sit-stand transfer, ambulate 5' x2 Vanessa with RW, modA for bathing, CGA for UBD, maxA for LBD, CGA for toileting 2/2 to R hip hemiarthroplasty. Therefore, there is a significant functional decline from baseline. Patients also with medical comorbidities of Afib, HTN, DM requiring medication management and monitoring of vitals by Physiatry team and nursing. There are significant comorbidities which warrants acute rehab hospitalization. To return home it is in the patient’s best interest to have acute inpatient rehabilitative services to undergo intensive interdisciplinary therapy. Of note, the patient lives alone and would have difficulty performing ADLs and iADLs individually. Furthermore, the patient is motivated and is able to tolerate up to 3 hours of daily therapy for 5-6 days a week for a total of 15 hours a week. Evaluated by Physiatry and deemed to be an excellent candidate for admission to  for acute inpatient rehab. Admitted to Acute Rehab on 1/7/25.     LS: lives alone in sixth floor apt with elevator access and 1 MITCHELL  PLOF: Independent in ADLs. Assistance from family/friends for iADLs. Cane for ambulation.      (07 Jan 2025 13:07)      TODAY'S SUBJECTIVE & REVIEW OF SYMPTOMS:  Patient seen at bedside. Offers no complaints. No events overnight. Pain is well controlled. Pt intermittently refuses meds such as Eliquis and laxative/therapies however is becoming more compliant- patient repeatedly educated. Vitals reviewed. Constipated.   Patient's ACT  at bedside reports patient has schizoaffective disorder requiring injections of prolixin 37.5mg every 2 weeks, which she has not received for a 1-2 doses now.      ROS:     Constitutional    [  x ] WNL           [   ] poor appetite   [   ] insomnia   [   ] tired   Cardio:                [ x  ] WNL           [   ] CP   [   ] WARD   [   ] palpitations               Resp:                   [ x  ] WNL           [   ] SOB   [   ] cough   [   ] wheezing   GI:                        [   ] WNL           [  x ] constipation   [   ] diarrhea   [   ] abdominal pain   [   ] nausea   [   ] emesis                                :                      [x   ] WNL           [   ] PATEL  [   ] dysuria   [   ] difficulty voiding             Endo:                   [x   ] WNL          [   ] po;yuria   [   ] temperature intolerance                 Skin:                     [ x  ] WNL          [   ] pain   [   ] wound   [   ] rash   MSK:                    [   ] WNL          [   ] muscle pain   [  x ] joint pain/ stiffness   [x   ] muscle tenderness right hip   [   ] swelling   Neuro:                 [x   ] WNL          [   ] HA   [   ] change in vision   [   ] tremor   [   ] weakness   [   ]dysphagia              Cognitive:           [ x  ] WNL           [   ]confusion      Psych:                  [ x  ] WNL           [   ] hallucinations   [   ]agitation   [   ] delusion   [   ]depression      PHYSICAL EXAM    Vital Signs Last 24 Hrs  T(C): 36.7 (14 Jan 2025 12:21), Max: 36.8 (13 Jan 2025 13:03)  T(F): 98.1 (14 Jan 2025 12:21), Max: 98.3 (14 Jan 2025 05:28)  HR: 98 (14 Jan 2025 12:21) (76 - 98)  BP: 126/84 (14 Jan 2025 12:21) (126/84 - 149/73)  BP(mean): 93 (13 Jan 2025 19:39) (93 - 93)  RR: 18 (14 Jan 2025 12:21) (18 - 18)  SpO2: 95% (14 Jan 2025 05:28) (95% - 98%)    Parameters below as of 14 Jan 2025 05:28  Patient On (Oxygen Delivery Method): room air    General:[  x ] NAD, Resting Comfortable,   [   ] other:                                HEENT: [ x  ] NC/AT, EOMI, PERRL , Normal Conjunctivae,   [   ] other:  Cardio: [  x ] RRR, no murmer,   [   ] other:                              Pulm: [ x  ] No Respiratory Distress,  Lungs CTAB,   [   ] other:                       Abdomen: [ x  ]ND/NT, Soft,   [   ] other:    : [ x  ] NO PATEL CATHETER, [   ] PATEL CATHETER- no meatal tear, no discharge, [   ] other:                                            MSK: [   ] No joint swelling, Full ROM,   [x   ] other: R hip, limited ROM 2/2 pain                                    Ext: [ x  ]No C/C/E, No calf tenderness,   [   ]other:    Skin: [   ]intact,   [   x] other: R hip surgical site open to air, clean, no erythema , or discharge  few b/l LE abrasions/scabs                                                                  Neurological Examination:  Cognitive: [ x   ] AAO x 3,   [    ]  other:                                                                      Attention:  [ x   ] intact,   [    ]  other:                               Mood/Affect: [   ] wnl,    [  x  ]  other:   blunted, impaired initiation                                                                          Communication: [ x   ]Fluent, no dysarthria, following commands:  [  x  ] other: psychomotor slowing  CN II - XII:  [ x   ] intact,  [    ] other:                                                                                        Motor:   RIGHT UE: [  x ] WNL,  [   ] other:  LEFT    UE: [  x ] WNL,  [   ] other:  RIGHT LE: [   ] WNL,  [ x  ] other:  3-/5 except distally PF/DF 4+/5  LEFT    LE: [x   ] WNL,  [   ] other:    Tone: [  x  ] wnl,   [    ]  other:  DTRs: [  x ]symmetric, [   ] other:                                                                           Sensory: [   x ] Intact to light touch,   [    ] other:      MEDICATIONS  (STANDING):  apixaban 5 milliGRAM(s) Oral two times a day  atorvastatin 80 milliGRAM(s) Oral at bedtime  calcium carbonate   1250 mG (OsCal) 1 Tablet(s) Oral two times a day  carvedilol 3.125 milliGRAM(s) Oral every 12 hours  chlorhexidine 2% Cloths 1 Application(s) Topical <User Schedule>  dextrose 5%. 1000 milliLiter(s) (100 mL/Hr) IV Continuous <Continuous>  dextrose 5%. 1000 milliLiter(s) (50 mL/Hr) IV Continuous <Continuous>  dextrose 50% Injectable 25 Gram(s) IV Push once  dextrose 50% Injectable 12.5 Gram(s) IV Push once  dextrose 50% Injectable 25 Gram(s) IV Push once  ergocalciferol 46378 Unit(s) Oral every week  ezetimibe 10 milliGRAM(s) Oral daily  glucagon  Injectable 1 milliGRAM(s) IntraMuscular once  melatonin 10 milliGRAM(s) Oral at bedtime  polyethylene glycol 3350 17 Gram(s) Oral two times a day  senna 2 Tablet(s) Oral at bedtime    MEDICATIONS  (PRN):  acetaminophen     Tablet .. 650 milliGRAM(s) Oral every 6 hours PRN Mild Pain (1 - 3)  dextrose Oral Gel 15 Gram(s) Oral once PRN Blood Glucose LESS THAN 70 milliGRAM(s)/deciliter  furosemide    Tablet 40 milliGRAM(s) Oral daily PRN fluid overload  oxyCODONE    IR 5 milliGRAM(s) Oral every 6 hours PRN Moderate Pain (4 - 6)        RECENT LABS/IMAGING      POCT Blood Glucose.: 223 mg/dL (01-13-25 @ 11:48)  POCT Blood Glucose.: 173 mg/dL (01-13-25 @ 07:40)  POCT Blood Glucose.: 197 mg/dL (01-12-25 @ 21:10)  POCT Blood Glucose.: 160 mg/dL (01-12-25 @ 16:40)  POCT Blood Glucose.: 242 mg/dL (01-12-25 @ 12:04)  POCT Blood Glucose.: 144 mg/dL (01-11-25 @ 21:05)  POCT Blood Glucose.: 171 mg/dL (01-11-25 @ 16:40)  POCT Blood Glucose.: 158 mg/dL (01-11-25 @ 11:14)  POCT Blood Glucose.: 154 mg/dL (01-11-25 @ 07:31)  POCT Blood Glucose.: 145 mg/dL (01-10-25 @ 20:55)  POCT Blood Glucose.: 193 mg/dL (01-10-25 @ 16:11)  POCT Blood Glucose.: 230 mg/dL (01-10-25 @ 12:44)                          12.4   9.78  )-----------( 325      ( 08 Jan 2025 07:08 )             37.3     01-08    143  |  105  |  16  ----------------------------<  148[H]  4.4   |  25  |  0.5[L]    Ca    8.6      08 Jan 2025 07:08  Mg     1.9     01-08    TPro  6.0  /  Alb  3.3[L]  /  TBili  0.5  /  DBili  x   /  AST  21  /  ALT  19  /  AlkPhos  68  01-08

## 2025-01-15 DIAGNOSIS — F17.210 NICOTINE DEPENDENCE, CIGARETTES, UNCOMPLICATED: ICD-10-CM

## 2025-01-15 DIAGNOSIS — E11.9 TYPE 2 DIABETES MELLITUS WITHOUT COMPLICATIONS: ICD-10-CM

## 2025-01-15 DIAGNOSIS — Z60.2 PROBLEMS RELATED TO LIVING ALONE: ICD-10-CM

## 2025-01-15 DIAGNOSIS — S72.011A UNSPECIFIED INTRACAPSULAR FRACTURE OF RIGHT FEMUR, INITIAL ENCOUNTER FOR CLOSED FRACTURE: ICD-10-CM

## 2025-01-15 DIAGNOSIS — E55.9 VITAMIN D DEFICIENCY, UNSPECIFIED: ICD-10-CM

## 2025-01-15 DIAGNOSIS — I95.9 HYPOTENSION, UNSPECIFIED: ICD-10-CM

## 2025-01-15 DIAGNOSIS — S22.41XA MULTIPLE FRACTURES OF RIBS, RIGHT SIDE, INITIAL ENCOUNTER FOR CLOSED FRACTURE: ICD-10-CM

## 2025-01-15 DIAGNOSIS — Z79.899 OTHER LONG TERM (CURRENT) DRUG THERAPY: ICD-10-CM

## 2025-01-15 DIAGNOSIS — F20.9 SCHIZOPHRENIA, UNSPECIFIED: ICD-10-CM

## 2025-01-15 DIAGNOSIS — J98.11 ATELECTASIS: ICD-10-CM

## 2025-01-15 DIAGNOSIS — I34.0 NONRHEUMATIC MITRAL (VALVE) INSUFFICIENCY: ICD-10-CM

## 2025-01-15 DIAGNOSIS — Z88.0 ALLERGY STATUS TO PENICILLIN: ICD-10-CM

## 2025-01-15 DIAGNOSIS — I48.0 PAROXYSMAL ATRIAL FIBRILLATION: ICD-10-CM

## 2025-01-15 DIAGNOSIS — Z79.01 LONG TERM (CURRENT) USE OF ANTICOAGULANTS: ICD-10-CM
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
## 2025-01-15 DIAGNOSIS — I10 ESSENTIAL (PRIMARY) HYPERTENSION: ICD-10-CM

## 2025-01-15 DIAGNOSIS — F31.70 BIPOLAR DISORDER, CURRENTLY IN REMISSION, MOST RECENT EPISODE UNSPECIFIED: ICD-10-CM

## 2025-01-15 DIAGNOSIS — Z90.710 ACQUIRED ABSENCE OF BOTH CERVIX AND UTERUS: ICD-10-CM

## 2025-01-15 DIAGNOSIS — I44.2 ATRIOVENTRICULAR BLOCK, COMPLETE: ICD-10-CM

## 2025-01-15 DIAGNOSIS — Z95.0 PRESENCE OF CARDIAC PACEMAKER: ICD-10-CM

## 2025-01-15 DIAGNOSIS — Z79.82 LONG TERM (CURRENT) USE OF ASPIRIN: ICD-10-CM

## 2025-01-15 DIAGNOSIS — S72.091A OTHER FRACTURE OF HEAD AND NECK OF RIGHT FEMUR, INITIAL ENCOUNTER FOR CLOSED FRACTURE: ICD-10-CM
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
## 2025-01-15 LAB
GLUCOSE BLDC GLUCOMTR-MCNC: 160 MG/DL — HIGH (ref 70–99)
GLUCOSE BLDC GLUCOMTR-MCNC: 229 MG/DL — HIGH (ref 70–99)

## 2025-01-15 RX ADMIN — Medication 17 GRAM(S): at 06:08

## 2025-01-15 RX ADMIN — CALCIUM CARBONATE 1 TABLET(S): 750 TABLET, CHEWABLE ORAL at 06:08

## 2025-01-15 RX ADMIN — APIXABAN 5 MILLIGRAM(S): 5 TABLET, FILM COATED ORAL at 06:09

## 2025-01-15 RX ADMIN — CHLORHEXIDINE GLUCONATE 1 APPLICATION(S): 1.2 RINSE ORAL at 06:08

## 2025-01-15 RX ADMIN — CARVEDILOL 3.12 MILLIGRAM(S): 25 TABLET, FILM COATED ORAL at 06:08

## 2025-01-15 RX ADMIN — CARVEDILOL 3.12 MILLIGRAM(S): 25 TABLET, FILM COATED ORAL at 17:59

## 2025-01-15 SDOH — SOCIAL STABILITY - SOCIAL INSECURITY: PROBLEMS RELATED TO LIVING ALONE: Z60.2

## 2025-01-15 NOTE — PROGRESS NOTE ADULT - ATTENDING COMMENTS
Patient seen and examined with the resident. We discussed the case. I have directed the care. I edited the note. The patient requires acute rehab with 3 hours of daily therapies at least 5 out of 7 days and close physiatry follow up.
I reviewed the chart and examined the patient with the resident and we discussed the findings and treatment plan.  The patient is tolerating the rehab program well. I agree with the findings and treatment plan above, which I modified as indicated. The patient requires 3 hrs a day of acute inpatient rehab. VSS. Labs reviewed. No new complaints. PE unchanged. Full rehab eval in progress. A1C noted. Patient has preDM and controlled on no meds. BP running high. Will monitor. Continue full rehab program.    I read, edited and agree with the physical exam above and assessment:  #Rehab of right femoral neck fracture 2/2 mechanical fall s/p R hip hemiarthroplasty with  gait abnormality and decline in function  #Right anterior 6-7th rib fx   #Multiple BLE abrasions  - CT Chest: Acute, displaced right femoral neck fracture; Acute, nondisplaced right anterior 6-7th rib fracture; no pneumothorax.  - WBAT RLE  -Pain control with tylenol prn and oxycodone 5mg prn    -PT/OT Eval and treat: patient requires 3 hrs daily of interdisciplinary inpatient rehab at least 5 days a week.     #Vitamin D deficiency   - continue with calcium and vitamin D supplementation     #Paroxysmal Afib on eliquis  #CHB with PPM  -evaluated by electrophysiology on admission: follow up outpatient  - continue with home med Eliquis     #HTN  -continue with coreg 3.125mg bid   -monitor BP and adjust medication as needed    prediabetes  -A1C 6.1  -patient understand the benefits of glycemic control and the risks of hyperglycemia and uncontrolled diabates however at this point in time she delcines treatment for DM and hyperglycemia, does not want insulin.     #HLD  -continue with lipitor 80mg qhs  -continue with ezetimibe 10mg daily     #tobacco dependence   -1PPD for past 40 years  - declining nicotine patch at this time   - offer counseling     -Pain control: prn tylenol and prn oxycodone     -GI/Bowel Mgmt: miralax bid      -Skin:  No active issues at this time      - Diet: DASH/ CC    Precautions / PROPHYLAXIS:      - Falls    - Ortho: Weight bearing status: WBAT RLE      - DVT prophylaxis: Eliquis
Rehab of right femoral neck fracture 2/2 mechanical fall s/p R hip hemiarthroplasty. Patient seen and examined with the resident. The treatment plan discussed. Agree with the above.
I reviewed the chart and examined the patient with the resident and we discussed the findings and treatment plan.  The patient is tolerating the rehab program well. I agree with the findings and treatment plan above, which I modified as indicated. The patient requires 3 hrs a day of acute inpatient rehab. No new complaints. Alert. VSS. Hip incision C&D. No edema. Needs encouragement to complete therapies, but participating and making gains. Continue full rehab program.     I read, edited and agree with the physical exam above and assessment:  #Rehab of right femoral neck fracture 2/2 mechanical fall s/p R hip hemiarthroplasty with  gait abnormality and decline in function  #Right anterior 6-7th rib fx   #Multiple BLE abrasions  - CT Chest: Acute, displaced right femoral neck fracture; Acute, nondisplaced right anterior 6-7th rib fracture; no pneumothorax.  - WBAT RLE  -Pain control with tylenol prn and oxycodone 5mg prn  - continue full rehab program    #Vitamin D deficiency   - continue with calcium and vitamin D supplementation     #Paroxysmal Afib on eliquis  #CHB with PPM  -patient is pacemaker dependent   -evaluated by electrophysiology on admission: follow up outpatient  - continue with home med Eliquis   - rate controlled  - had brief unresponsive episode in chair on 1/9. EPS interrogated the PPM, noted no events. .   possible seizure or orthostatic episode . Monitor.    #HTN  -continue with coreg 3.125mg bid   -monitor BP and adjust medication as needed    prediabetes  -A1C 6.1  -patient understand the benefits of glycemic control and the risks of hyperglycemia and uncontrolled diabates however at this point in time she delcines treatment for DM and hyperglycemia, does not want insulin.     #HLD  -continue with lipitor 80mg qhs  -continue with ezetimibe 10mg daily     #Schizophrenia  - no evidence of active psychosis  - cooperative with therapy, though not always compliant with taking meds.  - monitor, Psych consult prn    #tobacco dependence   -1PPD for past 40 years  - declining nicotine patch at this time   - offer counseling     -Pain control: prn tylenol and prn oxycodone     -GI/Bowel Mgmt: miralax bid      -Skin:  monitor incision
I reviewed the chart and examined the patient with the resident and we discussed the findings and treatment plan.  The patient is tolerating the rehab program well. I agree with the findings and treatment plan above, which I modified as indicated. The patient requires 3 hrs a day of acute inpatient rehab. No complaints. VSS. Having some hip pain with ambulation. Ambulates with RW with CG. Needs encouragement. Randomly refuses meds, especially in the evening.  spoke with soc. svc. at [facility], where she is followed. She in overdue for her monthly IM Prolixin. Will order. Continue full rehab program. She is unsafe to go home alone at this time and more appropriate for STR. Case management working on D/C planning.     I read, edited and agree with the physical exam above and assessment:  #Rehab of right femoral neck fracture 2/2 mechanical fall s/p R hip hemiarthroplasty with  gait abnormality and decline in function  #Right anterior 6-7th rib fx   #Multiple BLE abrasions  - CT Chest: Acute, displaced right femoral neck fracture; Acute, nondisplaced right anterior 6-7th rib fracture; no pneumothorax.  - WBAT RLE  -Pain control with tylenol prn and oxycodone 5mg prn  - continue full rehab program    #Vitamin D deficiency   - continue with calcium and vitamin D supplementation     #Paroxysmal Afib on eliquis  #CHB with PPM  -patient has been refusing eliquis at times, despite understanding risks  -patient is pacemaker dependent   -evaluated by electrophysiology on admission: follow up outpatient  - continue with home med Eliquis   - rate controlled  - had brief unresponsive episode in chair on 1/9. EPS interrogated the PPM, noted no events. .   possible seizure or orthostatic episode . Monitor.    #HTN  -continue with coreg 3.125mg bid   -monitor BP and adjust medication as needed    prediabetes  -A1C 6.1  -patient understand the benefits of glycemic control and the risks of hyperglycemia and uncontrolled diabates however at this point in time she delcines treatment for DM and hyperglycemia, does not want insulin.     #HLD  -continue with lipitor 80mg qhs  -continue with ezetimibe 10mg daily     #schizoaffective disorder   - no evidence of active psychosis  - cooperative with therapy, though not always compliant with taking meds.  - on PTA prolixin 37.5mg every 2 weeks, last dose was 12/20/24   -will give injection today, next dose due 1/28/25   - monitor, Psych consult prn    #tobacco dependence   -1PPD for past 40 years  - declining nicotine patch at this time   - offer counseling     -Pain control: prn tylenol and prn oxycodone     -GI/Bowel Mgmt: miralax bid
I reviewed the chart and examined the patient with the resident and we discussed the findings and treatment plan.  The patient is tolerating the rehab program well. I agree with the findings and treatment plan above, which I modified as indicated. The patient requires 3 hrs a day of acute inpatient rehab. No new complaints. VSS. Participating well in therapies. Pain fairly well controlled. Making slow functional gains. Continue acute rehab. Possible d/c to STR in am.    I read, edited and agree with the physical exam above and assessment:  #Rehab of right femoral neck fracture 2/2 mechanical fall s/p R hip hemiarthroplasty with  gait abnormality and decline in function  #Right anterior 6-7th rib fx   #Multiple BLE abrasions  - CT Chest: Acute, displaced right femoral neck fracture; Acute, nondisplaced right anterior 6-7th rib fracture; no pneumothorax.  - WBAT RLE  -Pain control with tylenol prn and oxycodone 5mg prn  - continue full rehab program. Possible d/c to STR in am    #Vitamin D deficiency   - continue with calcium and vitamin D supplementation     #Paroxysmal Afib on Eliquis  #CHB with PPM  -patient has been refusing eliquis despite understanding risks  -patient is pacemaker dependent   -evaluated by electrophysiology on admission: follow up outpatient  - continue with home med Eliquis   - rate controlled  - had brief unresponsive episode in chair on 1/9. EPS interrogated the PPM, noted no events. .   possible seizure or orthostatic episode . Monitor. None since.    #HTN  -continue with coreg 3.125mg bid   -monitor BP and adjust medication as needed    prediabetes  -A1C 6.1  -patient understand the benefits of glycemic control and the risks of hyperglycemia and uncontrolled diabates however at this point in time she delcines treatment for DM and hyperglycemia, does not want insulin.     #HLD  -continue with lipitor 80mg qhs  -continue with ezetimibe 10mg daily     #schizoaffective disorder   - no evidence of active psychosis  - cooperative with therapy, though not always compliant with taking meds.  - on PTA prolixin 37.5mg every 2 weeks, last dose was 12/20/24   -will give injection today pending supply from pharmacy, next dose due 1/29/25   - monitor, Psych consult prn    #tobacco dependence   -1PPD for past 40 years  - declining nicotine patch at this time   - offer counseling

## 2025-01-15 NOTE — PROGRESS NOTE ADULT - ASSESSMENT
65 yo female with PMHx of Afib on eliquis, HTN, DM, CHB wiht PPM, schizophrenia who presented to the ED on 01/02/25 after an unwitnessed fall with right femoral neck fracture on imaging s/p R hip hemiarthroplasty on 1/3. Admitted to rehab for mulittrauama s/p R hip hemiarthroplasty     #Rehab of right femoral neck fracture 2/2 mechanical fall s/p R hip hemiarthroplasty with  gait abnormality and decline in function  #Right anterior 6-7th rib fx   #Multiple BLE abrasions  - CT Chest: Acute, displaced right femoral neck fracture; Acute, nondisplaced right anterior 6-7th rib fracture; no pneumothorax.  - WBAT RLE  -Pain control with tylenol prn and oxycodone 5mg prn  - continue full rehab program    #Vitamin D deficiency   - continue with calcium and vitamin D supplementation     #Paroxysmal Afib on eliquis  #CHB with PPM  -patient has been refusing eliquis despite understanding risks  -patient is pacemaker dependent   -evaluated by electrophysiology on admission: follow up outpatient  - continue with home med Eliquis   - rate controlled  - had brief unresponsive episode in chair on 1/9. EPS interrogated the PPM, noted no events. .   possible seizure or orthostatic episode . Monitor.    #HTN  -continue with coreg 3.125mg bid   -monitor BP and adjust medication as needed    prediabetes  -A1C 6.1  -patient understand the benefits of glycemic control and the risks of hyperglycemia and uncontrolled diabates however at this point in time she delcines treatment for DM and hyperglycemia, does not want insulin.     #HLD  -continue with lipitor 80mg qhs  -continue with ezetimibe 10mg daily     #schizoaffective disorder   - no evidence of active psychosis  - cooperative with therapy, though not always compliant with taking meds.  - on PTA prolixin 37.5mg every 2 weeks, last dose was 12/20/24   -will give injection today pending supply from pharmacy, next dose due 1/29/25   - monitor, Psych consult prn    #tobacco dependence   -1PPD for past 40 years  - declining nicotine patch at this time   - offer counseling     -Pain control: prn tylenol and prn oxycodone     -GI/Bowel Mgmt: miralax bid      -Skin:  monitor incision      - Diet: DASH/ CC    Precautions / PROPHYLAXIS:      - Falls    - Ortho: Weight bearing status: WBAT RLE      - DVT prophylaxis: Eliquis         65 yo female with PMHx of Afib on eliquis, HTN, DM, CHB wiht PPM, schizophrenia who presented to the ED on 01/02/25 after an unwitnessed fall with right femoral neck fracture on imaging s/p R hip hemiarthroplasty on 1/3. Admitted to rehab for mulittrauama s/p R hip hemiarthroplasty     #Rehab of right femoral neck fracture 2/2 mechanical fall s/p R hip hemiarthroplasty with  gait abnormality and decline in function  #Right anterior 6-7th rib fx   #Multiple BLE abrasions  - CT Chest: Acute, displaced right femoral neck fracture; Acute, nondisplaced right anterior 6-7th rib fracture; no pneumothorax.  - WBAT RLE  -Pain control with tylenol prn and oxycodone 5mg prn  - continue full rehab program. Possible d/c to STR in am    #Vitamin D deficiency   - continue with calcium and vitamin D supplementation     #Paroxysmal Afib on Eliquis  #CHB with PPM  -patient has been refusing eliquis despite understanding risks  -patient is pacemaker dependent   -evaluated by electrophysiology on admission: follow up outpatient  - continue with home med Eliquis   - rate controlled  - had brief unresponsive episode in chair on 1/9. EPS interrogated the PPM, noted no events. .   possible seizure or orthostatic episode . Monitor. None since.    #HTN  -continue with coreg 3.125mg bid   -monitor BP and adjust medication as needed    prediabetes  -A1C 6.1  -patient understand the benefits of glycemic control and the risks of hyperglycemia and uncontrolled diabates however at this point in time she delcines treatment for DM and hyperglycemia, does not want insulin.     #HLD  -continue with lipitor 80mg qhs  -continue with ezetimibe 10mg daily     #schizoaffective disorder   - no evidence of active psychosis  - cooperative with therapy, though not always compliant with taking meds.  - on PTA prolixin 37.5mg every 2 weeks, last dose was 12/20/24   -will give injection today pending supply from pharmacy, next dose due 1/29/25   - monitor, Psych consult prn    #tobacco dependence   -1PPD for past 40 years  - declining nicotine patch at this time   - offer counseling     -Pain control: prn tylenol and prn oxycodone     -GI/Bowel Mgmt: miralax bid      -Skin:  monitor incision      - Diet: DASH/ CC    Precautions / PROPHYLAXIS:      - Falls    - Ortho: Weight bearing status: WBAT RLE      - DVT prophylaxis: Eliquis

## 2025-01-15 NOTE — PROGRESS NOTE ADULT - SUBJECTIVE AND OBJECTIVE BOX
Patient is a 64y old  Female who presents with a chief complaint of Rehab of Multiple trauma/ right hip fracture (07 Jan 2025 13:07)      HPI:  Patient is a 63 yo female with PMHx of Afib on eliquis, HTN, DM, CHB wiht PPM, schizophrenia who presented to the ED on 01/02/25 after an unwitnessed fall with no head strike and no LOC. Patient was found on the ground outside of her apartment for an unknown period of time by her   who was there to  the patient for a PCP visit. Denied dizziness, tachycardia, diaphoresis prior to fall. Post- fall patient endorsed right hip pain, Of note, patient suffered a ground level fall 1 week prior 2/2 LE weakness however eloped prior to receiving medical tx. Imaging in ED showed:    CT HEAD:  No acute intracranial findings.  Stable mild-moderate chronic microvascular ischemic changes.    CT CERVICAL SPINE:  No acute cervical fracture or facet subluxation.    CT abdomen and pelvis w IV contrast:  Acute, displaced right femoral neck fracture.  Acute, nondisplaced right anterior 6-7th rib fracture; no pneumothorax.    XRAY tibia fibula:  No acute displaced fracture. Osteopenia.    Patient was evaluated by orthopedics and is s/p R hip hemiarthroplasty on 1/3 and WBAT RLE.     Prior to admission, the patient was independent in ADLs, had assistance from family and friends for iADLs, and ambulated without an assistive device. Patient now requires Vanessa for bed mobility, Vanessa for sit-stand transfer, ambulate 5' x2 Vanessa with RW, modA for bathing, CGA for UBD, maxA for LBD, CGA for toileting 2/2 to R hip hemiarthroplasty. Therefore, there is a significant functional decline from baseline. Patients also with medical comorbidities of Afib, HTN, DM requiring medication management and monitoring of vitals by Physiatry team and nursing. There are significant comorbidities which warrants acute rehab hospitalization. To return home it is in the patient’s best interest to have acute inpatient rehabilitative services to undergo intensive interdisciplinary therapy. Of note, the patient lives alone and would have difficulty performing ADLs and iADLs individually. Furthermore, the patient is motivated and is able to tolerate up to 3 hours of daily therapy for 5-6 days a week for a total of 15 hours a week. Evaluated by Physiatry and deemed to be an excellent candidate for admission to  for acute inpatient rehab. Admitted to Acute Rehab on 1/7/25.     LS: lives alone in sixth floor apt with elevator access and 1 MITCHELL  PLOF: Independent in ADLs. Assistance from family/friends for iADLs. Cane for ambulation.      (07 Jan 2025 13:07)      TODAY'S SUBJECTIVE & REVIEW OF SYMPTOMS:  Patient seen at bedside. Offers no complaints. No events overnight. Pain is well controlled. Pt intermittently refuses meds such as Eliquis and laxative/therapies however is becoming more compliant- patient repeatedly educated. Vitals reviewed. Constipated.   Patient's ACT  at bedside reports patient has schizoaffective disorder requiring injections of prolixin 37.5mg every 2 weeks, which she has not received for a 1-2 doses now. Pharmacy noted they are out of stock of prolixin and are hoping to a dose ready for the patient by today.     CLOF: partial A for BM and transfers Able to ambulate 60 ft x 2 w/ RW touchA. Not able to navigate steps. ModA for UBD, LBD, toileting      ROS:     Constitutional    [  x ] WNL           [   ] poor appetite   [   ] insomnia   [   ] tired   Cardio:                [ x  ] WNL           [   ] CP   [   ] WARD   [   ] palpitations               Resp:                   [ x  ] WNL           [   ] SOB   [   ] cough   [   ] wheezing   GI:                        [   ] WNL           [  x ] constipation   [   ] diarrhea   [   ] abdominal pain   [   ] nausea   [   ] emesis                                :                      [x   ] WNL           [   ] PATEL  [   ] dysuria   [   ] difficulty voiding             Endo:                   [x   ] WNL          [   ] po;yuria   [   ] temperature intolerance                 Skin:                     [ x  ] WNL          [   ] pain   [   ] wound   [   ] rash   MSK:                    [   ] WNL          [   ] muscle pain   [  x ] joint pain/ stiffness   [x   ] muscle tenderness right hip   [   ] swelling   Neuro:                 [x   ] WNL          [   ] HA   [   ] change in vision   [   ] tremor   [   ] weakness   [   ]dysphagia              Cognitive:           [ x  ] WNL           [   ]confusion      Psych:                  [ x  ] WNL           [   ] hallucinations   [   ]agitation   [   ] delusion   [   ]depression      PHYSICAL EXAM    Vital Signs Last 24 Hrs  T(C): 36.7 (15 Jasper 2025 04:47), Max: 36.8 (14 Jan 2025 19:51)  T(F): 98.1 (15 Jasper 2025 04:47), Max: 98.2 (14 Jan 2025 19:51)  HR: 64 (15 Jasper 2025 04:47) (64 - 98)  BP: 129/69 (15 Jasper 2025 04:47) (126/84 - 150/73)  BP(mean): 98 (14 Jan 2025 19:51) (98 - 98)  RR: 18 (15 Jasper 2025 04:47) (18 - 18)  SpO2: 97% (15 Jasper 2025 04:47) (96% - 97%)    Parameters below as of 15 Jasper 2025 04:47  Patient On (Oxygen Delivery Method): room air    General:[  x ] NAD, Resting Comfortable,   [   ] other:                                HEENT: [ x  ] NC/AT, EOMI, PERRL , Normal Conjunctivae,   [   ] other:  Cardio: [  x ] RRR, no murmer,   [   ] other:                              Pulm: [ x  ] No Respiratory Distress,  Lungs CTAB,   [   ] other:                       Abdomen: [ x  ]ND/NT, Soft,   [   ] other:    : [ x  ] NO PATEL CATHETER, [   ] PATEL CATHETER- no meatal tear, no discharge, [   ] other:                                            MSK: [   ] No joint swelling, Full ROM,   [x   ] other: R hip, limited ROM 2/2 pain                                    Ext: [ x  ]No C/C/E, No calf tenderness,   [   ]other:    Skin: [   ]intact,   [   x] other: R hip surgical site open to air, clean, no erythema , or discharge  few b/l LE abrasions/scabs                                                                  Neurological Examination:  Cognitive: [ x   ] AAO x 3,   [    ]  other:                                                                      Attention:  [ x   ] intact,   [    ]  other:                               Mood/Affect: [   ] wnl,    [  x  ]  other:   blunted, impaired initiation                                                                          Communication: [ x   ]Fluent, no dysarthria, following commands:  [  x  ] other: psychomotor slowing  CN II - XII:  [ x   ] intact,  [    ] other:                                                                                        Motor:   RIGHT UE: [  x ] WNL,  [   ] other:  LEFT    UE: [  x ] WNL,  [   ] other:  RIGHT LE: [   ] WNL,  [ x  ] other:  3-/5 except distally PF/DF 4+/5  LEFT    LE: [x   ] WNL,  [   ] other:    Tone: [  x  ] wnl,   [    ]  other:  DTRs: [  x ]symmetric, [   ] other:                                                                           Sensory: [   x ] Intact to light touch,   [    ] other:      MEDICATIONS  (STANDING):  apixaban 5 milliGRAM(s) Oral two times a day  atorvastatin 80 milliGRAM(s) Oral at bedtime  calcium carbonate   1250 mG (OsCal) 1 Tablet(s) Oral two times a day  carvedilol 3.125 milliGRAM(s) Oral every 12 hours  chlorhexidine 2% Cloths 1 Application(s) Topical <User Schedule>  dextrose 5%. 1000 milliLiter(s) (100 mL/Hr) IV Continuous <Continuous>  dextrose 5%. 1000 milliLiter(s) (50 mL/Hr) IV Continuous <Continuous>  dextrose 50% Injectable 25 Gram(s) IV Push once  dextrose 50% Injectable 12.5 Gram(s) IV Push once  dextrose 50% Injectable 25 Gram(s) IV Push once  ergocalciferol 24741 Unit(s) Oral every week  ezetimibe 10 milliGRAM(s) Oral daily  glucagon  Injectable 1 milliGRAM(s) IntraMuscular once  melatonin 10 milliGRAM(s) Oral at bedtime  polyethylene glycol 3350 17 Gram(s) Oral two times a day  senna 2 Tablet(s) Oral at bedtime    MEDICATIONS  (PRN):  acetaminophen     Tablet .. 650 milliGRAM(s) Oral every 6 hours PRN Mild Pain (1 - 3)  dextrose Oral Gel 15 Gram(s) Oral once PRN Blood Glucose LESS THAN 70 milliGRAM(s)/deciliter  furosemide    Tablet 40 milliGRAM(s) Oral daily PRN fluid overload  oxyCODONE    IR 5 milliGRAM(s) Oral every 6 hours PRN Moderate Pain (4 - 6)        RECENT LABS/IMAGING      POCT Blood Glucose.: 223 mg/dL (01-13-25 @ 11:48)  POCT Blood Glucose.: 173 mg/dL (01-13-25 @ 07:40)  POCT Blood Glucose.: 197 mg/dL (01-12-25 @ 21:10)  POCT Blood Glucose.: 160 mg/dL (01-12-25 @ 16:40)  POCT Blood Glucose.: 242 mg/dL (01-12-25 @ 12:04)  POCT Blood Glucose.: 144 mg/dL (01-11-25 @ 21:05)  POCT Blood Glucose.: 171 mg/dL (01-11-25 @ 16:40)  POCT Blood Glucose.: 158 mg/dL (01-11-25 @ 11:14)  POCT Blood Glucose.: 154 mg/dL (01-11-25 @ 07:31)  POCT Blood Glucose.: 145 mg/dL (01-10-25 @ 20:55)  POCT Blood Glucose.: 193 mg/dL (01-10-25 @ 16:11)  POCT Blood Glucose.: 230 mg/dL (01-10-25 @ 12:44)                          12.4   9.78  )-----------( 325      ( 08 Jan 2025 07:08 )             37.3     01-08    143  |  105  |  16  ----------------------------<  148[H]  4.4   |  25  |  0.5[L]    Ca    8.6      08 Jan 2025 07:08  Mg     1.9     01-08    TPro  6.0  /  Alb  3.3[L]  /  TBili  0.5  /  DBili  x   /  AST  21  /  ALT  19  /  AlkPhos  68  01-08     Patient is a 64y old  Female who presents with a chief complaint of Rehab of Multiple trauma/ right hip fracture (07 Jan 2025 13:07)      HPI:  Patient is a 65 yo female with PMHx of Afib on eliquis, HTN, DM, CHB wiht PPM, schizophrenia who presented to the ED on 01/02/25 after an unwitnessed fall with no head strike and no LOC. Patient was found on the ground outside of her apartment for an unknown period of time by her   who was there to  the patient for a PCP visit. Denied dizziness, tachycardia, diaphoresis prior to fall. Post- fall patient endorsed right hip pain, Of note, patient suffered a ground level fall 1 week prior 2/2 LE weakness however eloped prior to receiving medical tx. Imaging in ED showed:    CT HEAD:  No acute intracranial findings.  Stable mild-moderate chronic microvascular ischemic changes.    CT CERVICAL SPINE:  No acute cervical fracture or facet subluxation.    CT abdomen and pelvis w IV contrast:  Acute, displaced right femoral neck fracture.  Acute, nondisplaced right anterior 6-7th rib fracture; no pneumothorax.    XRAY tibia fibula:  No acute displaced fracture. Osteopenia.    Patient was evaluated by orthopedics and is s/p R hip hemiarthroplasty on 1/3 and WBAT RLE.     Prior to admission, the patient was independent in ADLs, had assistance from family and friends for iADLs, and ambulated without an assistive device. Patient now requires Vanessa for bed mobility, Vanessa for sit-stand transfer, ambulate 5' x2 Vanessa with RW, modA for bathing, CGA for UBD, maxA for LBD, CGA for toileting 2/2 to R hip hemiarthroplasty. Therefore, there is a significant functional decline from baseline. Patients also with medical comorbidities of Afib, HTN, DM requiring medication management and monitoring of vitals by Physiatry team and nursing. There are significant comorbidities which warrants acute rehab hospitalization. To return home it is in the patient’s best interest to have acute inpatient rehabilitative services to undergo intensive interdisciplinary therapy. Of note, the patient lives alone and would have difficulty performing ADLs and iADLs individually. Furthermore, the patient is motivated and is able to tolerate up to 3 hours of daily therapy for 5-6 days a week for a total of 15 hours a week. Evaluated by Physiatry and deemed to be an excellent candidate for admission to  for acute inpatient rehab. Admitted to Acute Rehab on 1/7/25.     LS: lives alone in sixth floor apt with elevator access and 1 MITCHELL  PLOF: Independent in ADLs. Assistance from family/friends for iADLs. Cane for ambulation.      (07 Jan 2025 13:07)      TODAY'S SUBJECTIVE & REVIEW OF SYMPTOMS:  Patient seen at bedside. Offers no complaints. No events overnight. Pain is well controlled. Pt intermittently refuses meds such as Eliquis and laxative/therapies however is becoming more compliant- patient repeatedly educated. Vitals reviewed. Constipated.    patient has schizoaffective disorder requiring injections of prolixin 37.5mg every 2 weeks, which she has not received for a 1-2 doses now. Pharmacy noted they are out of stock of prolixin and are hoping to a dose ready for the patient by today.     CLOF: partial A for BM and transfers Able to ambulate 60 ft x 2 w/ RW touchA. Not able to navigate steps. ModA for UBD, LBD, toileting      ROS:     Constitutional    [  x ] WNL           [   ] poor appetite   [   ] insomnia   [   ] tired   Cardio:                [ x  ] WNL           [   ] CP   [   ] WARD   [   ] palpitations               Resp:                   [ x  ] WNL           [   ] SOB   [   ] cough   [   ] wheezing   GI:                        [   ] WNL           [  x ] constipation   [   ] diarrhea   [   ] abdominal pain   [   ] nausea   [   ] emesis                                :                      [x   ] WNL           [   ] PATEL  [   ] dysuria   [   ] difficulty voiding             Endo:                   [x   ] WNL          [   ] po;yuria   [   ] temperature intolerance                 Skin:                     [ x  ] WNL          [   ] pain   [   ] wound   [   ] rash   MSK:                    [   ] WNL          [   ] muscle pain   [  x ] joint pain/ stiffness   [x   ] muscle tenderness right hip   [   ] swelling   Neuro:                 [x   ] WNL          [   ] HA   [   ] change in vision   [   ] tremor   [   ] weakness   [   ]dysphagia              Cognitive:           [ x  ] WNL           [   ]confusion      Psych:                  [ x  ] WNL           [   ] hallucinations   [   ]agitation   [   ] delusion   [   ]depression      PHYSICAL EXAM    Vital Signs Last 24 Hrs  T(C): 36.7 (15 Jasper 2025 04:47), Max: 36.8 (14 Jan 2025 19:51)  T(F): 98.1 (15 Jasper 2025 04:47), Max: 98.2 (14 Jan 2025 19:51)  HR: 64 (15 Jasper 2025 04:47) (64 - 98)  BP: 129/69 (15 Jasper 2025 04:47) (126/84 - 150/73)  BP(mean): 98 (14 Jan 2025 19:51) (98 - 98)  RR: 18 (15 Jasper 2025 04:47) (18 - 18)  SpO2: 97% (15 Jasper 2025 04:47) (96% - 97%)    Parameters below as of 15 Jasper 2025 04:47  Patient On (Oxygen Delivery Method): room air    General:[  x ] NAD, Resting Comfortable,   [   ] other:                                HEENT: [ x  ] NC/AT, EOMI, PERRL , Normal Conjunctivae,   [   ] other:  Cardio: [  x ] RRR, no murmer,   [   ] other:                              Pulm: [ x  ] No Respiratory Distress,  Lungs CTAB,   [   ] other:                       Abdomen: [ x  ]ND/NT, Soft,   [   ] other:    : [ x  ] NO PATEL CATHETER, [   ] PATEL CATHETER- no meatal tear, no discharge, [   ] other:                                            MSK: [   ] No joint swelling, Full ROM,   [x   ] other: R hip, limited ROM 2/2 pain                                    Ext: [ x  ]No C/C/E, No calf tenderness,   [   ]other:    Skin: [   ]intact,   [   x] other: R hip surgical site open to air, clean, no erythema , or discharge  few b/l LE abrasions/scabs                                                                  Neurological Examination:  Cognitive: [ x   ] AAO x 3,   [    ]  other:                                                                      Attention:  [ x   ] intact,   [    ]  other:                               Mood/Affect: [   ] wnl,    [  x  ]  other:   blunted, impaired initiation                                                                          Communication: [ x   ]Fluent, no dysarthria, following commands:  [  x  ] other: psychomotor slowing  CN II - XII:  [ x   ] intact,  [    ] other:                                                                                        Motor:   RIGHT UE: [  x ] WNL,  [   ] other:  LEFT    UE: [  x ] WNL,  [   ] other:  RIGHT LE: [   ] WNL,  [ x  ] other:  3-/5 except distally PF/DF 4+/5  LEFT    LE: [x   ] WNL,  [   ] other:    Tone: [  x  ] wnl,   [    ]  other:  DTRs: [  x ]symmetric, [   ] other:                                                                           Sensory: [   x ] Intact to light touch,   [    ] other:      MEDICATIONS  (STANDING):  apixaban 5 milliGRAM(s) Oral two times a day  atorvastatin 80 milliGRAM(s) Oral at bedtime  calcium carbonate   1250 mG (OsCal) 1 Tablet(s) Oral two times a day  carvedilol 3.125 milliGRAM(s) Oral every 12 hours  chlorhexidine 2% Cloths 1 Application(s) Topical <User Schedule>  dextrose 5%. 1000 milliLiter(s) (100 mL/Hr) IV Continuous <Continuous>  dextrose 5%. 1000 milliLiter(s) (50 mL/Hr) IV Continuous <Continuous>  dextrose 50% Injectable 25 Gram(s) IV Push once  dextrose 50% Injectable 12.5 Gram(s) IV Push once  dextrose 50% Injectable 25 Gram(s) IV Push once  ergocalciferol 31038 Unit(s) Oral every week  ezetimibe 10 milliGRAM(s) Oral daily  glucagon  Injectable 1 milliGRAM(s) IntraMuscular once  melatonin 10 milliGRAM(s) Oral at bedtime  polyethylene glycol 3350 17 Gram(s) Oral two times a day  senna 2 Tablet(s) Oral at bedtime    MEDICATIONS  (PRN):  acetaminophen     Tablet .. 650 milliGRAM(s) Oral every 6 hours PRN Mild Pain (1 - 3)  dextrose Oral Gel 15 Gram(s) Oral once PRN Blood Glucose LESS THAN 70 milliGRAM(s)/deciliter  furosemide    Tablet 40 milliGRAM(s) Oral daily PRN fluid overload  oxyCODONE    IR 5 milliGRAM(s) Oral every 6 hours PRN Moderate Pain (4 - 6)        RECENT LABS/IMAGING      POCT Blood Glucose.: 223 mg/dL (01-13-25 @ 11:48)  POCT Blood Glucose.: 173 mg/dL (01-13-25 @ 07:40)  POCT Blood Glucose.: 197 mg/dL (01-12-25 @ 21:10)  POCT Blood Glucose.: 160 mg/dL (01-12-25 @ 16:40)  POCT Blood Glucose.: 242 mg/dL (01-12-25 @ 12:04)  POCT Blood Glucose.: 144 mg/dL (01-11-25 @ 21:05)  POCT Blood Glucose.: 171 mg/dL (01-11-25 @ 16:40)  POCT Blood Glucose.: 158 mg/dL (01-11-25 @ 11:14)  POCT Blood Glucose.: 154 mg/dL (01-11-25 @ 07:31)  POCT Blood Glucose.: 145 mg/dL (01-10-25 @ 20:55)  POCT Blood Glucose.: 193 mg/dL (01-10-25 @ 16:11)  POCT Blood Glucose.: 230 mg/dL (01-10-25 @ 12:44)                          12.4   9.78  )-----------( 325      ( 08 Jan 2025 07:08 )             37.3     01-08    143  |  105  |  16  ----------------------------<  148[H]  4.4   |  25  |  0.5[L]    Ca    8.6      08 Jan 2025 07:08  Mg     1.9     01-08    TPro  6.0  /  Alb  3.3[L]  /  TBili  0.5  /  DBili  x   /  AST  21  /  ALT  19  /  AlkPhos  68  01-08     Patient is a 64y old  Female who presents with a chief complaint of Rehab of Multiple trauma/ right hip fracture (07 Jan 2025 13:07)      HPI:  Patient is a 65 yo female with PMHx of Afib on eliquis, HTN, DM, CHB wiht PPM, schizophrenia who presented to the ED on 01/02/25 after an unwitnessed fall with no head strike and no LOC. Patient was found on the ground outside of her apartment for an unknown period of time by her   who was there to  the patient for a PCP visit. Denied dizziness, tachycardia, diaphoresis prior to fall. Post- fall patient endorsed right hip pain, Of note, patient suffered a ground level fall 1 week prior 2/2 LE weakness however eloped prior to receiving medical tx. Imaging in ED showed:    CT HEAD:  No acute intracranial findings.  Stable mild-moderate chronic microvascular ischemic changes.    CT CERVICAL SPINE:  No acute cervical fracture or facet subluxation.    CT abdomen and pelvis w IV contrast:  Acute, displaced right femoral neck fracture.  Acute, nondisplaced right anterior 6-7th rib fracture; no pneumothorax.    XRAY tibia fibula:  No acute displaced fracture. Osteopenia.    Patient was evaluated by orthopedics and is s/p R hip hemiarthroplasty on 1/3 and WBAT RLE.     Prior to admission, the patient was independent in ADLs, had assistance from family and friends for iADLs, and ambulated without an assistive device. Patient now requires Vanessa for bed mobility, Vanessa for sit-stand transfer, ambulate 5' x2 Vanessa with RW, modA for bathing, CGA for UBD, maxA for LBD, CGA for toileting 2/2 to R hip hemiarthroplasty. Therefore, there is a significant functional decline from baseline. Patients also with medical comorbidities of Afib, HTN, DM requiring medication management and monitoring of vitals by Physiatry team and nursing. There are significant comorbidities which warrants acute rehab hospitalization. To return home it is in the patient’s best interest to have acute inpatient rehabilitative services to undergo intensive interdisciplinary therapy. Of note, the patient lives alone and would have difficulty performing ADLs and iADLs individually. Furthermore, the patient is motivated and is able to tolerate up to 3 hours of daily therapy for 5-6 days a week for a total of 15 hours a week. Evaluated by Physiatry and deemed to be an excellent candidate for admission to  for acute inpatient rehab. Admitted to Acute Rehab on 1/7/25.     LS: lives alone in sixth floor apt with elevator access and 1 MITCHELL  PLOF: Independent in ADLs. Assistance from family/friends for iADLs. Cane for ambulation.      (07 Jan 2025 13:07)      TODAY'S SUBJECTIVE & REVIEW OF SYMPTOMS:  Patient seen at bedside. Offers no complaints. No events overnight. Pain is well controlled. Pt intermittently refuses meds such as Eliquis and laxative/therapies however is becoming more compliant- patient repeatedly educated. Vitals reviewed. Constipated.    patient has schizoaffective disorder requiring injections of prolixin 37.5mg every 2 weeks, which she has not received for a 1-2 doses now. Pharmacy noted they are out of stock of prolixin and are hoping to a dose ready for the patient by today.     CLOF: partial A for BM and transfers Able to ambulate 60 ft x 2 w/ RW touchA. Not able to navigate steps. ModA for UBD, LBD, toileting      ROS:     Constitutional    [  x ] WNL           [   ] poor appetite   [   ] insomnia   [   ] tired   Cardio:                [ x  ] WNL           [   ] CP   [   ] WARD   [   ] palpitations               Resp:                   [ x  ] WNL           [   ] SOB   [   ] cough   [   ] wheezing   GI:                        [   ] WNL           [  x ] constipation   [   ] diarrhea   [   ] abdominal pain   [   ] nausea   [   ] emesis                                :                      [x   ] WNL           [   ] PATEL  [   ] dysuria   [   ] difficulty voiding             Endo:                   [x   ] WNL          [   ] po;yuria   [   ] temperature intolerance                 Skin:                     [ x  ] WNL          [   ] pain   [   ] wound   [   ] rash   MSK:                    [   ] WNL          [   ] muscle pain   [  x ] joint pain/ stiffness   [x   ] muscle tenderness right hip   [   ] swelling   Neuro:                 [x   ] WNL          [   ] HA   [   ] change in vision   [   ] tremor   [   ] weakness   [   ]dysphagia              Cognitive:           [ x  ] WNL           [   ]confusion      Psych:                  [ x  ] WNL           [   ] hallucinations   [   ]agitation   [   ] delusion   [   ]depression      PHYSICAL EXAM    Vital Signs Last 24 Hrs  T(C): 36.7 (15 Jasper 2025 04:47), Max: 36.8 (14 Jan 2025 19:51)  T(F): 98.1 (15 Jasper 2025 04:47), Max: 98.2 (14 Jan 2025 19:51)  HR: 64 (15 Jasper 2025 04:47) (64 - 98)  BP: 129/69 (15 Jasper 2025 04:47) (126/84 - 150/73)  BP(mean): 98 (14 Jan 2025 19:51) (98 - 98)  RR: 18 (15 Jasper 2025 04:47) (18 - 18)  SpO2: 97% (15 Jasper 2025 04:47) (96% - 97%)    Parameters below as of 15 Jasper 2025 04:47  Patient On (Oxygen Delivery Method): room air    General:[  x ] NAD, Resting Comfortable,   [   ] other:                                HEENT: [ x  ] NC/AT, EOMI, PERRL , Normal Conjunctivae,   [   ] other:  Cardio: [  x ] RRR, no murmer,   [   ] other:                              Pulm: [ x  ] No Respiratory Distress,  Lungs CTAB,   [   ] other:                       Abdomen: [ x  ]ND/NT, Soft,   [   ] other:    : [ x  ] NO PATEL CATHETER, [   ] PATEL CATHETER- no meatal tear, no discharge, [   ] other:                                            MSK: [   ] No joint swelling, Full ROM,   [x   ] other: R hip, limited ROM 2/2 pain                                    Ext: [ x  ]No C/C/E, No calf tenderness,   [   ]other:    Skin: [   ]intact,   [   x] other: R hip surgical site open to air, clean, no erythema , or discharge  few b/l LE abrasions/scabs                                                                  Neurological Examination:  Cognitive: [ x   ] AAO x 3,   [    ]  other:                                                                      Attention:  [ x   ] intact,   [    ]  other:                               Mood/Affect: [   ] wnl,    [  x  ]  other:   blunted, impaired initiation                                                                          Communication: [ x   ]Fluent, no dysarthria, following commands:  [  x  ] other: psychomotor slowing  CN II - XII:  [ x   ] intact,  [    ] other:                                                                                        Motor:   RIGHT UE: [  x ] WNL,  [   ] other:  LEFT    UE: [  x ] WNL,  [   ] other:  RIGHT LE: [   ] WNL,  [ x  ] other:  3-/5 except distally PF/DF 4+/5  LEFT    LE: [x   ] WNL,  [   ] other:    Tone: [  x  ] wnl,   [    ]  other:  DTRs: [  x ]symmetric, [   ] other:                                                                           Sensory: [   x ] Intact to light touch,   [    ] other:      MEDICATIONS  (STANDING):  apixaban 5 milliGRAM(s) Oral two times a day  atorvastatin 80 milliGRAM(s) Oral at bedtime  calcium carbonate   1250 mG (OsCal) 1 Tablet(s) Oral two times a day  carvedilol 3.125 milliGRAM(s) Oral every 12 hours  chlorhexidine 2% Cloths 1 Application(s) Topical <User Schedule>  dextrose 5%. 1000 milliLiter(s) (100 mL/Hr) IV Continuous <Continuous>  dextrose 5%. 1000 milliLiter(s) (50 mL/Hr) IV Continuous <Continuous>  dextrose 50% Injectable 25 Gram(s) IV Push once  dextrose 50% Injectable 12.5 Gram(s) IV Push once  dextrose 50% Injectable 25 Gram(s) IV Push once  ergocalciferol 38821 Unit(s) Oral every week  ezetimibe 10 milliGRAM(s) Oral daily  glucagon  Injectable 1 milliGRAM(s) IntraMuscular once  melatonin 10 milliGRAM(s) Oral at bedtime  polyethylene glycol 3350 17 Gram(s) Oral two times a day  senna 2 Tablet(s) Oral at bedtime    MEDICATIONS  (PRN):  acetaminophen     Tablet .. 650 milliGRAM(s) Oral every 6 hours PRN Mild Pain (1 - 3)  dextrose Oral Gel 15 Gram(s) Oral once PRN Blood Glucose LESS THAN 70 milliGRAM(s)/deciliter  furosemide    Tablet 40 milliGRAM(s) Oral daily PRN fluid overload  oxyCODONE    IR 5 milliGRAM(s) Oral every 6 hours PRN Moderate Pain (4 - 6)        RECENT LABS/IMAGING    CAPILLARY BLOOD GLUCOSE      POCT Blood Glucose.: 266 mg/dL (14 Jan 2025 21:30)  POCT Blood Glucose.: 134 mg/dL (14 Jan 2025 16:28)    POCT Blood Glucose.: 223 mg/dL (01-13-25 @ 11:48)  POCT Blood Glucose.: 173 mg/dL (01-13-25 @ 07:40)  POCT Blood Glucose.: 197 mg/dL (01-12-25 @ 21:10)  POCT Blood Glucose.: 160 mg/dL (01-12-25 @ 16:40)  POCT Blood Glucose.: 242 mg/dL (01-12-25 @ 12:04)  POCT Blood Glucose.: 144 mg/dL (01-11-25 @ 21:05)  POCT Blood Glucose.: 171 mg/dL (01-11-25 @ 16:40)  POCT Blood Glucose.: 158 mg/dL (01-11-25 @ 11:14)  POCT Blood Glucose.: 154 mg/dL (01-11-25 @ 07:31)  POCT Blood Glucose.: 145 mg/dL (01-10-25 @ 20:55)  POCT Blood Glucose.: 193 mg/dL (01-10-25 @ 16:11)  POCT Blood Glucose.: 230 mg/dL (01-10-25 @ 12:44)                          12.4   9.78  )-----------( 325      ( 08 Jan 2025 07:08 )             37.3     01-08    143  |  105  |  16  ----------------------------<  148[H]  4.4   |  25  |  0.5[L]    Ca    8.6      08 Jan 2025 07:08  Mg     1.9     01-08    TPro  6.0  /  Alb  3.3[L]  /  TBili  0.5  /  DBili  x   /  AST  21  /  ALT  19  /  AlkPhos  68  01-08

## 2025-01-16 ENCOUNTER — TRANSCRIPTION ENCOUNTER (OUTPATIENT)
Age: 65
End: 2025-01-16

## 2025-01-16 VITALS
TEMPERATURE: 98 F | RESPIRATION RATE: 18 BRPM | SYSTOLIC BLOOD PRESSURE: 114 MMHG | DIASTOLIC BLOOD PRESSURE: 64 MMHG | HEART RATE: 64 BPM | OXYGEN SATURATION: 98 %

## 2025-01-16 RX ORDER — SENNOSIDES 8.6 MG/1
2 TABLET, FILM COATED ORAL
Qty: 0 | Refills: 0 | DISCHARGE
Start: 2025-01-16

## 2025-01-16 RX ORDER — FUROSEMIDE 20 MG
1 TABLET ORAL
Qty: 0 | Refills: 0 | DISCHARGE
Start: 2025-01-16

## 2025-01-16 RX ORDER — ACETAMINOPHEN 80 MG/.8ML
2 SOLUTION/ DROPS ORAL
Qty: 0 | Refills: 0 | DISCHARGE
Start: 2025-01-16

## 2025-01-16 RX ORDER — CALCIUM CARBONATE 750 MG/1
1 TABLET, CHEWABLE ORAL
Qty: 0 | Refills: 0 | DISCHARGE
Start: 2025-01-16

## 2025-01-16 RX ORDER — CARVEDILOL 25 MG/1
1 TABLET, FILM COATED ORAL
Qty: 0 | Refills: 0 | DISCHARGE

## 2025-01-16 RX ORDER — CARVEDILOL 25 MG/1
1 TABLET, FILM COATED ORAL
Qty: 0 | Refills: 0 | DISCHARGE
Start: 2025-01-16

## 2025-01-16 RX ORDER — POLYETHYLENE GLYCOL 3350 17 G/DOSE
17 POWDER (GRAM) ORAL
Qty: 0 | Refills: 0 | DISCHARGE
Start: 2025-01-16

## 2025-01-16 NOTE — DISCHARGE NOTE PROVIDER - NSDCFUSCHEDAPPT_GEN_ALL_CORE_FT
Hans Byrnes  [facility] Physician [facility]  ELECTROPH [address]  Scheduled Appointment: 02/05/2025    [facility]  CARDIOLOGY [address]  Scheduled Appointment: 03/19/2025

## 2025-01-16 NOTE — DISCHARGE NOTE PROVIDER - HOSPITAL COURSE
Patient is a 63 yo female with PMHx of Afib on eliquis, HTN, DM, CHB wiht PPM, schizophrenia who presented to the ED on 01/02/25 after an unwitnessed fall with no head strike and no LOC. Patient was found on the ground outside of her apartment for an unknown period of time by her   who was there to  the patient for a PCP visit. Denied dizziness, tachycardia, diaphoresis prior to fall. Post- fall patient endorsed right hip pain, Of note, patient suffered a ground level fall 1 week prior 2/2 LE weakness however eloped prior to receiving medical tx. Imaging in ED showed an acute, displaced right femoral neck fracture. Patient was evaluated by orthopedics and is s/p R hip hemiarthroplasty on 1/3 and [facility] RLE. Prior to admission, the patient was independent in ADLs, had assistance from family and friends for iADLs, and ambulated without an assistive device. Patient now requires Vanessa for bed mobility, Vanessa for sit-stand transfer, ambulate 5' x2 Vanessa with RW, modA for bathing, CGA for UBD, maxA for LBD, CGA for toileting 2/2 to R hip hemiarthroplasty. Therefore, there is a significant functional decline from baseline. Patients also with medical comorbidities of Afib, HTN, DM requiring medication management and monitoring of vitals by Physiatry team and nursing. There are significant comorbidities which warrants acute rehab hospitalization. To return home it is in the patient’s best interest to have acute inpatient rehabilitative services to undergo intensive interdisciplinary therapy. Of note, the patient lives alone and would have difficulty performing ADLs and iADLs individually. Furthermore, the patient is motivated and is able to tolerate up to 3 hours of daily therapy for 5-6 days a week for a total of 15 hours a week. Evaluated by Physiatry and deemed to be an excellent candidate for admission to  for acute inpatient rehab. Admitted to Acute Rehab on 1/7/25.     During her course at the acute rehabilitation facility, patient had intensive rehabilitation therapy with PT and OT. On 1/9 at the PT room, patient became diaphoretic and had orthostatic hypotension. She was placed on Trendelenburg position and given 500cc LR bolus. Her device was interrogated by EP and found no device malfunction. Patient will need to be transferred to STR. Patient is already on therapeutic anticoagulation for DVT prophylaxis with eliquis 5mg BID. She will need follow up with orthopedic surgery due to her procedure, follow up with EP to interrogate device, and endocrinology for prediabetes (A1c- 6.1) and to rule out osteoporosis (found to have osteopenic changes on CT scans).    Patient is a 63 yo female with PMHx of Afib on eliquis, HTN, DM, CHB wiht PPM, schizophrenia who presented to the ED on 01/02/25 after an unwitnessed fall with no head strike and no LOC. Patient was found on the ground outside of her apartment for an unknown period of time by her   who was there to  the patient for a PCP visit. Denied dizziness, tachycardia, diaphoresis prior to fall. Post- fall patient endorsed right hip pain, Of note, patient suffered a ground level fall 1 week prior 2/2 LE weakness however eloped prior to receiving medical tx. Imaging in ED showed an acute, displaced right femoral neck fracture. Patient was evaluated by orthopedics and is s/p R hip hemiarthroplasty on 1/3 and [facility] RLE. Prior to admission, the patient was independent in ADLs, had assistance from family and friends for iADLs, and ambulated without an assistive device. Patient now requires Vanessa for bed mobility, Vanessa for sit-stand transfer, ambulate 5' x2 Vanessa with RW, modA for bathing, CGA for UBD, maxA for LBD, CGA for toileting 2/2 to R hip hemiarthroplasty. Therefore, there is a significant functional decline from baseline. Patients also with medical comorbidities of Afib, HTN, DM requiring medication management and monitoring of vitals by Physiatry team and nursing. There are significant comorbidities which warrants acute rehab hospitalization. To return home it is in the patient’s best interest to have acute inpatient rehabilitative services to undergo intensive interdisciplinary therapy. Of note, the patient lives alone and would have difficulty performing ADLs and iADLs individually. Furthermore, the patient is motivated and is able to tolerate up to 3 hours of daily therapy for 5-6 days a week for a total of 15 hours a week. Evaluated by Physiatry and deemed to be an excellent candidate for admission to  for acute inpatient rehab. Admitted to Acute Rehab on 1/7/25.     During her course at the acute rehabilitation facility, patient had intensive rehabilitation therapy with PT and OT. On 1/9 at the PT room, patient became diaphoretic and had orthostatic hypotension. She was placed on Trendelenburg position and given 500cc LR bolus. Her device was interrogated by EP and found no device malfunction. Patient will need to be transferred to STR. Patient is already on therapeutic anticoagulation for DVT prophylaxis with eliquis 5mg BID. She will need follow up with orthopedic surgery due to her procedure, follow up with EP to interrogate device, and endocrinology for prediabetes (A1c- 6.1) and to rule out osteoporosis (found to have osteopenic changes on CT scans).     Pt staples removed bedside 1/6/2025 Patient is a 65 yo female with PMHx of Afib on eliquis, HTN, DM, CHB wiht PPM, schizophrenia who presented to the ED on 01/02/25 after an unwitnessed fall with no head strike and no LOC. Patient was found on the ground outside of her apartment for an unknown period of time by her   who was there to  the patient for a PCP visit. Denied dizziness, tachycardia, diaphoresis prior to fall. Post- fall patient endorsed right hip pain, Of note, patient suffered a ground level fall 1 week prior 2/2 LE weakness however eloped prior to receiving medical tx. Imaging in ED showed an acute, displaced right femoral neck fracture. Patient was evaluated by orthopedics and is s/p R hip hemiarthroplasty on 1/3 and [facility] RLE. Prior to admission, the patient was independent in ADLs, had assistance from family and friends for iADLs, and ambulated without an assistive device. Patient now requires Vanessa for bed mobility, Vanessa for sit-stand transfer, ambulate 5' x2 Vanessa with RW, modA for bathing, CGA for UBD, maxA for LBD, CGA for toileting 2/2 to R hip hemiarthroplasty. Therefore, there is a significant functional decline from baseline. Patients also with medical comorbidities of Afib, HTN, DM requiring medication management and monitoring of vitals by Physiatry team and nursing. There are significant comorbidities which warrants acute rehab hospitalization. To return home it is in the patient’s best interest to have acute inpatient rehabilitative services to undergo intensive interdisciplinary therapy. Of note, the patient lives alone and would have difficulty performing ADLs and iADLs individually. Furthermore, the patient is motivated and is able to tolerate up to 3 hours of daily therapy for 5-6 days a week for a total of 15 hours a week. Evaluated by Physiatry and deemed to be an excellent candidate for admission to  for acute inpatient rehab. Admitted to Acute Rehab on 1/7/25.     During her course at the acute rehabilitation facility, patient had intensive rehabilitation therapy with PT and OT. On 1/9 at the PT room, patient became diaphoretic and had orthostatic hypotension. She was placed on Trendelenburg position and given 500cc LR bolus. Her device was interrogated by EP and found no device malfunction. Patient will need to be transferred to STR. Patient is already on therapeutic anticoagulation for DVT prophylaxis with eliquis 5mg BID. She will need follow up with orthopedic surgery due to her procedure, follow up with EP to interrogate device, and endocrinology for prediabetes (A1c- 6.1) and to rule out osteoporosis (found to have osteopenic changes on CT scans).     Pt staples removed bedside 1/16/2025  #Rehab of right femoral neck fracture 2/2 mechanical fall s/p R hip hemiarthroplasty with  gait abnormality and decline in function  #Right anterior 6-7th rib fx   #Multiple BLE abrasions  - CT Chest: Acute, displaced right femoral neck fracture; Acute, nondisplaced right anterior 6-7th rib fracture; no pneumothorax.  - WBAT RLE  -Pain control with tylenol prn and oxycodone 5mg prn  Appropriate for d/c to STR today. Patient in agreement. Slow gains and unable to go back to apartment at this time.     #Vitamin D deficiency   - continue with calcium and vitamin D supplementation     #Paroxysmal Afib on Eliquis  #CHB with PPM  -patient has been refusing eliquis despite understanding risks  -patient is pacemaker dependent   -evaluated by electrophysiology on admission: follow up outpatient  - continue with home med Eliquis   - rate controlled  - had brief unresponsive episode in chair on 1/9. EPS interrogated the PPM, noted no events. .   possible seizure or orthostatic episode . Monitor. None since.    #HTN  -continue with coreg 3.125mg bid   -monitor BP and adjust medication as needed    prediabetes  -A1C 6.1  -patient understand the benefits of glycemic control and the risks of hyperglycemia and uncontrolled diabates however at this point in time she declines treatment for DM and hyperglycemia, does not want insulin.     #HLD  -continue with lipitor 80mg qhs  -continue with ezetimibe 10mg daily     #schizoaffective disorder   - no evidence of active psychosis  - cooperative with therapy, though not always compliant with taking meds.  - on PTA prolixin 37.5mg every 2 weeks, last dose was 12/20/24   -will give injection today pending supply from pharmacy, next dose due 1/29/25   - Psych consult prn at STR    #tobacco dependence   -1PPD for past 40 years  - declining nicotine patch at this time   - offer counseling     -Pain control: prn tylenol and prn oxycodone     -GI/Bowel Mgmt: miralax bid      -Skin:  monitor incision      - Diet: DASH/ CC    Precautions / PROPHYLAXIS:      - Falls    - Ortho: Weight bearing status: WBAT RLE      - DVT prophylaxis: Eliquis

## 2025-01-16 NOTE — DISCHARGE NOTE PROVIDER - NSDCCPCAREPLAN_GEN_ALL_CORE_FT
PRINCIPAL DISCHARGE DIAGNOSIS  Diagnosis: Closed fracture of right hip  Assessment and Plan of Treatment: You presented after a mechanical fall, sustaining a displaced right femoral neck fracture (treated with hemiarthroplasty) and a non-displaced right anterior 6-7th rib fracture. Your hip fracture was repaired by our orthopedic surgeons. You are already on blood clot prevention with eliquis  You will require outpatient follow-up with endocrinology to evaluate for osteoporosis given the nature of your fracture.  You will be discharged on calcium and vitamin D supplementation, pain medication, and will follow up with orthopedics, endocrinology, electrophysiology, and PCP. You are stable for discharge from intensive rehabilitation therapy      SECONDARY DISCHARGE DIAGNOSES  Diagnosis: Rib fracture  Assessment and Plan of Treatment:

## 2025-01-16 NOTE — PROGRESS NOTE ADULT - SUBJECTIVE AND OBJECTIVE BOX
Patient is a 64y old  Female who presents with a chief complaint of Rehab of Multiple trauma/ right hip fracture (07 Jan 2025 13:07)      HPI:  Patient is a 63 yo female with PMHx of Afib on eliquis, HTN, DM, CHB wiht PPM, schizophrenia who presented to the ED on 01/02/25 after an unwitnessed fall with no head strike and no LOC. Patient was found on the ground outside of her apartment for an unknown period of time by her   who was there to  the patient for a PCP visit. Denied dizziness, tachycardia, diaphoresis prior to fall. Post- fall patient endorsed right hip pain, Of note, patient suffered a ground level fall 1 week prior 2/2 LE weakness however eloped prior to receiving medical tx. Imaging in ED showed:    CT HEAD:  No acute intracranial findings.  Stable mild-moderate chronic microvascular ischemic changes.    CT CERVICAL SPINE:  No acute cervical fracture or facet subluxation.    CT abdomen and pelvis w IV contrast:  Acute, displaced right femoral neck fracture.  Acute, nondisplaced right anterior 6-7th rib fracture; no pneumothorax.    XRAY tibia fibula:  No acute displaced fracture. Osteopenia.    Patient was evaluated by orthopedics and is s/p R hip hemiarthroplasty on 1/3 and WBAT RLE.     Prior to admission, the patient was independent in ADLs, had assistance from family and friends for iADLs, and ambulated without an assistive device. Patient now requires Vanessa for bed mobility, Vanessa for sit-stand transfer, ambulate 5' x2 Vanessa with RW, modA for bathing, CGA for UBD, maxA for LBD, CGA for toileting 2/2 to R hip hemiarthroplasty. Therefore, there is a significant functional decline from baseline. Patients also with medical comorbidities of Afib, HTN, DM requiring medication management and monitoring of vitals by Physiatry team and nursing. There are significant comorbidities which warrants acute rehab hospitalization. To return home it is in the patient’s best interest to have acute inpatient rehabilitative services to undergo intensive interdisciplinary therapy. Of note, the patient lives alone and would have difficulty performing ADLs and iADLs individually. Furthermore, the patient is motivated and is able to tolerate up to 3 hours of daily therapy for 5-6 days a week for a total of 15 hours a week. Evaluated by Physiatry and deemed to be an excellent candidate for admission to  for acute inpatient rehab. Admitted to Acute Rehab on 1/7/25.     LS: lives alone in sixth floor apt with elevator access and 1 [address]  PLOF: Independent in ADLs. Assistance from family/friends for iADLs. Cane for ambulation.      (07 Jan 2025 13:07)      TODAY'S SUBJECTIVE & REVIEW OF SYMPTOMS:  Patient seen at bedside. Offers no complaints. No events overnight. Pain is well controlled.   Patient has schizoaffective disorder requiring injections of prolixin 37.5mg every 2 weeks, which she has not received for a 1-2 doses now. Ordered for today prior to d/c to STR.    CLOF: Transfers with SA. Able to ambulate 150 ft RW SA. Not able to navigate steps. ModA for LBD     ROS:     Constitutional    [  x ] WNL           [   ] poor appetite   [   ] insomnia   [   ] tired   Cardio:                [ x  ] WNL           [   ] CP   [   ] WARD   [   ] palpitations               Resp:                   [ x  ] WNL           [   ] SOB   [   ] cough   [   ] wheezing   GI:                        [   ] WNL           [  x ] constipation   [   ] diarrhea   [   ] abdominal pain   [   ] nausea   [   ] emesis                                :                      [x   ] WNL           [   ] PATEL  [   ] dysuria   [   ] difficulty voiding             Endo:                   [x   ] WNL          [   ] polyuria   [   ] temperature intolerance                 Skin:                     [ x  ] WNL          [   ] pain   [   ] wound   [   ] rash   MSK:                    [   ] WNL          [   ] muscle pain   [  x ] joint pain/ stiffness   [x   ] muscle tenderness right hip   [   ] swelling   Neuro:                 [x   ] WNL          [   ] HA   [   ] change in vision   [   ] tremor   [   ] weakness   [   ]dysphagia              Cognitive:           [ x  ] WNL           [   ]confusion      Psych:                  [   ] WNL           [   ] hallucinations   [   ]agitation   [   ] delusion   [   ]depression  [ x] schizoaffective disorder      PHYSICAL EXAM    Vital Signs Last 24 Hrs  T(C): 36.7 (16 Jan 2025 12:44), Max: 36.7 (15 Jasper 2025 19:56)  T(F): 98 (16 Jan 2025 12:44), Max: 98 (15 Jasper 2025 19:56)  HR: 64 (16 Jan 2025 12:44) (60 - 64)  BP: 114/64 (16 Jan 2025 12:44) (114/57 - 143/75)  BP(mean): 98 (15 Jasper 2025 19:56) (98 - 98)  RR: 18 (16 Jan 2025 12:44) (18 - 18)  SpO2: 98% (16 Jan 2025 12:44) (95% - 98%)    Parameters below as of 16 Jan 2025 12:44  Patient On (Oxygen Delivery Method): room air        General:[  x ] NAD, Resting Comfortable,   [   ] other:                                HEENT: [ x  ] NC/AT, EOMI, PERRL , Normal Conjunctivae,   [   ] other:  Cardio: [  x ] RRR, no murmer,   [   ] other:                              Pulm: [ x  ] No Respiratory Distress,  Lungs CTAB,   [   ] other:                       Abdomen: [ x  ]ND/NT, Soft,   [   ] other:    : [ x  ] NO PATEL CATHETER, [   ] PATEL CATHETER- no meatal tear, no discharge, [   ] other:                                            MSK: [   ] No joint swelling, Full ROM,   [x   ] other: R hip, limited ROM 2/2 pain                                    Ext: [ x  ]No C/C/E, No calf tenderness,   [   ]other:    Skin: [   ]intact,   [   x] other: R hip surgical site clean and dry, healing well. Staples removed today.                                                               Neurological Examination: Psychomotor slowing  Cognitive: [ x   ] AAO x 3,   [    ]  other:                                                                      Attention:  [ x   ] intact,   [    ]  other:                               Mood/Affect: [   ] wnl,    [  x  ]  other:   blunted, impaired initiation                                                                          Communication: [ x   ]Fluent, no dysarthria, following commands:  [  x  ] other: psychomotor slowing  CN II - XII:  [ x   ] intact,  [    ] other:                                                                                        Motor:   RIGHT UE: [  x ] WNL,  [   ] other:  LEFT    UE: [  x ] WNL,  [   ] other:  RIGHT LE: [   ] WNL,  [ x  ] other:  3-/5 except distally PF/DF 4+/5  LEFT    LE: [x   ] WNL,  [   ] other:    Tone: [  x  ] wnl,   [    ]  other:  DTRs: [  x ]symmetric, [   ] other:                                                                           Sensory: [   x ] Intact to light touch,   [    ] other:      MEDICATIONS  (STANDING):  apixaban 5 milliGRAM(s) Oral two times a day  atorvastatin 80 milliGRAM(s) Oral at bedtime  calcium carbonate   1250 mG (OsCal) 1 Tablet(s) Oral two times a day  carvedilol 3.125 milliGRAM(s) Oral every 12 hours  chlorhexidine 2% Cloths 1 Application(s) Topical <User Schedule>  dextrose 5%. 1000 milliLiter(s) (100 mL/Hr) IV Continuous <Continuous>  dextrose 5%. 1000 milliLiter(s) (50 mL/Hr) IV Continuous <Continuous>  dextrose 50% Injectable 25 Gram(s) IV Push once  dextrose 50% Injectable 12.5 Gram(s) IV Push once  dextrose 50% Injectable 25 Gram(s) IV Push once  ergocalciferol 97711 Unit(s) Oral every week  ezetimibe 10 milliGRAM(s) Oral daily  glucagon  Injectable 1 milliGRAM(s) IntraMuscular once  melatonin 10 milliGRAM(s) Oral at bedtime  polyethylene glycol 3350 17 Gram(s) Oral two times a day  senna 2 Tablet(s) Oral at bedtime      RECENT LABS/IMAGING    CAPILLARY BLOOD GLUCOSE      POCT Blood Glucose.: 266 mg/dL (14 Jan 2025 21:30)  POCT Blood Glucose.: 134 mg/dL (14 Jan 2025 16:28)    POCT Blood Glucose.: 223 mg/dL (01-13-25 @ 11:48)  POCT Blood Glucose.: 173 mg/dL (01-13-25 @ 07:40)  POCT Blood Glucose.: 197 mg/dL (01-12-25 @ 21:10)  POCT Blood Glucose.: 160 mg/dL (01-12-25 @ 16:40)  POCT Blood Glucose.: 242 mg/dL (01-12-25 @ 12:04)  POCT Blood Glucose.: 144 mg/dL (01-11-25 @ 21:05)  POCT Blood Glucose.: 171 mg/dL (01-11-25 @ 16:40)  POCT Blood Glucose.: 158 mg/dL (01-11-25 @ 11:14)  POCT Blood Glucose.: 154 mg/dL (01-11-25 @ 07:31)  POCT Blood Glucose.: 145 mg/dL (01-10-25 @ 20:55)  POCT Blood Glucose.: 193 mg/dL (01-10-25 @ 16:11)  POCT Blood Glucose.: 230 mg/dL (01-10-25 @ 12:44)                          12.4   9.78  )-----------( 325      ( 08 Jan 2025 07:08 )             37.3     01-08    143  |  105  |  16  ----------------------------<  148[H]  4.4   |  25  |  0.5[L]    Ca    8.6      08 Jan 2025 07:08  Mg     1.9     01-08    TPro  6.0  /  Alb  3.3[L]  /  TBili  0.5  /  DBili  x   /  AST  21  /  ALT  19  /  AlkPhos  68  01-08

## 2025-01-16 NOTE — PROGRESS NOTE ADULT - REASON FOR ADMISSION
Rehab of Multiple trauma/ right hip fracture

## 2025-01-16 NOTE — DISCHARGE NOTE PROVIDER - CARE PROVIDER_API CALL
Hans Byrnes  Cardiac Electrophysiology  [address] [place] [address]  Phone: [phone]  Fax: [phone]  Scheduled Appointment: 02/05/2025    Chaparro Meng  Orthopaedic Surgery  [address] [place] [address]  Phone: [phone]  Fax: [phone]  Follow Up Time: 2 weeks    Luis Joyner  Internal Medicine  [address] [place] [address]  Phone: [phone]  Fax: [phone]  Follow Up Time: 2 weeks    Cici Lawrence  Endocrinology/Metab/Diabetes  [address] [place] [address]  Phone: [phone]  Fax: [phone]  Follow Up Time: 1 month

## 2025-01-16 NOTE — DISCHARGE NOTE NURSING/CASE MANAGEMENT/SOCIAL WORK - NSDCPEEMAIL_GEN_ALL_CORE
[facility] email [email or web address]

## 2025-01-16 NOTE — DISCHARGE NOTE NURSING/CASE MANAGEMENT/SOCIAL WORK - FINANCIAL ASSISTANCE
Health system provides services at a reduced cost to those who are determined to be eligible through Health system’s financial assistance program. Information regarding Health system’s financial assistance program can be found by going to [email or web address] or by calling [phone].

## 2025-01-16 NOTE — DISCHARGE NOTE NURSING/CASE MANAGEMENT/SOCIAL WORK - NSDCPEWEB_GEN_ALL_CORE
[facility] for Tobacco Control website --- [email or web address] website --- [email or web address]

## 2025-01-16 NOTE — DISCHARGE NOTE PROVIDER - PROVIDER TOKENS
PROVIDER:[TOKEN:[[id number]],SCHEDULEDAPPT:[02/05/2025]],PROVIDER:[TOKEN:[[id number]],FOLLOWUP:[2 weeks]],PROVIDER:[TOKEN:[[id number]],FOLLOWUP:[2 weeks]],PROVIDER:[TOKEN:[[id number]],FOLLOWUP:[1 month]]

## 2025-01-16 NOTE — PROGRESS NOTE ADULT - ASSESSMENT
65 yo female with PMHx of Afib on eliquis, HTN, DM, CHB wiht PPM, schizophrenia who presented to the ED on 01/02/25 after an unwitnessed fall with right femoral neck fracture on imaging s/p R hip hemiarthroplasty on 1/3. Admitted to rehab for mulittrauama s/p R hip hemiarthroplasty     #Rehab of right femoral neck fracture 2/2 mechanical fall s/p R hip hemiarthroplasty with  gait abnormality and decline in function  #Right anterior 6-7th rib fx   #Multiple BLE abrasions  - CT Chest: Acute, displaced right femoral neck fracture; Acute, nondisplaced right anterior 6-7th rib fracture; no pneumothorax.  - WBAT RLE  -Pain control with tylenol prn and oxycodone 5mg prn  Appropriate for d/c to [facility] today. Patient in agreement. Slow gains and unable to go back to apartment at this time.     #Vitamin D deficiency   - continue with calcium and vitamin D supplementation     #Paroxysmal Afib on Eliquis  #CHB with PPM  -patient has been refusing eliquis despite understanding risks  -patient is pacemaker dependent   -evaluated by electrophysiology on admission: follow up outpatient  - continue with home med Eliquis   - rate controlled  - had brief unresponsive episode in chair on 1/9. EPS interrogated the PPM, noted no events. .   possible seizure or orthostatic episode . Monitor. None since.    #HTN  -continue with coreg 3.125mg bid   -monitor BP and adjust medication as needed    prediabetes  -A1C 6.1  -patient understand the benefits of glycemic control and the risks of hyperglycemia and uncontrolled diabates however at this point in time she declines treatment for DM and hyperglycemia, does not want insulin.     #HLD  -continue with lipitor 80mg qhs  -continue with ezetimibe 10mg daily     #schizoaffective disorder   - no evidence of active psychosis  - cooperative with therapy, though not always compliant with taking meds.  - on PTA prolixin 37.5mg every 2 weeks, last dose was 12/20/24   -will give injection today pending supply from pharmacy, next dose due 1/29/25   - Psych consult prn at STR    #tobacco dependence   -1PPD for past 40 years  - declining nicotine patch at this time   - offer counseling     -Pain control: prn tylenol and prn oxycodone     -GI/Bowel Mgmt: miralax bid      -Skin:  monitor incision      - Diet: DASH/ CC    Precautions / PROPHYLAXIS:      - Falls    - Ortho: Weight bearing status: WBAT RLE      - DVT prophylaxis: Eliquis    For further summary of hospital course and d/c med rec and patient instructions, see Provider D/C Note, reviewed and signed by me.

## 2025-01-16 NOTE — DISCHARGE NOTE PROVIDER - CARE PROVIDERS DIRECT ADDRESSES
[email or web address]

## 2025-01-16 NOTE — DISCHARGE NOTE PROVIDER - NSDCMRMEDTOKEN_GEN_ALL_CORE_FT
acetaminophen 325 mg oral tablet: 2 tab(s) orally every 6 hours As needed Mild Pain (1 - 3)  atorvastatin 80 mg oral tablet: 1 tab(s) orally once a day (at bedtime)  calcium carbonate 1250 mg (500 mg elemental calcium) oral tablet: 1 tab(s) orally 2 times a day  carvedilol 3.125 mg oral tablet: 1 tab(s) orally every 12 hours  Eliquis 5 mg oral tablet: 1 tab(s) orally 2 times a day  ergocalciferol 1.25 mg (50,000 intl units) oral capsule: 1 cap(s) orally once a week  ezetimibe 10 mg oral tablet: 1 tab(s) orally once a day  furosemide 40 mg oral tablet: 1 tab(s) orally once a day As needed fluid overload  melatonin 10 mg oral capsule: 1 cap(s) orally once a day (at bedtime)  polyethylene glycol 3350 oral powder for reconstitution: 17 gram(s) orally 2 times a day  Prolixin Decanoate 25 mg/mL injectable solution: 125 milligram(s) intramuscularly every 2 weeks  senna leaf extract oral tablet: 2 tab(s) orally once a day (at bedtime)

## 2025-01-16 NOTE — DISCHARGE NOTE NURSING/CASE MANAGEMENT/SOCIAL WORK - PATIENT PORTAL LINK FT
You can access the FollowMyHealth Patient Portal offered by [facility] by registering at the following website: [email or web address]. By joining [facility]’s FollowMyHealth portal, you will also be able to view your health information using other applications (apps) compatible with our system.

## 2025-01-16 NOTE — PROGRESS NOTE ADULT - PROVIDER SPECIALTY LIST ADULT
Rehab Medicine

## 2025-01-17 DIAGNOSIS — Z47.1 AFTERCARE FOLLOWING JOINT REPLACEMENT SURGERY: ICD-10-CM

## 2025-01-17 DIAGNOSIS — E78.5 HYPERLIPIDEMIA, UNSPECIFIED: ICD-10-CM

## 2025-01-17 DIAGNOSIS — E11.9 TYPE 2 DIABETES MELLITUS WITHOUT COMPLICATIONS: ICD-10-CM

## 2025-01-17 DIAGNOSIS — Z96.641 PRESENCE OF RIGHT ARTIFICIAL HIP JOINT: ICD-10-CM

## 2025-01-17 DIAGNOSIS — F25.0 SCHIZOAFFECTIVE DISORDER, BIPOLAR TYPE: ICD-10-CM

## 2025-01-17 DIAGNOSIS — R26.89 OTHER ABNORMALITIES OF GAIT AND MOBILITY: ICD-10-CM

## 2025-01-17 DIAGNOSIS — I48.0 PAROXYSMAL ATRIAL FIBRILLATION: ICD-10-CM

## 2025-01-17 DIAGNOSIS — S80.211D ABRASION, RIGHT KNEE, SUBSEQUENT ENCOUNTER: ICD-10-CM

## 2025-01-17 DIAGNOSIS — F17.210 NICOTINE DEPENDENCE, CIGARETTES, UNCOMPLICATED: ICD-10-CM

## 2025-01-17 DIAGNOSIS — S22.41XD MULTIPLE FRACTURES OF RIBS, RIGHT SIDE, SUBSEQUENT ENCOUNTER FOR FRACTURE WITH ROUTINE HEALING: ICD-10-CM

## 2025-01-17 DIAGNOSIS — Z79.01 LONG TERM (CURRENT) USE OF ANTICOAGULANTS: ICD-10-CM

## 2025-01-17 DIAGNOSIS — I10 ESSENTIAL (PRIMARY) HYPERTENSION: ICD-10-CM

## 2025-01-17 DIAGNOSIS — Z95.0 PRESENCE OF CARDIAC PACEMAKER: ICD-10-CM

## 2025-01-17 DIAGNOSIS — E55.9 VITAMIN D DEFICIENCY, UNSPECIFIED: ICD-10-CM

## 2025-01-23 ENCOUNTER — APPOINTMENT (OUTPATIENT)
Dept: ORTHOPEDIC SURGERY | Facility: ASSISTED LIVING FACILITY | Age: 65
End: 2025-01-23
Payer: MEDICARE

## 2025-01-23 PROCEDURE — 99024 POSTOP FOLLOW-UP VISIT: CPT

## 2025-02-05 ENCOUNTER — APPOINTMENT (OUTPATIENT)
Dept: ELECTROPHYSIOLOGY | Facility: CLINIC | Age: 65
End: 2025-02-05

## 2025-03-19 ENCOUNTER — APPOINTMENT (OUTPATIENT)
Dept: CARDIOLOGY | Facility: CLINIC | Age: 65
End: 2025-03-19
Payer: MEDICARE

## 2025-03-19 ENCOUNTER — NON-APPOINTMENT (OUTPATIENT)
Age: 65
End: 2025-03-19

## 2025-03-19 PROCEDURE — 93296 REM INTERROG EVL PM/IDS: CPT

## 2025-03-19 PROCEDURE — 93294 REM INTERROG EVL PM/LDLS PM: CPT

## 2025-05-07 ENCOUNTER — APPOINTMENT (OUTPATIENT)
Dept: ELECTROPHYSIOLOGY | Facility: CLINIC | Age: 65
End: 2025-05-07

## 2025-07-22 NOTE — OCCUPATIONAL THERAPY INITIAL EVALUATION ADULT - PRECAUTIONS/LIMITATIONS, REHAB EVAL
"Endoscopy Scheduling Screen    Caller: patient    Have you had any respiratory illness or flu-like symptoms in the last 10 days?  No    Patient is ACTIVE on [employer].  Inform patient that all appointment instructions will be sent via [employer].    Review patient's insurance for any non participating payor.    Ordering/Referring Provider: Humberto   (If ordering provider performs procedure, schedule with ordering provider unless otherwise instructed. )    BMI: Estimated body mass index is 30.93 kg/m  as calculated from the following:    Height as of 6/28/25: 1.753 m (5' 9\").    Weight as of 6/28/25: 95 kg (209 lb 7 oz).     Sedation Ordered  moderate sedation.   If patient BMI > 50 do not schedule in [facility].    If patient BMI > 45 do not schedule at [facility].    Are you taking methadone or Suboxone?  NO, No RN review required.    Have you been diagnosed and are being treated for severe PTSD or severe anxiety?  YES, Schedule with [facility]. (If patient has additional questions please InBasket to RN pool for .)    Are you taking any prescription medications for pain 3 or more times per week?   NO, No RN review required.    Do you have a history of malignant hyperthermia?  No    (Females) Are you currently pregnant?        Have you been diagnosed or told you have pulmonary hypertension?   No    Do you have an LVAD?  No    Have you been told you have moderate to severe sleep apnea?  Yes. Do you use a CPAP? No. (RN Review required for scheduling unless scheduling in Hospital.)   Patient should be using CPAP, but does not   Have you been told you have COPD, asthma, or any other lung disease?  Yes     What breathing problems do you have?  Asthma     Do you use home oxygen?  No    Have your breathing problems required an ED visit or hospitalization in the last year?  No.    Has your doctor ordered any cardiac tests like echo, angiogram, stress test, ablation, or EKG, that you have not completed yet?  No    Do you  have a history of any " "heart conditions?  No     Have you ever had or are you waiting for an organ transplant?  No. Continue scheduling, no site restrictions.    Have you had a stroke or transient ischemic attack (TIA aka \"mini stroke\") in the last 2 years?   No.    Have you been diagnosed with or been told you have cirrhosis of the liver?   No.    Are you currently on dialysis?   No    Do you need assistance transferring?   No    BMI: Estimated body mass index is 30.93 kg/m  as calculated from the following:    Height as of 6/28/25: 1.753 m (5' 9\").    Weight as of 6/28/25: 95 kg (209 lb 7 oz).     Is patients BMI > 40 and scheduling location UP?  No    Do you take an injectable or oral medication for weight loss or diabetes (excluding insulin)?  No    Do you take the medication Naltrexone?  No    Do you take blood thinners?  Yes, you must contact your prescribing provider for directions on holding or bridging with a different medication. Eliquis      Prep   Are you currently on dialysis or do you have chronic kidney disease?  No    Do you have a diagnosis of diabetes?  No    Do you have a diagnosis of cystic fibrosis (CF)?  No    On a regular basis do you go 3 -5 days between bowel movements?  No    BMI > 40?  No    Preferred Pharmacy:    [facility] #[id number] [address] [facility] [address]  Phone: [phone] Fax: [phone]    Final Scheduling Details     Procedure scheduled  Colonoscopy / Upper endoscopy (EGD)    Surgeon:  Humberto     Date of procedure:  9/29/25     Pre-OP / PAC:   Yes - Patient informed of pre-op requirement.    Location  [facility] - Per order.    Sedation   [facility]/Deep Sedation Per Dr Paul's office when they called to schedule.       Patient Reminders:   You will receive a call from a Nurse to review instructions and health history.  This assessment must be completed prior to your procedure.  Failure to complete the Nurse " assessment may result in the procedure being cancelled.      On the day of your procedure, please designate an adult(s) who can drive you home stay with you for the next 24 hours. The medicines used in the exam will make you sleepy. You will not be able to drive.      You cannot take public transportation, ride share services, or non-medical taxi service without a responsible caregiver.  Medical transport services are allowed with the requirement that a responsible caregiver will receive you at your destination.  We require that drivers and caregivers are confirmed prior to your procedure.    fall precautions

## 2025-08-06 ENCOUNTER — APPOINTMENT (OUTPATIENT)
Dept: CARDIOLOGY | Facility: CLINIC | Age: 65
End: 2025-08-06

## 2025-09-19 ENCOUNTER — TRANSCRIPTION ENCOUNTER (OUTPATIENT)
Age: 65
End: 2025-09-19